# Patient Record
Sex: MALE | Race: WHITE | NOT HISPANIC OR LATINO | Employment: OTHER | URBAN - NONMETROPOLITAN AREA
[De-identification: names, ages, dates, MRNs, and addresses within clinical notes are randomized per-mention and may not be internally consistent; named-entity substitution may affect disease eponyms.]

---

## 2017-01-17 ENCOUNTER — AMBULATORY - GICH (OUTPATIENT)
Dept: SCHEDULING | Facility: OTHER | Age: 72
End: 2017-01-17

## 2017-01-26 ENCOUNTER — AMBULATORY - GICH (OUTPATIENT)
Dept: LAB | Facility: OTHER | Age: 72
End: 2017-01-26

## 2017-01-26 ENCOUNTER — OFFICE VISIT - GICH (OUTPATIENT)
Dept: UROLOGY | Facility: OTHER | Age: 72
End: 2017-01-26

## 2017-01-26 ENCOUNTER — HISTORY (OUTPATIENT)
Dept: UROLOGY | Facility: OTHER | Age: 72
End: 2017-01-26

## 2017-01-26 DIAGNOSIS — C61 MALIGNANT NEOPLASM OF PROSTATE (H): ICD-10-CM

## 2017-01-26 DIAGNOSIS — N52.9 MALE ERECTILE DYSFUNCTION: ICD-10-CM

## 2017-01-26 DIAGNOSIS — N40.1 ENLARGED PROSTATE WITH LOWER URINARY TRACT SYMPTOMS (LUTS): ICD-10-CM

## 2017-01-26 LAB — PSA TOTAL (DIAGNOSTIC) - HISTORICAL: 3.35 NG/ML

## 2017-02-16 ENCOUNTER — HISTORIC RESULTS (OUTPATIENT)
Dept: ADMINISTRATIVE | Age: 72
End: 2017-02-16

## 2017-04-03 ENCOUNTER — COMMUNICATION - GICH (OUTPATIENT)
Dept: FAMILY MEDICINE | Facility: OTHER | Age: 72
End: 2017-04-03

## 2017-04-03 DIAGNOSIS — I25.83 CORONARY ATHEROSCLEROSIS DUE TO LIPID RICH PLAQUE (CODE): ICD-10-CM

## 2017-04-03 DIAGNOSIS — I25.10 ATHEROSCLEROTIC HEART DISEASE OF NATIVE CORONARY ARTERY WITHOUT ANGINA PECTORIS: ICD-10-CM

## 2017-04-03 DIAGNOSIS — Z86.79 PERSONAL HISTORY OF OTHER DISEASES OF THE CIRCULATORY SYSTEM (CODE): ICD-10-CM

## 2017-05-01 ENCOUNTER — AMBULATORY - GICH (OUTPATIENT)
Dept: SCHEDULING | Facility: OTHER | Age: 72
End: 2017-05-01

## 2017-05-09 ENCOUNTER — HISTORY (OUTPATIENT)
Dept: FAMILY MEDICINE | Facility: OTHER | Age: 72
End: 2017-05-09

## 2017-05-09 ENCOUNTER — OFFICE VISIT - GICH (OUTPATIENT)
Dept: FAMILY MEDICINE | Facility: OTHER | Age: 72
End: 2017-05-09

## 2017-05-09 DIAGNOSIS — I25.10 ATHEROSCLEROTIC HEART DISEASE OF NATIVE CORONARY ARTERY WITHOUT ANGINA PECTORIS: ICD-10-CM

## 2017-05-09 DIAGNOSIS — I10 ESSENTIAL (PRIMARY) HYPERTENSION: ICD-10-CM

## 2017-05-09 DIAGNOSIS — Z12.5 ENCOUNTER FOR SCREENING FOR MALIGNANT NEOPLASM OF PROSTATE: ICD-10-CM

## 2017-05-09 DIAGNOSIS — Z12.11 ENCOUNTER FOR SCREENING FOR MALIGNANT NEOPLASM OF COLON: ICD-10-CM

## 2017-05-09 DIAGNOSIS — E78.5 HYPERLIPIDEMIA: ICD-10-CM

## 2017-06-14 ENCOUNTER — COMMUNICATION - GICH (OUTPATIENT)
Dept: UROLOGY | Facility: OTHER | Age: 72
End: 2017-06-14

## 2017-06-14 DIAGNOSIS — C61 MALIGNANT NEOPLASM OF PROSTATE (H): ICD-10-CM

## 2017-07-20 ENCOUNTER — HISTORY (OUTPATIENT)
Dept: UROLOGY | Facility: OTHER | Age: 72
End: 2017-07-20

## 2017-07-20 ENCOUNTER — OFFICE VISIT - GICH (OUTPATIENT)
Dept: UROLOGY | Facility: OTHER | Age: 72
End: 2017-07-20

## 2017-07-20 ENCOUNTER — AMBULATORY - GICH (OUTPATIENT)
Dept: LAB | Facility: OTHER | Age: 72
End: 2017-07-20

## 2017-07-20 DIAGNOSIS — C61 MALIGNANT NEOPLASM OF PROSTATE (H): ICD-10-CM

## 2017-07-20 LAB — PSA TOTAL (DIAGNOSTIC) - HISTORICAL: 3.23 NG/ML

## 2017-07-23 ENCOUNTER — AMBULATORY - GICH (OUTPATIENT)
Dept: SCHEDULING | Facility: OTHER | Age: 72
End: 2017-07-23

## 2017-08-05 ENCOUNTER — COMMUNICATION - GICH (OUTPATIENT)
Dept: FAMILY MEDICINE | Facility: OTHER | Age: 72
End: 2017-08-05

## 2017-08-05 DIAGNOSIS — I10 ESSENTIAL (PRIMARY) HYPERTENSION: ICD-10-CM

## 2017-09-29 ENCOUNTER — AMBULATORY - GICH (OUTPATIENT)
Dept: FAMILY MEDICINE | Facility: OTHER | Age: 72
End: 2017-09-29

## 2017-10-13 ENCOUNTER — AMBULATORY - GICH (OUTPATIENT)
Dept: FAMILY MEDICINE | Facility: OTHER | Age: 72
End: 2017-10-13

## 2017-10-13 DIAGNOSIS — Z23 ENCOUNTER FOR IMMUNIZATION: ICD-10-CM

## 2017-12-18 ENCOUNTER — AMBULATORY - GICH (OUTPATIENT)
Dept: UROLOGY | Facility: OTHER | Age: 72
End: 2017-12-18

## 2017-12-18 DIAGNOSIS — C61 MALIGNANT NEOPLASM OF PROSTATE (H): ICD-10-CM

## 2017-12-28 NOTE — TELEPHONE ENCOUNTER
Patient Information     Patient Name MRN Praveen Tuttle [id number] Male 1945      Telephone Encounter by Tomasa Avalos at 2017  7:59 AM     Author:  Tomasa Avalos Service:  (none) Author Type:  (none)     Filed:  2017  8:18 AM Encounter Date:  2017 Status:  Signed     :  Tomasa Avalos            Needs orders, Pended below.  Tomasa Avalos LPN........................2017  7:59 AM

## 2017-12-28 NOTE — TELEPHONE ENCOUNTER
Patient Information     Patient Name MRN Sex Praveen Painter [id number] Male 1945      Telephone Encounter by Macey Engle RN at 2017  3:26 PM     Author:  Macey Engle RN Service:  (none) Author Type:  NURS- Registered Nurse     Filed:  2017  3:27 PM Encounter Date:  2017 Status:  Signed     :  Macey Engle RN (NURS- Registered Nurse)            Angiotensin Receptor Blockers (ARB)    Office visit in the past 12 months or per provider note.    Last visit with TUCKER BATES was on: 2017 in [facility]  Next visit with TUCKER BATES is on: No future appointment listed with this provider  Next visit with Family Practice is on: No future appointment listed in this department    Lab test requirements:  Creatinine and Potassium annually, if ordering lab, order BMP.  CREATININE (mg/dL)    Date Value   2016 1.04     POTASSIUM (mmol/L)    Date Value   2016 4.2       Max refill for 12 months from last office visit or per provider note    Prescription refilled per RN Medication Refill Policy.................... Macey Engle RN ....................  2017   3:26 PM

## 2017-12-28 NOTE — PROGRESS NOTES
"Patient Information     Patient Name MRN Sex Praveen Painter [id number] Male 1945      Progress Notes by Romel Ray MD at 2017 10:45 AM     Author:  Romel Ray MD Service:  (none) Author Type:  Physician     Filed:  2017 11:22 AM Encounter Date:  2017 Status:  Signed     :  Romel Ray MD (Physician)            Type of Visit  EST    Chief Complaint  Prostate cancer  BPH    HPI  Mr. Lovell is a 72 y.o. male with clinical low risk prostate cancer (since 10/2013) on active surveillance.   He reports no changes in the last 6 months.  He denies new leg swelling, unintentional weight loss or new acute urinary symptoms.  His previous BPH symptoms have mostly resolved.  His ED is not something he treats any longer.  His genetic studies suggest more of an intermediate risk cancer.    History of prostate cancer-associated HPI  New leg swelling?  No  Shortness of breath?  No  Bone, rib or back pain? No      Review of Systems  I reviewed the ROS the patient today.    Nursing Notes:   Ashley Hansen RN  2017 10:43 AM  Signed  Review of Systems:    Weight loss:    No     Recent fever/chills:  No   Night sweats:   No  Current skin rash:  No   Recent hair loss:  No  Heat intolerance:  No   Cold intolerance:  No  Chest pain:   No   Palpitations:   No  Shortness of breath:  No   Wheezing:   No  Constipation:    No   Diarrhea:   No   Nausea:   No   Vomiting:   No   Kidney/side pain:  No   Back pain:   No  Frequent headaches:  No   Dizziness:     No  Leg swelling:   No   Calf pain:    No      Physical Exam  /76  Pulse 72  Resp 16  Ht 1.765 m (5' 9.5\")  Wt 99 kg (218 lb 3.2 oz)  BMI 31.76 kg/[id number]: NAD, WDWN.  Cardiovascular: Regular rate.  Pulmonary/Chest: Respirations are even and non-labored bilaterally.  Abdominal: Soft. No distension, tenderness, masses or guarding. No CVA tenderness.  Extremities: GLORIA x 4, Warm. No clubbing.  No cyanosis.    Skin: Pink, " warm and dry.  No rashes noted.  Genitourinary:  Nonpalpable bladder  Prostate:  Normal rectal tone today, 40 grams.  Symmetric, non-tender, and no induration.      Labs  Results for LIBORIO LOVELL (MRN [id number]) as of 7/20/2017 10:49   9/30/2015 10:51 12/16/2015 10:00 7/22/2016 09:10 1/26/2017 08:02 7/20/2017 08:51   PSA TOTAL (DIAGNOSTIC) 3.389 (H) 3.053 2.958 3.345 (H) 3.225 (H)       Pathology  10/9/2013  Grade 3+3=6 prostate cancer in 2/12 cores (15-20%)    12/16/2015  Grade 3+3=6 prostate cancer in 2/12 cores (80%)    Oncotype Dx    (from the 10/2013 specimen)  GPS:     24  Likelihood of favorable pathology: 61%    PSA:       3.389  Calculated prostate volume based on TRUS: 56 grams  PSA Density:      0.06 ng/mL/g  T stage:      cT2a    Assessment  Mr. Lovell is a 72 y.o. male with initial low risk prostate cancer (cT2a) on active surveillance and erectile dysfunction.  PSA stable and prostate exam today is stable.    Plan  Continue active surveillance for prostate cancer with PSA q6 months

## 2017-12-30 NOTE — NURSING NOTE
Patient Information     Patient Name MRN Sex Praveen Painter [id number] Male 1945      Nursing Note by Ashley Hansen RN at 2017 10:45 AM     Author:  Ashley Hansen RN Service:  (none) Author Type:  NURS- Registered Nurse     Filed:  2017 10:43 AM Encounter Date:  2017 Status:  Signed     :  Ashley Hansen RN (NURS- Registered Nurse)            Review of Systems:    Weight loss:    No     Recent fever/chills:  No   Night sweats:   No  Current skin rash:  No   Recent hair loss:  No  Heat intolerance:  No   Cold intolerance:  No  Chest pain:   No   Palpitations:   No  Shortness of breath:  No   Wheezing:   No  Constipation:    No   Diarrhea:   No   Nausea:   No   Vomiting:   No   Kidney/side pain:  No   Back pain:   No  Frequent headaches:  No   Dizziness:     No  Leg swelling:   No   Calf pain:    No

## 2018-01-03 NOTE — NURSING NOTE
Patient Information     Patient Name MRN Praveen Tuttle [id number] Male 1945      Nursing Note by Christina Meehan at 2017 11:00 AM     Author:  Christina Meehan Service:  (none) Author Type:  (none)     Filed:  2017 11:09 AM Encounter Date:  2017 Status:  Signed     :  Christina Meehan            Here for 6 month follow up on PSA.  Review of Systems:    Weight loss:    No     Recent fever/chills:  No   Night sweats:   No  Current skin rash:  No   Recent hair loss:  No  Heat intolerance:  No   Cold intolerance:  No  Chest pain:   No   Palpitations:   No  Shortness of breath:  No   Wheezing:   No  Constipation:    No   Diarrhea:   No   Nausea:   No   Vomiting:   No   Kidney/side pain:  No   Back pain:   No  Frequent headaches:  No   Dizziness:     No  Leg swelling:   No   Calf pain:    No    Christina Meehan LPN  2017  11:01 AM

## 2018-01-03 NOTE — PROGRESS NOTES
Patient Information     Patient Name MRN Sex Praveen Painter [id number] Male 1945      Progress Notes by Romel Ray MD at 2017 11:00 AM     Author:  Romel Ray MD Service:  (none) Author Type:  Physician     Filed:  2017  1:09 PM Encounter Date:  2017 Status:  Signed     :  Romel Ray MD (Physician)            Type of Visit  EST    Chief Complaint  Prostate cancer  BPH  ED    HPI  Mr. Lovell is a 71 y.o. male with clinical low risk prostate cancer (since 10/2013) on active surveillance.   He also has ED and BPH.  He reports no changes.  He denies new leg swelling, unintentional weight loss or new acute urinary symptoms.  He states he actually feels better today than he has a very long time.  His genetic studies suggest more of an intermediate risk cancer.    History of prostate cancer-associated HPI  New leg swelling?  No  Shortness of breath?  No  Bone, rib or back pain? No    BPH Symptoms  He also has BPH and was taking Flomax.  Flomax initially improved symptoms but then became unsure of its benefit.  He has since discontinued the medication and is satisfied with his current urinary symptoms.    ED Symptoms  He tried Cialis previously with some benefit.  The medication is becoming too expensive for him to continue.  He is not interested in a shorter acting medication that would be more affordable.      Review of Systems  I reviewed the ROS the patient today.    Nursing Notes:   Christina Meehan  2017 11:02 AM  Unsigned  Here for 6 month follow up on PSA.  Review of Systems:    Weight loss:    No     Recent fever/chills:  No   Night sweats:   No  Current skin rash:  No   Recent hair loss:  No  Heat intolerance:  No   Cold intolerance:  No  Chest pain:   No   Palpitations:   No  Shortness of breath:  No   Wheezing:   No  Constipation:    No   Diarrhea:   No   Nausea:   No   Vomiting:   No   Kidney/side pain:  No   Back pain:   No  Frequent  headaches:  No   Dizziness:     No  Leg swelling:   No   Calf pain:    No    Christina Shefali LPN  1/26/2017  11:01 AM              Physical Exam  Visit Vitals       /60     Resp 12     Wt 98.4 kg (217 lb)     BMI 31.14 kg/m2   Constitutional: NAD, WDWN.  Cardiovascular: Regular rate.  Pulmonary/Chest: Respirations are even and non-labored bilaterally.  Abdominal: Soft. No distension, tenderness, masses or guarding. No CVA tenderness.  Extremities: GLORIA x 4, Warm. No clubbing.  No cyanosis.    Skin: Pink, warm and dry.  No rashes noted.  Genitourinary:  Nonpalpable bladder  Prostate:  Normal rectal tone, 40 grams.  Symmetric, non-tender, and no induration.    There is a very small nodule in the right lobe just off midline.    Labs  Results for LIBORIO LOVELL (MRN [id number]) as of 1/26/2017 11:07   3/25/2015 11:00 9/30/2015 10:51 12/16/2015 10:00 7/22/2016 09:10 1/26/2017 08:02   PSA TOTAL (DIAGNOSTIC) 2.680 3.389 (H) 3.053 2.958 3.345 (H)       Pathology  10/9/2013  Grade 3+3=6 prostate cancer in 2/12 cores (15-20%)    12/16/2015  Grade 3+3=6 prostate cancer in 2/12 cores (80%)    Oncotype Dx    (from the 10/2013 specimen)  GPS:     24  Likelihood of favorable pathology: 61%    PSA:       3.389  Calculated prostate volume based on TRUS: 56 grams  PSA Density:      0.06 ng/mL/g  T stage:      cT2a    Assessment  Mr. Lovell is a 71 y.o. male with initial low risk prostate cancer (cT2a) on active surveillance and erectile dysfunction.  Repeat biopsy revealed stable pathology.  PSA stable    Plan  Discontinue Cialis due to cost  Continue active surveillance for prostate cancer   - PSA q6 months   - ADRIANNA every summer

## 2018-01-04 NOTE — PATIENT INSTRUCTIONS
Patient Information     Patient Name MRN Praveen Tuttle [id number] Male 1945      Patient Instructions by Magdaleno Palmer MD at 2017  9:30 AM     Author:  Magdaleno Palmer MD  Service:  (none) Author Type:  Physician     Filed:  2017 10:30 AM  Encounter Date:  2017 Status:  Addendum     :  Magdaleno Palmer MD (Physician)        Related Notes: Original Note by Magdaleno Palmer MD (Physician) filed at 2017 10:29 AM            Go ahead and cut the crestor to 10mg for a month or two and have Susy check it with a community screening.  Drop off the results for me and I can check.  We can print the cologuard stuff out for you and you can make sure it isnt going to cost you an arm or a leg.  If not, lets do it.  Tea tree oil can help the nails.

## 2018-01-04 NOTE — TELEPHONE ENCOUNTER
Patient Information     Patient Name MRN Praveen Tuttle [id number] Male 1945      Telephone Encounter by Donna Martin RN at 2017 10:26 AM     Author:  Donna Martin RN Service:  (none) Author Type:  (none)     Filed:  2017 10:27 AM Encounter Date:  4/3/2017 Status:  Signed     :  Donna Martin RN (NURS- Registered Nurse)            Nitrates/Vasodilators    Office visit in the past 12 months.    Last visit with TUCKER BATES was on: 2016 in [facility]  Next visit with TUCKER BATES is on: No future appointment listed with this provider  Next visit with Family Practice is on: No future appointment listed in this department    Max refills 12 months from last office visit.  If any concerns call patient and notify provider.    Refill refused - patient was given #25 4/3/2017 and must be seen for further refills.    Unable to complete prescription refill per RN Medication Refill Policy.................... Donna Martin ....................  2017   10:27 AM

## 2018-01-04 NOTE — TELEPHONE ENCOUNTER
Patient Information     Patient Name MRN Sex Praveen Painter [id number] Male 1945      Telephone Encounter by Salty Christy RN at 4/3/2017  3:14 PM     Author:  Salty Christy RN Service:  (none) Author Type:  NURS- Registered Nurse     Filed:  4/3/2017  3:32 PM Encounter Date:  4/3/2017 Status:  Signed     :  Salty Christy RN (NURS- Registered Nurse)            Nitrates/Vasodilators    Office visit in the past 12 months.    Last visit with TUCKER BATES was on: 2016 in [facility]  Next visit with TUCKER BATES is on: No future appointment listed with this provider  Next visit with Family Practice is on: No future appointment listed in this department    Max refills 12 months from last office visit.  If any concerns call patient and notify provider.    Chart review shows that last office visit with PCP to address use of nitro as well as diagnosis of history of ASCVD on 10/5/2015. Patient is due for annual physical/medication management appointment with PCP. Will refill rx for a limited supply and send patient a reminder letter.    Prescription refilled per RN Medication Refill Policy.................... Salty Christy RN ....................  4/3/2017   3:29 PM

## 2018-01-05 NOTE — PROGRESS NOTES
Patient Information     Patient Name MRN Sex Praveen Painter [id number] Male 1945      Progress Notes by Magdaleno Palmer MD at 2017  9:30 AM     Author:  Magdaleno Palmer MD Service:  (none) Author Type:  Physician     Filed:  5/15/2017  7:19 AM Encounter Date:  2017 Status:  Signed     :  Magdaleno Palmer MD (Physician)            SUBJECTIVE:    Praveen Lovell is a 71 y.o. male who presents for follow-up of his hypertension, allergies, hyperlipidemia and to review cancer screening and cardiac risk.    HPI: Patient reports that he feels quite well. Recently had follow-up with cardiologist. They strongly suggested he continue on statin. He tells me that he does not like them and thinks they are causing him significant side effects. Was on Lipitor. Reports feeling muscle aches but also feeling nauseous. Quit the medication. Was recently switched over to Crestor. Reports similar symptoms. He would like to have his cholesterol rechecked. He did have stents placed 3 years ago and I explained to him the cardiologist rationale and wanting to have him stay on statin therapy.    Blood pressure is been well controlled. He reports no side effects from the losartan or Norvasc.  Continues on aspirin 81 mg. Has not needed to use Nitrostat.    He reports no problems with bladder habits. No issues with stooling. He does not want to undergo colonoscopy. He understands risk of colon cancer and despite his overall health declines area and he would like to pursue cologuard. He understands limitations of this.    PROBLEM LIST:  Patient Active Problem List     Diagnosis  Code     HTN (hypertension) I10     ED (erectile dysfunction) N52.9     Prostate cancer 10/2013 on active surveillance with Dr Ray C61     CAD (coronary artery disease) I25.10     Dyslipidemia E78.5     Tick bite W57.XXXA     Benign nodular prostatic hyperplasia with lower urinary tract symptoms N40.1     PVC's (premature  ventricular contractions) I49.3     PAST MEDICAL HISTORY:  Past Medical History:     Diagnosis  Date     CAD (coronary artery disease) 12/2/2014    - 12/2/14 Cor angio: 90% prox LAD; s/p ZAYNAB x 1 to prox LAD with PTCA of diagonal 1 that was jailed due to plaque shifting, EF 65%        Erythema migrans (Lyme disease) 7/26/2015     HTN (hypertension)      Inguinal hernia 9/12/2012     Knee pain      Prostate cancer ([facility])      SURGICAL HISTORY:  Past Surgical History:      Procedure  Laterality Date     CVL CORONARY ANGIOGRAM POSS PCI  12/2/14    Cor angio: 90% prox LAD; s/p ZAYNAB x 1 to prox LAD with PTCA of diagonal 1 that was jailed due to plaque shifting, EF 65%        HERNIA REPAIR         SOCIAL HISTORY:  Social History     Social History        Marital status:       Spouse name: N/A     Number of children:  N/A     Years of education:  N/A     Occupational History      Not on file.     Social History Main Topics          Smoking status:   Former Smoker      Packs/day:  1.00      Years:  30.00      Types:  Cigarettes      Quit date:  1/1/1990      Smokeless tobacco:   Never Used       Comment: quit 7 years ago       Alcohol use   1.8 oz/week     3 Standard drinks or equivalent per week        Comment: weekly       Drug use:   No      Sexual activity:   Not on file      Other Topics   Concern      Service  Yes     Blood Transfusions  No     Caffeine Concern  Yes     4 cups of coffee daily      Occupational Exposure  No     Hobby Hazards  No     Sleep Concern  No     gets up to use bathroom      Stress Concern  No     Weight Concern  No     Special Diet  No     Exercise  Yes     daily - walks alot      Seat Belt  Yes     Social History Narrative     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    Quit smoking around 1995.      Drinks once or twice a week per year.    .  Three daughters.  6 grandkids.    One daughter in [place] -  to Dr. Rodriguez.    Two daughters and families in [place].                      FAMILY HISTORY:  Family History      Problem  Relation Age of Onset     Arthritis Mother      Stroke Mother 96     Cancer-colon Paternal Aunt      Cancer-prostate Paternal Uncle 70     Heart Disease Sister       CURRENT MEDICATIONS:   Current Outpatient Prescriptions       Medication  Sig Dispense Refill     amLODIPine (NORVASC) 2.5 mg tablet TAKE 1 TABLET BY MOUTH DAILY 90 tablet 3     aspirin enteric coated 81 mg tablet Take 1 tablet by mouth once daily with a meal.  0     fexofenadine (ALLEGRA) 180 mg tablet Take 1 tablet by mouth once daily if needed for Allergy Symptoms.       losartan (COZAAR) 100 mg tablet TAKE 1 TABLET BY MOUTH ONCE DAILY. 90 tablet 3     naproxen (ALEVE) 220 mg tablet Take 1 tablet by mouth 2 times daily with meals.  0     nitroglycerin (NITROSTAT) 0.4 mg sublingual tablet Place 1 tab under the tongue every 5 mins if needed for Chest Pain (up to 3 doses). DON'T USE VIAGRA/CIALIS/LEVITRA WITHIN 48 HRS OF NITRO 25 tablet 1     rosuvastatin (CRESTOR) 20 mg tablet Take 1 tablet by mouth at bedtime. 90 tablet 3     No current facility-administered medications for this visit.      Medications have been reviewed by me and are current to the best of my knowledge and ability.    ALLERGIES:  Ivp dye [diatrizoate meglumine (iv contrast dye)] and Plavix [clopidogrel bisulfate]    REVIEW OF SYSTEMS:  General: denies any general problems.  Eyes: denies problems  Ears/Nose/Throat: denies problems  Cardiovascular: denies problems  Respiratory: denies problems  Gastrointestinal: denies problems  Genitourinary: denies problems  Musculoskeletal: denies problems  Skin: denies problems  Neurologic: denies problems  Psychiatric: denies problems  Endocrine: denies problems  Heme/Lymphatic: denies problems  Allergic/Immunologic: denies problems  PHQ Depression Screening 5/9/2017   Date of PHQ exam (doc flow) 5/9/2017   1. Lack of interest/pleasure 0 - Not at all   2. Feeling down/depressed 0 - Not at all  "  PHQ-2 TOTAL SCORE 0      OBJECTIVE:  /80  Pulse 68  Ht 1.765 m (5' 9.5\")  Wt 98.4 kg (217 lb)  BMI 31.59 kg/m2  EXAM:   General Appearance: Pleasant, alert, appropriate appearance for age. No acute distress  Head Exam: Normal. Normocephalic, atraumatic.  Eye Exam:  Normal external eye, conjunctiva, lids, cornea. LINDA.  Fundoscopic Exam: Normal red reflex and fundoscopic exam.  Ear Exam: Normal TM's bilaterally. Normal auditory canals and external ears. Non-tender.  Nose Exam: Normal external nose, mucus membranes, and septum.  OroPharynx Exam:  Dental hygiene adequate. Normal buccal mucosa. Normal pharynx.  Neck Exam:  Supple, no masses or nodes.  Thyroid Exam: No nodules or enlargement.  Chest/Respiratory Exam: Normal chest wall and respirations. Clear to auscultation.  Breast Exam: Patient declined  Cardiovascular Exam: Regular rate and rhythm. S1, S2, no murmur, click, gallop, or rubs.  Gastrointestinal Exam: Soft, non-tender, no masses or organomegaly.  Rectal Exam: Patient declined  Genitourinary Exam Male: Patient declined  Lymphatic Exam: Non-palpable nodes in neck, clavicular, axillary, or inguinal regions.  Musculoskeletal Exam: Back is straight and non-tender, full ROM of upper and lower extremities.  Foot Exam: Left and right foot: good pedal pulses, no lesions, nail hygiene good.  Skin: no rash or abnormalities  Neurologic Exam: Nonfocal, symmetric DTRs, normal gross motor, tone coordination and no tremor.  Psychiatric Exam: Alert and oriented - appropriate affect.    ASSESSMENT/PLAN:    ICD-10-CM    1. Hyperlipidemia, unspecified hyperlipidemia type E78.5  I discussed with patient options. At this time he is amendable to cutting back on his Crestor and rechecking his lipids. I do think it would be important to try to have him remain on at least some statin but certainly understand his concerns.    2. ASCVD (arteriosclerotic cardiovascular disease) I25.10  patient is on aspirin. Blood " pressure reasonably well controlled. Continue to try to control risk factors.    3. Hypertension I10  borderline elevation. Patient wishes to try to better control blood pressure with improved exercise, reduction in salt and weight loss    4. Colon cancer screening Z12.11  forms for cologuard printed out    5. Prostate cancer screening Z12.5  patient follows with Dr. Ray      Mr. Lovell's Body mass index is 31.59 kg/(m^2). This is out of the normal range for a 71 y.o. Normal range for ages 18+ is between 18.5 and 24.9. To lose weight we reviewed risks and benefits of appropriate options such as diet, exercise, and medications. Patient's strategy will be  self-directed nutrition plan and self-directed exercise program  BP Readings from Last 1 Encounters:05/09/17 : 138/80  Mr. Lovell's blood pressure is out of the normal range for adults. Per JNC-8 guidelines normal adult blood pressure is < 120/80, pre-hypertensive is between 120/80 and 139/89, and hypertension is 140/90 or greater. Risks of hypertension were discussed. Patient's strategy will be weight loss and reduced salt intake    Magdaleno Palmer MD

## 2018-01-12 ENCOUNTER — AMBULATORY - GICH (OUTPATIENT)
Dept: LAB | Facility: OTHER | Age: 73
End: 2018-01-12

## 2018-01-12 DIAGNOSIS — C61 MALIGNANT NEOPLASM OF PROSTATE (H): ICD-10-CM

## 2018-01-12 LAB — PSA TOTAL (DIAGNOSTIC) - HISTORICAL: 3.58 NG/ML

## 2018-01-15 ENCOUNTER — HISTORY (OUTPATIENT)
Dept: UROLOGY | Facility: OTHER | Age: 73
End: 2018-01-15

## 2018-01-15 ENCOUNTER — OFFICE VISIT - GICH (OUTPATIENT)
Dept: UROLOGY | Facility: OTHER | Age: 73
End: 2018-01-15

## 2018-01-15 DIAGNOSIS — C61 MALIGNANT NEOPLASM OF PROSTATE (H): ICD-10-CM

## 2018-01-16 PROBLEM — I49.3 PVC'S (PREMATURE VENTRICULAR CONTRACTIONS): Status: ACTIVE | Noted: 2017-02-15

## 2018-01-16 RX ORDER — CODEINE PHOSPHATE/GUAIFENESIN 10-100MG/5
5-10 LIQUID (ML) ORAL
COMMUNITY
Start: 2017-11-01 | End: 2018-07-25

## 2018-01-16 RX ORDER — NITROGLYCERIN 0.4 MG/1
TABLET SUBLINGUAL
COMMUNITY
Start: 2017-04-05 | End: 2018-07-25

## 2018-01-16 RX ORDER — ASPIRIN 81 MG/1
81 TABLET ORAL
COMMUNITY
Start: 2011-06-23 | End: 2020-06-04

## 2018-01-16 RX ORDER — NAPROXEN SODIUM 220 MG
220 TABLET ORAL 2 TIMES DAILY WITH MEALS
COMMUNITY
Start: 2016-12-28 | End: 2018-07-25

## 2018-01-16 RX ORDER — LOSARTAN POTASSIUM 100 MG/1
100 TABLET ORAL
COMMUNITY
Start: 2017-08-07 | End: 2018-07-25

## 2018-01-16 RX ORDER — ROSUVASTATIN CALCIUM 20 MG/1
20 TABLET, COATED ORAL AT BEDTIME
COMMUNITY
Start: 2017-02-15 | End: 2018-03-07

## 2018-01-16 RX ORDER — FEXOFENADINE HCL 180 MG/1
180 TABLET ORAL
COMMUNITY
Start: 2015-09-30 | End: 2018-07-25

## 2018-01-16 RX ORDER — AMLODIPINE BESYLATE 2.5 MG/1
2.5 TABLET ORAL
COMMUNITY
Start: 2017-05-08 | End: 2018-06-20

## 2018-01-25 VITALS
SYSTOLIC BLOOD PRESSURE: 138 MMHG | BODY MASS INDEX: 31.07 KG/M2 | HEART RATE: 68 BPM | WEIGHT: 217 LBS | DIASTOLIC BLOOD PRESSURE: 80 MMHG | HEIGHT: 70 IN

## 2018-01-25 VITALS
BODY MASS INDEX: 30.65 KG/M2 | SYSTOLIC BLOOD PRESSURE: 110 MMHG | RESPIRATION RATE: 12 BRPM | DIASTOLIC BLOOD PRESSURE: 60 MMHG | WEIGHT: 217 LBS

## 2018-01-25 VITALS
HEART RATE: 72 BPM | HEIGHT: 70 IN | WEIGHT: 218.2 LBS | SYSTOLIC BLOOD PRESSURE: 122 MMHG | RESPIRATION RATE: 16 BRPM | BODY MASS INDEX: 31.24 KG/M2 | DIASTOLIC BLOOD PRESSURE: 76 MMHG

## 2018-02-01 ENCOUNTER — HISTORY (OUTPATIENT)
Dept: CARDIOLOGY | Facility: OTHER | Age: 73
End: 2018-02-01

## 2018-02-01 ENCOUNTER — OFFICE VISIT - GICH (OUTPATIENT)
Dept: CARDIOLOGY | Facility: OTHER | Age: 73
End: 2018-02-01

## 2018-02-01 ENCOUNTER — MEDICAL CORRESPONDENCE (OUTPATIENT)
Dept: CARDIOLOGY | Facility: CLINIC | Age: 73
End: 2018-02-01
Payer: COMMERCIAL

## 2018-02-01 DIAGNOSIS — Z95.5 PRESENCE OF CORONARY ANGIOPLASTY IMPLANT AND GRAFT: ICD-10-CM

## 2018-02-01 DIAGNOSIS — I25.10 ATHEROSCLEROTIC HEART DISEASE OF NATIVE CORONARY ARTERY WITHOUT ANGINA PECTORIS: ICD-10-CM

## 2018-02-01 DIAGNOSIS — E78.00 PURE HYPERCHOLESTEROLEMIA: ICD-10-CM

## 2018-02-01 DIAGNOSIS — Q23.1 CONGENITAL INSUFFICIENCY OF AORTIC VALVE: ICD-10-CM

## 2018-02-01 DIAGNOSIS — I10 ESSENTIAL (PRIMARY) HYPERTENSION: ICD-10-CM

## 2018-02-01 DIAGNOSIS — I35.0 NONRHEUMATIC AORTIC VALVE STENOSIS: ICD-10-CM

## 2018-02-01 DIAGNOSIS — E66.9 OBESITY: ICD-10-CM

## 2018-02-01 DIAGNOSIS — I25.83 CORONARY ATHEROSCLEROSIS DUE TO LIPID RICH PLAQUE (CODE): ICD-10-CM

## 2018-02-01 DIAGNOSIS — I71.20 THORACIC AORTIC ANEURYSM WITHOUT RUPTURE (H): ICD-10-CM

## 2018-02-01 PROCEDURE — 99204 OFFICE O/P NEW MOD 45 MIN: CPT | Mod: ZP | Performed by: INTERNAL MEDICINE

## 2018-02-09 VITALS
WEIGHT: 215 LBS | DIASTOLIC BLOOD PRESSURE: 82 MMHG | HEART RATE: 72 BPM | SYSTOLIC BLOOD PRESSURE: 120 MMHG | BODY MASS INDEX: 31.29 KG/M2

## 2018-02-09 VITALS
RESPIRATION RATE: 16 BRPM | BODY MASS INDEX: 31.44 KG/M2 | WEIGHT: 216 LBS | SYSTOLIC BLOOD PRESSURE: 130 MMHG | DIASTOLIC BLOOD PRESSURE: 76 MMHG

## 2018-02-13 ENCOUNTER — TELEPHONE (OUTPATIENT)
Dept: LAB | Facility: OTHER | Age: 73
End: 2018-02-13

## 2018-02-13 DIAGNOSIS — I25.83 CORONARY ARTERY DISEASE DUE TO LIPID RICH PLAQUE: Primary | ICD-10-CM

## 2018-02-13 DIAGNOSIS — E78.00 PURE HYPERCHOLESTEROLEMIA: ICD-10-CM

## 2018-02-13 DIAGNOSIS — I25.10 CORONARY ARTERY DISEASE DUE TO LIPID RICH PLAQUE: Primary | ICD-10-CM

## 2018-02-13 NOTE — NURSING NOTE
Patient Information     Patient Name MRN Praveen Tuttle [id number] Male 1945      Nursing Note by Jennifer Austin at 2018  9:45 AM     Author:  Jennifer Austin Service:  (none) Author Type:  (none)     Filed:  2018  9:58 AM Encounter Date:  2018 Status:  Signed     :  Jennifer Austin            Patient comes in for consult to establish care.  Jennifer Austin LPN ....................  2018   9:54 AM

## 2018-02-13 NOTE — PATIENT INSTRUCTIONS
Patient Information     Patient Name MRN Sex Praveen Painter [id number] Male 1945      Patient Instructions by Macey Engle RN at 2018  9:45 AM     Author:  Macey Engle RN Service:  (none) Author Type:  NURS- Registered Nurse     Filed:  2018 11:09 AM Encounter Date:  2018 Status:  Signed     :  Macey Engle RN (NURS- Registered Nurse)            Fasting labs at your convenience    Refill nitro     Echo - depending results we may need stop Norvasc and start metoprolol    Please follow-up with cardiology in 1 month

## 2018-02-13 NOTE — PROGRESS NOTES
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 HEART CARE   CARDIOLOGY CONSULT    Praveen Lovell    Magdaleno Palmer MD    Chief Complaint     Patient presents with       Consult      establish care         HPI:  Mr. Lovell is a 72-year-old gentleman who is being seen in consultation for a one-year follow-up as he had previously been a patient of Dr. Antoine's. He was followed secondary to coronary artery disease with stent placement to his LAD 14. In addition, he has a history of obesity, hypertension, mild aortic stenosis, bicuspid aortic valve, and concerns for an ascending aortic aneurysm.    He had last seen Dr. Antoine on 2/15/17. At that time, it was recommended he have a yearly follow-up secondary to coronary artery disease and stent placement. He had a stress test on 14 that showed concerns for wall motion abnormalities. He went on to have an angiogram which showed a 90% stenosis to his LAD. He is status post resolute drug-eluting stent 3.5 mm x 15 mm to his LAD. Following the procedure a he developed a rash. It was uncertain if his rash was secondary to contrast or secondary to Plavix. He was switched to prasugrel as a result. With these changes, he did not have a recurrence of his rash. He reports cross-country skiing on a regular basis up to 45 minutes and at 3 miles on a regular basis. He does not have symptoms with this. Also, he does walk on a treadmill and he is asymptomatic.    Since being last seen, he denies chest pain, chest tightness, or chest discomfort. He has not been dyspneic. He has not had shoulder, neck, jaw, or intrascapular pain.  He has denied any swelling to his lower extremities. He is asymptomatic from a cardiac standpoint.    Previously, he had a stress echo on 11/25/14. He was noted have wall motion abnormalities. In addition, he was noted to have mild mitral and tricuspid regurgitation. He had concerns for bicuspid aortic valve, mild aortic stenosis, and concerns for an ascending aortic aneurysm. He has not had repeat imaging of his heart since. As result, it is suggested he have an echo and he was agreeable. He has denied dizziness, lightheadedness, near syncope, syncope, swelling to his lower extremities, chest pain, chest tightness, or chest discomfort. He has minimal to no murmur on exam today. He was suggested that if he is found of a bicuspid aortic valve that he have his 3 daughters checked for this and his brothers and sisters. He is to have his first-degree relatives evaluated. He is aware.    He is also being treated for hypertension. His blood pressure today is relatively controlled at 120/82. He is currently on Norvasc 2.5 mg daily and losartan 100 mg daily. It was suggested in the future if he is found to have an ascending aortic aneurysm, that he consider switching from Norvasc to metoprolol.    Also, he has a history of hyperlipidemia. He is currently on Crestor 20 mg daily. His last cholesterol was on 3/31/17 which showed total cholesterol of 102, LDL of 33, triglycerides of 84, and a HDL of 49.    IMAGING RESULTS:  Stress echo completed on 11-25/14.  Final Impression:  1. Abnormal echocardiographic portion of this stress echocardiogram with a new wall motion abnormality involving the apical segments of the ventricular septum inferior and inferolateral walls.  Overall the ejection fraction does appear to increase from 60% pre-exercise to approximately 65% immediately post exercise.  2. Screening color Doppler examination reveals mild mitral regurgitation and mild tricuspid regurgitation.   3. Possible bicuspid aortic  valve with mild aortic stenosis.  The peak velocity across the aortic valve is 2.3 m/s and the maximum gradient is 20 mm Hg.  The mean gradient is 11 mm Hg.  4. Mild aortic root enlargement.  5. For details of the stress electrocardiogram portion of the examination, please see the monitoring physician's report.            US VENOUS LOWER EXTREMITY LEFT  2/5/2016 3:45 PM     History:Male, age 70 years  Calf swelling             Comparison: None.     Technique: Grayscale and color Doppler ultrasound of the left lower extremity deep venous structures.     Findings:   The deep venous structures are patent and fully compressible. There is normal augmentation of flow.         Impression:  1.  Normal examination. No evidence of DVT.         Electronically Signed By: Mendel Randle M.D. on 2/5/2016 3:59 PM      PAST MEDICAL HISTORY:  Past Medical History:     Diagnosis  Date     CAD (coronary artery disease) 12/2/2014    - 12/2/14 Cor angio: 90% prox LAD; s/p ZAYNAB x 1 to prox LAD with PTCA of diagonal 1 that was jailed due to plaque shifting, EF 65%        Erythema migrans (Lyme disease) 7/26/2015     HTN (hypertension)      Inguinal hernia 9/12/2012     Knee pain      Prostate cancer (HC)        FAMILY HISTORY:  Family History      Problem  Relation Age of Onset     Arthritis Mother      Stroke Mother 96     Cancer-colon Paternal Aunt      Cancer-prostate Paternal Uncle 70     Heart Disease Sister        PAST SURGICAL HISTORY:  Past Surgical History:      Procedure  Laterality Date     CVL CORONARY ANGIOGRAM POSS PCI  12/2/14    Cor angio: 90% prox LAD; s/p ZAYNAB x 1 to prox LAD with PTCA of diagonal 1 that was jailed due to plaque shifting, EF 65%        HERNIA REPAIR         SOCIAL HISTORY:  Social History     Social History        Marital status:       Spouse name: N/A     Number of children:  N/A     Years of education:  N/A     Social History Main Topics         Smoking status:   Former Smoker     Packs/day:   1.00     Years:  30.00     Types:  Cigarettes     Quit date:  1/1/1990     Smokeless tobacco:   Never Used      Comment: quit 7 years ago      Alcohol use   None      Comment: weekly      Drug use:   None     Sexual activity:   Not Asked     Other Topics   Concern      Service  Yes     Blood Transfusions  No     Caffeine Concern  Yes     4 cups of coffee daily      Occupational Exposure  No     Hobby Hazards  No     Sleep Concern  No     gets up to use bathroom      Stress Concern  No     Weight Concern  No     Special Diet  No     Exercise  Yes     daily - walks alot      Seat Belt  Yes     Social History Narrative     [employer].  [employer].    Quit smoking around 1995.      Drinks once or twice a week per year.    .  Three daughters.  6 grandkids.    One daughter in [place] -  to Dr. Rodirguez.    Two daughters and families in [place].                       CURRENT MEDICATIONS:  Current Outpatient Prescriptions on File Prior to Visit       Medication  Sig Dispense Refill     amLODIPine (NORVASC) 2.5 mg tablet TAKE 1 TABLET BY MOUTH DAILY 90 tablet 3     aspirin enteric coated 81 mg tablet Take 1 tablet by mouth once daily with a meal.  0     codeine-guaiFENesin (ROBITUSSIN AC)  mg/5 mL liquid Take 5-10 mL by mouth every 6 hours if needed for Cough. Max dose 60 mL per 24 hrs. 100 mL 0     fexofenadine (ALLEGRA) 180 mg tablet Take 1 tablet by mouth once daily if needed for Allergy Symptoms.       naproxen (ALEVE) 220 mg tablet Take 1 tablet by mouth 2 times daily with meals.  0     rosuvastatin (CRESTOR) 20 mg tablet Take 1 tablet by mouth at bedtime. 90 tablet 3     No current facility-administered medications on file prior to visit.        ALLERGIES:  Allergies     Allergen  Reactions     Ivp Dye [Diatrizoate Meglumine (Iv Contrast Dye)] Rash     Plavix [Clopidogrel Bisulfate] Rash         ROS:  CONSTITUTIONAL:  No weight loss, fever, chills, weakness or fatigue.  HEENT:  Eyes:   No visual changes. Ears, Nose, Throat:  No hearing loss, congestion or difficulty swallowing.   CARDIOVASCULAR:  No chest pain, chest pressure or chest discomfort. No palpitations or lower extremity edema.  RESPIRATORY:  No shortness of breath, dyspnea upon exertion, cough or sputum production.  GASTROINTESTINAL: No abdominal pain. No anorexia, nausea, vomiting or diarrhea.   NEUROLOGICAL:  No headache, lightheadedness, dizziness, syncope, ataxia or weakness.  HEMATOLOGIC:  No anemia, bleeding or bruising.  PSYCHIATRIC:  No history of depression or anxiety.  ENDOCRINOLOGIC:  No reports of sweating, cold or heat intolerance. No polyuria or polydipsia.  SKIN:  No abnormal rashes or itching.      PHYSICAL EXAM:  /82 (Cuff Site: Right Arm, Position: Sitting, Cuff Size: Adult Large)  Pulse 72  Wt 97.5 kg (215 lb)  BMI 29.99 kg/m2  GENERAL: The patient is a well-developed, well-nourished, in no apparent distress. Alert and oriented x3.  HEENT: Head is normocephalic and atraumatic. Eyes are symmetrical with normal visual tracking.   HEART: Regular rate and rhythm, S1S2 present with a slight murmur, but no rub or gallop.  LUNGS: Respirations regular and unlabored. Clear to auscultation.  GI: Abdomen is soft and nondistended.    EXTREMITIES: No peripheral edema present.   MUSCULOSKELETAL: No joint swelling.  NEUROLOGIC: Alert and oriented X3.SKIN: No jaundice. No rashes or visible skin lesions present.        LAB RESULTS:  Orders Only on 01/12/2018        Component  Date Value Ref Range Status     PSA TOTAL (DIAGNOSTIC) 01/12/2018 3.585* <=3.100 ng/mL Final         ASSESSMENT:  Mr. Lovell is a 72-year-old gentleman who is being seen in consultation for a one-year follow-up as he had previously been a patient of Dr. Garcia. He was followed secondary to coronary artery disease with stent placement to his LAD 12/2/14. In addition, he has a history of obesity, hypertension, mild aortic stenosis, bicuspid aortic  valve, and concerns for an ascending aortic aneurysm.      PLAN:  1. Stented coronary artery to LAD on 12/2/2014.  He is asymptomatic in regards to symptoms presently. He will repeat cholesterol. He'll be given a prescription for sublingual nitroglycerin as needed for chest pain. He'll continue aspirin 81 mg daily, losartan 100 mg daily, and Crestor 20 mg daily. The future, he should be considered for beta blocker and potentially transfuse her off the Norvasc. He'll be seen in approximately one month follow-up.      - nitroglycerin (NITROSTAT) 0.4 mg sublingual tablet; Place 1 tab under the tongue every 5 mins if needed for Chest Pain (up to 3 doses). DON'T USE VIAGRA/CIALIS/LEVITRA WITHIN 48 HRS OF NITRO  Dispense: 25 tablet; Refill: 2    2. Coronary artery disease due to lipid rich plaque.  As noted, he had a stent placed to his LAD on 12/2/14. His LAD was 90% stenosed. He had a 3.5 x 15 mm resolute drug-eluting stent placed.      - nitroglycerin (NITROSTAT) 0.4 mg sublingual tablet; Place 1 tab under the tongue every 5 mins if needed for Chest Pain (up to 3 doses). DON'T USE VIAGRA/CIALIS/LEVITRA WITHIN 48 HRS OF NITRO  Dispense: 25 tablet; Refill: 2  - LIPID PANEL; Future    3. HTN (hypertension).  Stable as his blood pressure today is 120/82 with a heart rate of 72.     tablet; Patient says he takes 1/2 tablet daily  Dispense: 90 tablet; Refill: 2    4. Aortic stenosis, mild.   This was described as being mild on a stress echo from 11/20/14. He will have a repeat echo to assess this finding.    5. Bicuspid aortic valve.  He will have a repeat echo to assess this finding.    6. Ascending aortic aneurysm.  He will have a repeat echo to assess this finding.    7. Class 1 obesity without serious comorbidity with body mass index (BMI) of 30.0 to 30.9 in adult, unspecified obesity type.  As noted, this is mild on his most recent BMI of 29.99.    8.  Hypercholesterolemia.  He will have a repeat cholesterol panel and  continue Crestor 20 mg daily.    - LIPID PANEL; Future          Thank you for allowing me to participate in the care of your patient. Please do not hesitate to contact me if you have any questions.     David Fernandes, DO

## 2018-02-21 ENCOUNTER — DOCUMENTATION ONLY (OUTPATIENT)
Dept: FAMILY MEDICINE | Facility: OTHER | Age: 73
End: 2018-02-21

## 2018-02-21 NOTE — TELEPHONE ENCOUNTER
In [facility], there is an order for Lipids with expected date of 2/1/18 placed by Dr Fernandes.  Would you like any other lab?  Kathleen Blue CMA([facility])..................2/21/2018   1:12 PM

## 2018-02-23 ENCOUNTER — HOSPITAL ENCOUNTER (OUTPATIENT)
Dept: CARDIOLOGY | Facility: OTHER | Age: 73
Discharge: HOME | End: 2018-02-23
Attending: INTERNAL MEDICINE | Admitting: INTERNAL MEDICINE
Payer: MEDICARE

## 2018-02-23 ENCOUNTER — TELEPHONE (OUTPATIENT)
Dept: CARDIOLOGY | Facility: OTHER | Age: 73
End: 2018-02-23

## 2018-02-23 DIAGNOSIS — I25.10 CORONARY ARTERY DISEASE DUE TO LIPID RICH PLAQUE: ICD-10-CM

## 2018-02-23 DIAGNOSIS — Q23.81 BICUSPID AORTIC VALVE: ICD-10-CM

## 2018-02-23 DIAGNOSIS — I35.0 AORTIC STENOSIS: ICD-10-CM

## 2018-02-23 DIAGNOSIS — E78.00 PURE HYPERCHOLESTEROLEMIA: ICD-10-CM

## 2018-02-23 DIAGNOSIS — I25.83 CORONARY ARTERY DISEASE DUE TO LIPID RICH PLAQUE: ICD-10-CM

## 2018-02-23 DIAGNOSIS — I71.21 ASCENDING AORTIC ANEURYSM (H): ICD-10-CM

## 2018-02-23 LAB
ANION GAP SERPL CALCULATED.3IONS-SCNC: 7 MMOL/L (ref 3–14)
BUN SERPL-MCNC: 26 MG/DL (ref 7–25)
CALCIUM SERPL-MCNC: 9 MG/DL (ref 8.6–10.3)
CHLORIDE SERPL-SCNC: 106 MMOL/L (ref 98–107)
CHOLEST SERPL-MCNC: 151 MG/DL
CO2 SERPL-SCNC: 27 MMOL/L (ref 21–31)
CREAT SERPL-MCNC: 1.09 MG/DL (ref 0.7–1.3)
GFR SERPL CREATININE-BSD FRML MDRD: 66 ML/MIN/1.7M2
GLUCOSE SERPL-MCNC: 100 MG/DL (ref 70–105)
HDLC SERPL-MCNC: 46 MG/DL (ref 23–92)
LDLC SERPL CALC-MCNC: 88 MG/DL
NONHDLC SERPL-MCNC: 105 MG/DL
POTASSIUM SERPL-SCNC: 4.2 MMOL/L (ref 3.5–5.1)
SODIUM SERPL-SCNC: 140 MMOL/L (ref 134–144)
TRIGL SERPL-MCNC: 85 MG/DL

## 2018-02-23 PROCEDURE — 36415 COLL VENOUS BLD VENIPUNCTURE: CPT | Performed by: INTERNAL MEDICINE

## 2018-02-23 PROCEDURE — 80048 BASIC METABOLIC PNL TOTAL CA: CPT | Performed by: INTERNAL MEDICINE

## 2018-02-23 PROCEDURE — 93306 TTE W/DOPPLER COMPLETE: CPT | Mod: 26 | Performed by: INTERNAL MEDICINE

## 2018-02-23 PROCEDURE — 93306 TTE W/DOPPLER COMPLETE: CPT

## 2018-02-23 PROCEDURE — 80061 LIPID PANEL: CPT | Performed by: INTERNAL MEDICINE

## 2018-02-23 NOTE — TELEPHONE ENCOUNTER
Pt given lab results.  See lipid panel result note    Macey Engle RN............................ 2/23/2018 3:52 PM

## 2018-03-07 DIAGNOSIS — E78.2 MIXED HYPERLIPIDEMIA: ICD-10-CM

## 2018-03-07 DIAGNOSIS — I25.83 CORONARY ARTERY DISEASE DUE TO LIPID RICH PLAQUE: Primary | ICD-10-CM

## 2018-03-07 DIAGNOSIS — I25.10 CORONARY ARTERY DISEASE DUE TO LIPID RICH PLAQUE: Primary | ICD-10-CM

## 2018-03-07 RX ORDER — ROSUVASTATIN CALCIUM 20 MG/1
20 TABLET, COATED ORAL AT BEDTIME
Qty: 90 TABLET | Refills: 3 | Status: SHIPPED | OUTPATIENT
Start: 2018-03-07 | End: 2020-02-24

## 2018-03-07 NOTE — TELEPHONE ENCOUNTER
Refill request for crestor.  Dr Fernandes OV note from 2/1/18 states to continue 20 mg daily.  #90 with 3 refills sent    Macey Engle RN............................ 3/7/2018 1:32 PM

## 2018-06-20 DIAGNOSIS — I10 HYPERTENSION, UNSPECIFIED TYPE: ICD-10-CM

## 2018-06-20 DIAGNOSIS — I10 BENIGN ESSENTIAL HYPERTENSION: Primary | ICD-10-CM

## 2018-06-21 PROBLEM — E66.9 OBESITY: Status: ACTIVE | Noted: 2018-02-01

## 2018-06-21 PROBLEM — I35.0 AORTIC STENOSIS, MILD: Status: ACTIVE | Noted: 2018-02-01

## 2018-06-21 PROBLEM — I71.21 ASCENDING AORTIC ANEURYSM (H): Status: ACTIVE | Noted: 2018-02-01

## 2018-06-21 PROBLEM — Q23.81 BICUSPID AORTIC VALVE: Status: ACTIVE | Noted: 2018-02-01

## 2018-06-21 PROBLEM — I10 BENIGN ESSENTIAL HYPERTENSION: Status: ACTIVE | Noted: 2018-06-21

## 2018-06-21 PROBLEM — Z95.5 STENTED CORONARY ARTERY: Status: ACTIVE | Noted: 2018-02-01

## 2018-06-21 PROBLEM — E78.00 PURE HYPERCHOLESTEROLEMIA: Status: ACTIVE | Noted: 2018-02-01

## 2018-06-21 RX ORDER — NITROGLYCERIN 0.4 MG/1
TABLET SUBLINGUAL
COMMUNITY
Start: 2018-02-01 | End: 2019-12-05

## 2018-06-21 RX ORDER — LOSARTAN POTASSIUM 100 MG/1
TABLET ORAL
COMMUNITY
Start: 2018-02-01 | End: 2018-07-25

## 2018-06-21 RX ORDER — CODEINE PHOSPHATE AND GUAIFENESIN 10; 100 MG/5ML; MG/5ML
5-10 SOLUTION ORAL
COMMUNITY
Start: 2017-11-01 | End: 2018-07-25

## 2018-06-25 DIAGNOSIS — C61 PROSTATE CANCER (H): Primary | ICD-10-CM

## 2018-06-25 RX ORDER — AMLODIPINE BESYLATE 2.5 MG/1
2.5 TABLET ORAL DAILY
Qty: 30 TABLET | Refills: 0 | Status: SHIPPED | OUTPATIENT
Start: 2018-06-25 | End: 2018-07-25

## 2018-07-20 RX ORDER — RIVAROXABAN 20 MG/1
1 TABLET, FILM COATED ORAL
Refills: 11 | COMMUNITY
Start: 2017-12-29 | End: 2018-07-25

## 2018-07-23 NOTE — PROGRESS NOTES
Patient Information     Patient Name  Praveen Lovell MRN  [id number] Sex  Male   1945      Letter by Magdaleno Palmer MD at      Author:  Magdaleno Palmer MD Service:  (none) Author Type:  (none)    Filed:   Encounter Date:  4/3/2017 Status:  (Other)           Praveen Lovell  [address] [facility] [place] [facility] [address]          April 3, 2017    Dear Mr. Lovell:    This is to remind you that you are due for your 1 year physical appointment with Magdaleno Palmer MD in relation to continued use of nitro.  Your last visit was on 10/5/2015. Additional refills of your medication require you to complete this visit.    Please call [phone] to schedule your appointment.    Thank you for choosing [facility] for your health care needs.    Sincerely,      Refill RN  [facility]

## 2018-07-25 ENCOUNTER — OFFICE VISIT (OUTPATIENT)
Dept: FAMILY MEDICINE | Facility: OTHER | Age: 73
End: 2018-07-25
Attending: FAMILY MEDICINE
Payer: MEDICARE

## 2018-07-25 VITALS
SYSTOLIC BLOOD PRESSURE: 132 MMHG | BODY MASS INDEX: 30.81 KG/M2 | HEIGHT: 69 IN | WEIGHT: 208 LBS | DIASTOLIC BLOOD PRESSURE: 84 MMHG | HEART RATE: 68 BPM

## 2018-07-25 DIAGNOSIS — C61 PROSTATE CANCER (H): ICD-10-CM

## 2018-07-25 DIAGNOSIS — Z71.89 ADVANCED DIRECTIVES, COUNSELING/DISCUSSION: ICD-10-CM

## 2018-07-25 DIAGNOSIS — Z11.59 ENCOUNTER FOR HEPATITIS C SCREENING TEST FOR LOW RISK PATIENT: Primary | ICD-10-CM

## 2018-07-25 DIAGNOSIS — I25.10 CORONARY ARTERY DISEASE INVOLVING NATIVE CORONARY ARTERY OF NATIVE HEART WITHOUT ANGINA PECTORIS: ICD-10-CM

## 2018-07-25 DIAGNOSIS — I10 BENIGN ESSENTIAL HYPERTENSION: ICD-10-CM

## 2018-07-25 LAB — PSA SERPL-MCNC: 4.91 NG/ML

## 2018-07-25 PROCEDURE — 36415 COLL VENOUS BLD VENIPUNCTURE: CPT | Performed by: UROLOGY

## 2018-07-25 PROCEDURE — 84153 ASSAY OF PSA TOTAL: CPT | Performed by: UROLOGY

## 2018-07-25 PROCEDURE — 99214 OFFICE O/P EST MOD 30 MIN: CPT | Performed by: FAMILY MEDICINE

## 2018-07-25 PROCEDURE — 86803 HEPATITIS C AB TEST: CPT | Performed by: FAMILY MEDICINE

## 2018-07-25 PROCEDURE — G0463 HOSPITAL OUTPT CLINIC VISIT: HCPCS

## 2018-07-25 RX ORDER — METOPROLOL SUCCINATE 50 MG/1
50 TABLET, EXTENDED RELEASE ORAL DAILY
Qty: 90 TABLET | Refills: 3 | Status: SHIPPED | OUTPATIENT
Start: 2018-07-25 | End: 2019-08-09

## 2018-07-25 RX ORDER — LOSARTAN POTASSIUM 100 MG/1
100 TABLET ORAL DAILY
Qty: 90 TABLET | Refills: 3 | Status: SHIPPED | OUTPATIENT
Start: 2018-07-25 | End: 2019-08-09

## 2018-07-25 RX ORDER — AMLODIPINE BESYLATE 2.5 MG/1
2.5 TABLET ORAL DAILY
Qty: 90 TABLET | Refills: 3 | Status: CANCELLED | OUTPATIENT
Start: 2018-07-25

## 2018-07-25 NOTE — PATIENT INSTRUCTIONS
Stay on the crestor, xarelto and losartan.  Stop the norvasc and instead start the metoprolol 50mg XL.

## 2018-07-25 NOTE — MR AVS SNAPSHOT
After Visit Summary   7/25/2018    Praveen Lovell    MRN: [id number]           Patient Information     Date Of Birth          1945        Visit Information        Provider Department      7/25/2018 10:30 AM Magdaleno Palmer MD [facility]        Today's Diagnoses     Benign essential hypertension        Prostate cancer (H)          Care Instructions    Stay on the crestor, xarelto and losartan.  Stop the norvasc and instead start the metoprolol 50mg XL.          Follow-ups after your visit        Your next 10 appointments already scheduled     Aug 24, 2018  9:40 AM CDT   [facility] with [facility]    [address] [facility] [address]   [phone]           Please do not eat 10-12 hours before your appointment if you are coming in fasting for labs on lipids, cholesterol, or glucose (sugar). This does not apply to pregnant women. Water, hot tea and black coffee (with nothing added) are okay. Do not drink other fluids, diet soda or chew gum.            Aug 24, 2018 11:00 AM CDT   [facility] with [facility]el Ray [facility]   [facility]    [address]  [place] [id number] [phone]              Who to contact     If you have questions or need follow up information about today's clinic visit or your schedule please contact [facility] directly at [phone].  Normal or non-critical lab and imaging results will be communicated to you by MyChart, letter or phone within 4 business days after the clinic has received the results. If you do not hear from us within 7 days, please contact the clinic through [employer] or phone. If you have a critical or abnormal lab result, we will notify you by phone as soon as possible.  Submit refill requests through [employer] or call your pharmacy and they will forward the refill request to us. Please  "allow 3 business days for your refill to be completed.          Additional Information About Your Visit        [employer] Information     [employer] gives you secure access to your electronic health record. If you see a primary care provider, you can also send messages to your care team and make appointments. If you have questions, please call your primary care clinic.  If you do not have a primary care provider, please call [phone] and they will assist you.        Care EveryWhere ID     This is your Care EveryWhere ID. This could be used by other organizations to access your [place] medical records  [id number]        Your Vitals Were     Pulse Height BMI (Body Mass Index)             68 5' 9.25\" (1.759 m) 30.49 kg/m2          Blood Pressure from Last 3 Encounters:   07/25/18 132/84   02/01/18 120/82   01/15/18 130/76    Weight from Last 3 Encounters:   07/25/18 208 lb (94.3 kg)   02/01/18 215 lb (97.5 kg)   01/15/18 216 lb (98 kg)              We Performed the Following     Prostate Specific Antigen GH          Today's Medication Changes          These changes are accurate as of 7/25/18 11:17 AM.  If you have any questions, ask your nurse or doctor.               Start taking these medicines.        Dose/Directions    metoprolol succinate 50 MG 24 hr tablet   Commonly known as:  TOPROL-XL   Used for:  Benign essential hypertension   Started by:  Magdaleno Palmer MD        Dose:  50 mg   Take 1 tablet (50 mg) by mouth daily   Quantity:  90 tablet   Refills:  3         These medicines have changed or have updated prescriptions.        Dose/Directions    losartan 100 MG tablet   Commonly known as:  COZAAR   This may have changed:    - when to take this  - Another medication with the same name was removed. Continue taking this medication, and follow the directions you see here.   Used for:  Benign essential hypertension   Changed by:  Magdaleno Palmer MD        Dose:  100 mg   Take 1 tablet (100 mg) by mouth " daily   Quantity:  90 tablet   Refills:  3       nitroGLYcerin 0.4 MG sublingual tablet   Commonly known as:  NITROSTAT   This may have changed:  Another medication with the same name was removed. Continue taking this medication, and follow the directions you see here.   Changed by:  Magdaleno Palmer MD        Place 1 tab under the tongue every 5 mins if needed for Chest Pain (up to 3 doses). DON'T USE VIAGRA/CIALIS/LEVITRA WITHIN 48 HRS OF NITRO   Refills:  0         Stop taking these medicines if you haven't already. Please contact your care team if you have questions.     amLODIPine 2.5 MG tablet   Commonly known as:  NORVASC   Stopped by:  Magdaleno Palmer MD           fexofenadine 180 MG tablet   Commonly known as:  ALLEGRA   Stopped by:  Magdaleno Palmer MD           guaiFENesin-codeine 100-10 MG/5ML Soln solution   Commonly known as:  ROBITUSSIN AC   Stopped by:  Magdaleno Palmer MD           guaiFENesin-codeine 100-10 MG/5ML Syrp syrup   Commonly known as:  guaiFENesin AC   Stopped by:  Magdaleno Palmer MD           naproxen sodium 220 MG tablet   Commonly known as:  ANAPROX   Stopped by:  Magdaleno Palmer MD           XARELTO 20 MG Tabs tablet   Generic drug:  rivaroxaban ANTICOAGULANT   Stopped by:  Magdaleno Palmer MD                Where to get your medicines      These medications were sent to [facility], - [place] - [address]  [address], [place] [address]     Phone:  [phone]     losartan 100 MG tablet    metoprolol succinate 50 MG 24 hr tablet                Primary Care Provider Office Phone # Fax #    Magdaleno Palmer [phone]       [address]  [place] [address]        Equal Access to Services     [facility]: Hadii salinas austino [place], waaxda luqadaha, qaybta kaalmada adeegyada, waxay anuradha storm. So [facility] [phone].    ATENCIÓN: Si ana maría espnaveed, nicki billingsley mejia  disposición servicios gratuitos de asistencia lingüística. Manas garcía [phone].    We comply with applicable federal civil rights laws and [place] laws. We do not discriminate on the basis of race, color, national origin, age, disability, sex, sexual orientation, or gender identity.            Thank you!     Thank you for choosing [facility]  for your care. Our goal is always to provide you with excellent care. Hearing back from our patients is one way we can continue to improve our services. Please take a few minutes to complete the written survey that you may receive in the mail after your visit with us. Thank you!             Your Updated Medication List - Protect others around you: Learn how to safely use, store and throw away your medicines at www.disposemymeds.org.          This list is accurate as of 7/25/18 11:17 AM.  Always use your most recent med list.                   Brand Name Dispense Instructions for use Diagnosis    aspirin 81 MG EC tablet      Take 81 mg by mouth        losartan 100 MG tablet    COZAAR    90 tablet    Take 1 tablet (100 mg) by mouth daily    Benign essential hypertension       metoprolol succinate 50 MG 24 hr tablet    TOPROL-XL    90 tablet    Take 1 tablet (50 mg) by mouth daily    Benign essential hypertension       nitroGLYcerin 0.4 MG sublingual tablet    NITROSTAT     Place 1 tab under the tongue every 5 mins if needed for Chest Pain (up to 3 doses). DON'T USE VIAGRA/CIALIS/LEVITRA WITHIN 48 HRS OF NITRO        rosuvastatin 20 MG tablet    CRESTOR    90 tablet    Take 1 tablet (20 mg) by mouth At Bedtime    Coronary artery disease due to lipid rich plaque, Mixed hyperlipidemia

## 2018-07-26 ASSESSMENT — PATIENT HEALTH QUESTIONNAIRE - PHQ9: SUM OF ALL RESPONSES TO PHQ QUESTIONS 1-9: 0

## 2018-07-27 DIAGNOSIS — N40.1 BENIGN NODULAR PROSTATIC HYPERPLASIA WITH LOWER URINARY TRACT SYMPTOMS: Primary | ICD-10-CM

## 2018-07-27 LAB — HCV AB SERPL QL IA: NONREACTIVE

## 2018-07-31 ENCOUNTER — DOCUMENTATION ONLY (OUTPATIENT)
Dept: OTHER | Facility: CLINIC | Age: 73
End: 2018-07-31

## 2018-08-08 PROBLEM — I49.3 PVC'S (PREMATURE VENTRICULAR CONTRACTIONS): Status: RESOLVED | Noted: 2017-02-15 | Resolved: 2018-08-08

## 2018-08-08 PROBLEM — Z95.5 STENTED CORONARY ARTERY: Status: RESOLVED | Noted: 2018-02-01 | Resolved: 2018-08-08

## 2018-08-08 PROBLEM — E78.00 PURE HYPERCHOLESTEROLEMIA: Status: RESOLVED | Noted: 2018-02-01 | Resolved: 2018-08-08

## 2018-08-08 NOTE — PROGRESS NOTES
Nursing Notes:   Betty Spann LPN  7/25/2018 11:00 AM  Signed  Patient comes in to the clinic today for an annual physical.      SUBJECTIVE:  Praveen Lovell is a 73 year old male who comes in today for annual checkup on ASCVD.  Patient had drug-eluting stent placed in 2014.  He had allergy/intolerance to Plavix and has since been on Xarelto.  He reports no side effects.  He has not had any black or bloody stool.  No blood in his urine.  Feels well.  Energy level reasonable for age.  He is also on aspirin 81 mg EC.    Patient reports good exercise tolerance.  He has not had any chest pain, shortness of breath or other anginal equivalents with activity.  He has not had to utilize his nitroglycerin.  He will occasionally get very mild lower extremity edema but has not had issues with that either.  He has a history of aortic stenosis and also has a history of ascending aortic aneurysm.  He recently established care with Dr. Fernandes.  The consultation is reviewed.    For hypertension he continues on Norvasc and losartan.  No side effects.  No orthostasis.  No presyncope.    He also has a history of prostate cancer.  Follows with Dr. Ray in that regard.  He is due for repeat PSA.      PHQ-9  PHQ-9 SCORE 7/25/2018   Total Score 0         Current Outpatient Prescriptions   Medication Sig Dispense Refill     aspirin EC 81 MG EC tablet Take 81 mg by mouth       losartan (COZAAR) 100 MG tablet Take 1 tablet (100 mg) by mouth daily 90 tablet 3     metoprolol succinate (TOPROL-XL) 50 MG 24 hr tablet Take 1 tablet (50 mg) by mouth daily 90 tablet 3     nitroGLYcerin (NITROSTAT) 0.4 MG sublingual tablet Place 1 tab under the tongue every 5 mins if needed for Chest Pain (up to 3 doses). DON'T USE VIAGRA/CIALIS/LEVITRA WITHIN 48 HRS OF NITRO       rosuvastatin (CRESTOR) 20 MG tablet Take 1 tablet (20 mg) by mouth At Bedtime 90 tablet 3       Past Surgical History:   Procedure Laterality Date     OTHER SURGICAL HISTORY    "   ,HERNIA REPAIR     OTHER SURGICAL HISTORY      12/2/14,594502,CVL CORONARY ANGIOGRAM POSS PCI,Cor angio: 90% prox LAD; s/p ZAYNAB x 1 to prox LAD with PTCA of diagonal 1 that was jailed due to plaque shifting, EF 65%       Social History     Social History     Marital status:      Spouse name: N/A     Number of children: N/A     Years of education: N/A     Occupational History     Not on file.     Social History Main Topics     Smoking status: Former Smoker     Packs/day: 1.00     Years: 30.00     Types: Cigarettes     Quit date: 1/1/1990     Smokeless tobacco: Never Used      Comment: Quit smoking: quit 7 years ago     Alcohol use Not on file      Comment: Alcoholic Drinks/day: weekly     Drug use: Not on file      Comment: Drug use: Not Asked     Sexual activity: Not on file     Other Topics Concern     Not on file     Social History Narrative    [employer]ail [employer] business.  [employer].  Quit smoking around 1995.    Drinks once or twice a week per year.  .  Three daughters.  6 grandkids.  One daughter in [place] -  to Dr. Rodriguez.  Two daughters and families in [place].       I have personally reviewed and updated above noted social, family and/or past medical history.    ROS  CONSTITUTIONAL: NEGATIVE for fever, chills, change in weight  ENT/MOUTH: NEGATIVE for ear, mouth and throat problems  RESP: NEGATIVE for significant cough or SOB  CV: NEGATIVE for chest pain, palpitations or peripheral edema  GI: NEGATIVE for nausea, abdominal pain, heartburn, or change in bowel habits  : negative for dysuria, hematuria, decreased urinary stream, erectile dysfunction  MUSCULOSKELETAL: NEGATIVE for significant arthralgias or myalgia  NEURO: NEGATIVE for weakness, dizziness or paresthesias  PSYCHIATRIC: NEGATIVE for changes in mood or affect      /84  Pulse 68  Ht 5' 9.25\" (1.759 m)  Wt 208 lb (94.3 kg)  BMI 30.49 kg/m2    Exam:  GENERAL: healthy, alert and no distress  EYES: Eyes grossly normal to " inspection, PERRL and conjunctivae and sclerae normal  HENT: ear canals and TM's normal, nose and mouth without ulcers or lesions  NECK: no adenopathy, no asymmetry, masses, or scars and thyroid normal to palpation  RESP: lungs clear to auscultation - no rales, rhonchi or wheezes  CV: regular rate and rhythm, normal S1 S2, no S3 or S4, no murmur, click or rub, no peripheral edema and peripheral pulses strong  ABDOMEN: soft, nontender, no hepatosplenomegaly, no masses and bowel sounds normal  RECTAL (male): patient declined.  MS: no gross musculoskeletal defects noted, no edema  SKIN: no suspicious lesions or rashes  BACK: no CVA tenderness, no paralumbar tenderness  PSYCH: mentation appears normal, affect normal/bright  LYMPH: no cervical, supraclavicular, axillary, or inguinal adenopathy      ASSESSMENT/Plan :    I personally reviewed the patients' labs     Results for orders placed or performed in visit on 07/25/18   Prostate Specific Antigen GH   Result Value Ref Range    Prostate Specific Antigen 4.908 (H) <3.100 ng/mL   Hepatitis C Antibody   Result Value Ref Range    Hepatitis C Antibody Nonreactive NR^Nonreactive       No images are attached to the encounter or orders placed in the encounter.      1. Benign essential hypertension  I discussed with patient Dr. Fernandes's recommendation of transitioning from Norvasc to metoprolol.  I think this is very reasonable.  He will stop the Norvasc and start metoprolol succinate 50 mg daily.  Goal blood pressure less than 130/80.  He will follow-up for nurse blood pressure check next week.  BMP was stable last week.  - metoprolol succinate (TOPROL-XL) 50 MG 24 hr tablet; Take 1 tablet (50 mg) by mouth daily  Dispense: 90 tablet; Refill: 3  - losartan (COZAAR) 100 MG tablet; Take 1 tablet (100 mg) by mouth daily  Dispense: 90 tablet; Refill: 3    2. Prostate cancer (H)  PSA continues to slowly rise.  Patient will continue to follow with Dr. Ray.  Currently no history or  exam findings concerning for progressive cancer.  - Prostate Specific Antigen GH    3. Encounter for hepatitis C screening test for low risk patient  Patient has never been tested for hepatitis C.  He is at low risk but given his age recommendation would be to check.  That returned normal.  - Hepatitis C Antibody; Future  - Hepatitis C Antibody    4. Advanced directives, counseling/discussion  Discussed CODE STATUS with patient.  He continues to have high quality life and is very active.  He would like to remain full code.  Also gave patient healthcare directive to be filled out and returned patient appears to be doing    5. Coronary artery disease involving native coronary artery of native heart without angina pectoris  Quite well.  He will continue on Xarelto, Crestor, aspirin 81 mg EC.  He will now be on metoprolol in addition to his losartan.    6.  Aortic stenosis  Moderate by last echo.  Continue with care plan outlined by Dr. Fernandes        Patient Instructions   Stay on the crestor, xarelto and losartan.  Stop the norvasc and instead start the metoprolol 50mg XL.      Magdaleno Palmer    This document was created using computer generated templates and voiceactivated software.

## 2018-08-09 ENCOUNTER — TELEPHONE (OUTPATIENT)
Dept: FAMILY MEDICINE | Facility: OTHER | Age: 73
End: 2018-08-09

## 2018-08-09 NOTE — TELEPHONE ENCOUNTER
Patient's wife calling wanting an appointment. Sent her to scheduling.  Jesika Sandy LPN on 8/9/2018 at 9:19 AM

## 2018-08-13 ENCOUNTER — APPOINTMENT (OUTPATIENT)
Dept: GENERAL RADIOLOGY | Facility: OTHER | Age: 73
End: 2018-08-13
Attending: FAMILY MEDICINE
Payer: MEDICARE

## 2018-08-13 ENCOUNTER — HOSPITAL ENCOUNTER (EMERGENCY)
Facility: OTHER | Age: 73
Discharge: HOME | End: 2018-08-13
Attending: FAMILY MEDICINE | Admitting: FAMILY MEDICINE
Payer: MEDICARE

## 2018-08-13 VITALS
RESPIRATION RATE: 16 BRPM | DIASTOLIC BLOOD PRESSURE: 98 MMHG | SYSTOLIC BLOOD PRESSURE: 160 MMHG | TEMPERATURE: 97.6 F | HEIGHT: 70 IN | WEIGHT: 206 LBS | BODY MASS INDEX: 29.49 KG/M2 | OXYGEN SATURATION: 94 %

## 2018-08-13 DIAGNOSIS — R10.11 ABDOMINAL PAIN, RIGHT UPPER QUADRANT: ICD-10-CM

## 2018-08-13 LAB
ALBUMIN SERPL-MCNC: 4.3 G/DL (ref 3.5–5.7)
ALBUMIN UR-MCNC: 30 MG/DL
ALP SERPL-CCNC: 100 U/L (ref 34–104)
ALT SERPL W P-5'-P-CCNC: 20 U/L (ref 7–52)
ANION GAP SERPL CALCULATED.3IONS-SCNC: 7 MMOL/L (ref 3–14)
APPEARANCE UR: CLEAR
AST SERPL W P-5'-P-CCNC: 17 U/L (ref 13–39)
BASOPHILS # BLD AUTO: 0.1 10E9/L (ref 0–0.2)
BASOPHILS NFR BLD AUTO: 0.6 %
BILIRUB SERPL-MCNC: 0.6 MG/DL (ref 0.3–1)
BILIRUB UR QL STRIP: NEGATIVE
BUN SERPL-MCNC: 22 MG/DL (ref 7–25)
CALCIUM SERPL-MCNC: 9.4 MG/DL (ref 8.6–10.3)
CHLORIDE SERPL-SCNC: 105 MMOL/L (ref 98–107)
CO2 SERPL-SCNC: 27 MMOL/L (ref 21–31)
COLOR UR AUTO: YELLOW
CREAT SERPL-MCNC: 1.07 MG/DL (ref 0.7–1.3)
DIFFERENTIAL METHOD BLD: NORMAL
EOSINOPHIL # BLD AUTO: 0.2 10E9/L (ref 0–0.7)
EOSINOPHIL NFR BLD AUTO: 2.7 %
ERYTHROCYTE [DISTWIDTH] IN BLOOD BY AUTOMATED COUNT: 12.6 % (ref 10–15)
GFR SERPL CREATININE-BSD FRML MDRD: 67 ML/MIN/1.7M2
GLUCOSE SERPL-MCNC: 120 MG/DL (ref 70–105)
GLUCOSE UR STRIP-MCNC: NEGATIVE MG/DL
HCT VFR BLD AUTO: 41.2 % (ref 40–53)
HGB BLD-MCNC: 14.3 G/DL (ref 13.3–17.7)
HGB UR QL STRIP: NEGATIVE
IMM GRANULOCYTES # BLD: 0 10E9/L (ref 0–0.4)
IMM GRANULOCYTES NFR BLD: 0.1 %
KETONES UR STRIP-MCNC: NEGATIVE MG/DL
LEUKOCYTE ESTERASE UR QL STRIP: NEGATIVE
LYMPHOCYTES # BLD AUTO: 1 10E9/L (ref 0.8–5.3)
LYMPHOCYTES NFR BLD AUTO: 12 %
MCH RBC QN AUTO: 32.1 PG (ref 26.5–33)
MCHC RBC AUTO-ENTMCNC: 34.7 G/DL (ref 31.5–36.5)
MCV RBC AUTO: 93 FL (ref 78–100)
MONOCYTES # BLD AUTO: 0.6 10E9/L (ref 0–1.3)
MONOCYTES NFR BLD AUTO: 6.8 %
NEUTROPHILS # BLD AUTO: 6.5 10E9/L (ref 1.6–8.3)
NEUTROPHILS NFR BLD AUTO: 77.8 %
NITRATE UR QL: NEGATIVE
PH UR STRIP: 6 PH (ref 5–9)
PLATELET # BLD AUTO: 184 10E9/L (ref 150–450)
POTASSIUM SERPL-SCNC: 4.2 MMOL/L (ref 3.5–5.1)
PROT SERPL-MCNC: 7.1 G/DL (ref 6.4–8.9)
RBC # BLD AUTO: 4.45 10E12/L (ref 4.4–5.9)
RBC #/AREA URNS AUTO: NORMAL /HPF
SODIUM SERPL-SCNC: 139 MMOL/L (ref 134–144)
SOURCE: ABNORMAL
SP GR UR STRIP: >1.03 (ref 1–1.03)
UROBILINOGEN UR STRIP-ACNC: 0.2 EU/DL (ref 0.2–1)
WBC # BLD AUTO: 8.4 10E9/L (ref 4–11)
WBC #/AREA URNS AUTO: NORMAL /HPF

## 2018-08-13 PROCEDURE — 81001 URINALYSIS AUTO W/SCOPE: CPT | Performed by: FAMILY MEDICINE

## 2018-08-13 PROCEDURE — 85025 COMPLETE CBC W/AUTO DIFF WBC: CPT | Performed by: FAMILY MEDICINE

## 2018-08-13 PROCEDURE — 74019 RADEX ABDOMEN 2 VIEWS: CPT

## 2018-08-13 PROCEDURE — 80053 COMPREHEN METABOLIC PANEL: CPT | Performed by: FAMILY MEDICINE

## 2018-08-13 PROCEDURE — 36415 COLL VENOUS BLD VENIPUNCTURE: CPT | Performed by: FAMILY MEDICINE

## 2018-08-13 PROCEDURE — 99283 EMERGENCY DEPT VISIT LOW MDM: CPT | Mod: Z6 | Performed by: FAMILY MEDICINE

## 2018-08-13 PROCEDURE — 99284 EMERGENCY DEPT VISIT MOD MDM: CPT | Mod: 25 | Performed by: FAMILY MEDICINE

## 2018-08-13 NOTE — ED AVS SNAPSHOT
[facility] [place] [facility]    [address]    [place] [address]    Phone:  [phone]    Fax:  [phone]                                       Praveen Lovell   MRN: [id number]    Department:  [facility] [place] [facility]   Date of Visit:  8/13/2018           After Visit Summary Signature Page     I have received my discharge instructions, and my questions have been answered. I have discussed any challenges I see with this plan with the nurse or doctor.    ..........................................................................................................................................  Patient/Patient Representative Signature      ..........................................................................................................................................  Patient Representative Print Name and Relationship to Patient    ..................................................               ................................................  Date                                            Time    ..........................................................................................................................................  Reviewed by Signature/Title    ...................................................              ..............................................  Date                                                            Time

## 2018-08-13 NOTE — ED AVS SNAPSHOT
[facility]    [address]    [place] [address]    Phone:  [phone]    Fax:  [phone]                                       Praveen Lovell   MRN: [id number]    Department:  [facility]   Date of Visit:  8/13/2018           Patient Information     Date Of Birth          1945        Your diagnoses for this visit were:     Abdominal pain, right upper quadrant        You were seen by Napoleon Ram MD.      Follow-up Information     Follow up with [facility].    Specialty:  EMERGENCY MEDICINE    Why:  As needed    Contact information:    [address]  [place] [address] [phone]        Discharge Instructions         Abdominal Pain    Abdominal pain is pain in the stomach or belly area. Everyone has this pain from time to time. In many cases it goes away on its own. But abdominal pain can sometimes be due to a serious problem, such as appendicitis. So it s important to know when to seek help.  Causes of abdominal pain  There are many possible causes of abdominal pain. Common causes in adults include:    Constipation, diarrhea, or gas    Stomach acid flowing back up into the esophagus (acid reflux or heartburn)    Severe acid reflux, called GERD (gastroesophageal reflux disease)    A sore in the lining of the stomach or small intestine (peptic ulcer)    Inflammation of the gallbladder, liver, or pancreas    Gallstones or kidney stones    Appendicitis     Intestinal blockage     An internal organ pushing through a muscle or other tissue (hernia)    Urinary tract infections    In women, menstrual cramps, fibroids, or endometriosis    Inflammation or infection of the intestines  Diagnosing the cause of abdominal pain  Your healthcare provider will do a physical exam help find the cause of your pain. If needed, tests will be ordered. Belly pain has many possible causes. So it can be hard to  find the reason for your pain. Giving details about your pain can help. Tell your provider where and when you feel the pain, and what makes it better or worse. Also let your provider know if you have other symptoms such as:    Fever    Tiredness    Upset stomach (nausea)    Vomiting    Changes in bathroom habits  Treating abdominal pain  Some causes of pain need emergency medical treatment right away. These include appendicitis or a bowel blockage. Other problems can be treated with rest, fluids, or medicines. Your healthcare provider can give you specific instructions for treatment or self-care based on what is causing your pain.  If you have vomiting or diarrhea, sip water or other clear fluids. When you are ready to eat solid foods again, start with small amounts of easy-to-digest, low-fat foods. These include apple sauce, toast, or crackers.   When to seek medical care  Call 911 or go to the hospital right away if you:    Can t pass stool and are vomiting    Are vomiting blood or have bloody diarrhea or black, tarry diarrhea    Have chest, neck, or shoulder pain    Feel like you might pass out    Have pain in your shoulder blades with nausea    Have sudden, severe belly pain    Have new, severe pain unlike any you have felt before    Have a belly that is rigid, hard, and tender to touch  Call your healthcare provider if you have:    Pain for more than 5 days    Bloating for more than 2 days    Diarrhea for more than 5 days    A fever of 100.4 F (38 C) or higher, or as directed by your healthcare provider    Pain that gets worse    Weight loss for no reason    Continued lack of appetite    Blood in your stool  How to prevent abdominal pain  Here are some tips to help prevent abdominal pain:    Eat smaller amounts of food at one time.    Avoid greasy, fried, or other high-fat foods.    Avoid foods that give you gas.    Exercise regularly.    Drink plenty of fluids.  To help prevent GERD symptoms:    Quit  smoking.    Reduce alcohol and certain foods that increase stomach acid.    Avoid aspirin and over-the-counter pain and fever medicines (NSAIDS or nonsteroidal anti-inflammatory drugs), if possible    Lose extra weight.    Finish eating at least 2 hours before you go to bed or lie down.    Raise the head of your bed.  Date Last Reviewed: 7/1/2016 2000-2017 [employer]. [address] [place] [address], [place] [address]. All rights reserved. This information is not intended as a substitute for professional medical care. Always follow your healthcare professional's instructions.          Your next 10 appointments already scheduled     Aug 24, 2018  9:40 AM CDT   LAB with [facility] LAB   [facility]    [facility] [address] [facility] [id number] [phone]           Please do not eat 10-12 hours before your appointment if you are coming in fasting for labs on lipids, cholesterol, or glucose (sugar). This does not apply to pregnant women. Water, hot tea and black coffee (with nothing added) are okay. Do not drink other fluids, diet soda or chew gum.            Aug 24, 2018 11:00 AM CDT   Return Visit with Romel Ray MD   [facility] ([facility])    [address] [facility] [address] [facility] [id number] [phone]            Sep 05, 2018  9:30 AM CDT   Office Visit with Magdaleno Palmer MD   [facility]    [facility] [id number] [phone]           Bring a current list of meds and any records pertaining to this visit. For Physicals, please bring immunization records and any forms needing to be filled out. Please arrive 10 minutes early to complete paperwork.              24 Hour Appointment Hotline     To schedule an appointment at [facility], please call [phone]. If you don't have a family doctor or clinic, we will help  you find one. [facility] [place] [facility] are conveniently located to serve the needs of you and your family.           Review of your medicines      Our records show that you are taking the medicines listed below. If these are incorrect, please call your family doctor or clinic.        Dose / Directions Last dose taken    aspirin 81 MG EC tablet   Dose:  81 mg        Take 81 mg by mouth   Refills:  0        losartan 100 MG tablet   Commonly known as:  COZAAR   Dose:  100 mg   Quantity:  90 tablet        Take 1 tablet (100 mg) by mouth daily   Refills:  3        metoprolol succinate 50 MG 24 hr tablet   Commonly known as:  TOPROL-XL   Dose:  50 mg   Quantity:  90 tablet        Take 1 tablet (50 mg) by mouth daily   Refills:  3        nitroGLYcerin 0.4 MG sublingual tablet   Commonly known as:  NITROSTAT        Place 1 tab under the tongue every 5 mins if needed for Chest Pain (up to 3 doses). DON'T USE VIAGRA/CIALIS/LEVITRA WITHIN 48 HRS OF NITRO   Refills:  0        rosuvastatin 20 MG tablet   Commonly known as:  CRESTOR   Dose:  20 mg   Quantity:  90 tablet        Take 1 tablet (20 mg) by mouth At Bedtime   Refills:  3                Procedures and tests performed during your visit     *UA reflex to Microscopic    CBC with platelets differential    Comprehensive metabolic panel    Urine Microscopic    XR Abdomen 2 Views      Orders Needing Specimen Collection     None      Pending Results     Date and Time Order Name Status Description    8/13/2018 1208 XR Abdomen 2 Views In process             Pending Culture Results     No orders found from 8/11/2018 to 8/14/2018.            Pending Results Instructions     If you had any lab results that were not finalized at the time of your Discharge, you can call the ED Lab Result RN at [phone]. You will be contacted by this team for any positive Lab results or changes in treatment. The nurses are available 7 days a week from 10A to 6:30P.  You can leave a message 24 hours per day  and they will return your call.        Thank you for choosing [facility]       Thank you for choosing [facility] for your care. Our goal is always to provide you with excellent care. Hearing back from our patients is one way we can continue to improve our services. Please take a few minutes to complete the written survey that you may receive in the mail after you visit with us. Thank you!        [facility] Information     [facility] gives you secure access to your electronic health record. If you see a primary care provider, you can also send messages to your care team and make appointments. If you have questions, please call your primary care clinic.  If you do not have a primary care provider, please call [phone] and they will assist you.        Care EveryWhere ID     This is your Care EveryWhere ID. This could be used by other organizations to access your [facility] medical records  [id number]        Equal Access to Services     DAISY INGRAM : Yossi Light, jones king, georges hebert. [facility] [phone].    ATENCIÓN: Si habla español, tiene a mejia disposición servicios gratuitos de asistencia lingüística. Llame al [phone].    We comply with applicable federal civil rights laws and [place] laws. We do not discriminate on the basis of race, color, national origin, age, disability, sex, sexual orientation, or gender identity.            After Visit Summary       This is your record. Keep this with you and show to your community pharmacist(s) and doctor(s) at your next visit.

## 2018-08-13 NOTE — ED TRIAGE NOTES
Patient presents from home with c/o right sided abdominal/flank pain that started a couple days ago. Does not recall any injury. Denies any urinary symptoms. Denies fever.

## 2018-08-13 NOTE — ED NOTES
COLUMBIA-SUICIDE SEVERITY RATING SCALE   Screen with Triage Points for Emergency Department      Ask questions that are bolded and underlined.   Past  month   Ask Questions 1 and 2 YES NO   1)  Have you wished you were dead or wished you could go to sleep and not wake up?   x   2)  Haxve you actually had any thoughts of killing yourself?   x   If YES to 2, ask questions 3, 4, 5, and 6.  If NO to 2, go directly to question 6.   3)  Have you been thinking about how you might do this?   E.g.  I thought about taking an overdose but I never made a specific plan as to when where or how I would actually do it .and I would never go through with it.       4)  Have you had these thoughts and had some intention of acting on them?   As opposed to  I have the thoughts but I definitely will not do anything about them.       5)  Have you started to work out or worked out the details of how to kill yourself? Do you intend to carry out this plan?      6)  Have you ever done anything, started to do anything, or prepared to do anything to end your life?  Examples: Collected pills, obtained a gun, gave away valuables, wrote a will or suicide note, took out pills but didn t swallow any, held a gun but changed your mind or it was grabbed from your hand, went to the roof but didn t jump; or actually took pills, tried to shoot yourself, cut yourself, tried to hang yourself, etc.    If YES, ask: Was this within the past three months?  Lifetime     x    Past 3 Months        Item 1:  Behavioral Health Referral at Discharge  Item 2:  Behavioral Health Referral at Discharge   Item 3:  Behavioral Health Consult (Psychiatric Nurse/) and consider Patient Safety Precautions  Item 4:  Immediate Notification of Physician and/or Behavioral Health and Patient Safety Precautions   Item 5:  Immediate Notification of Physician and/or Behavioral Health and Patient Safety Precautions  Item 6:  Over 3 months ago: Behavioral Health Consult  (Psychiatric Nurse/) and consider Patient Safety Precautions  OR  Item 6:  3 months ago or less: Immediate Notification of Physician and/or Behavioral Health and Patient Safety Precautions

## 2018-08-13 NOTE — ED PROVIDER NOTES
History     Chief Complaint   Patient presents with     Abdominal Pain     HPI  Praveen Lovell is a 73 year old male who right lower quadrant abdominal pain and into the lower back for 2 days.  It comes and goes, never truly goes away.  He has had no fevers.  No problems eating.  It is currently barely bothering him.  No hx of ureteral stones.  No urinary symptoms.  No N/V/D.    Cannot press on it and make it hurt.  No rash or shingles.    Has had an inguinal hernia repaired in his past.  No current problems in this area or in the scrotum.    Problem List:    Patient Active Problem List    Diagnosis Date Noted     Benign essential hypertension 06/21/2018     Priority: Medium     Aortic stenosis, mild 02/01/2018     Priority: Medium     Ascending aortic aneurysm (H) 02/01/2018     Priority: Medium     Bicuspid aortic valve 02/01/2018     Priority: Medium     Obesity 02/01/2018     Priority: Medium     Benign nodular prostatic hyperplasia with lower urinary tract symptoms 07/22/2016     Priority: Medium     Dyslipidemia 01/02/2015     Priority: Medium     CAD (coronary artery disease) 12/02/2014     Priority: Medium     Overview:   - 12/2/14 Cor angio: 90% prox LAD; s/p ZAYNAB x 1 to prox LAD with PTCA of diagonal 1 that was jailed due to plaque shifting, EF 65%        Prostate cancer (H) 10/16/2013     Priority: Medium     ED (erectile dysfunction) 09/25/2013     Priority: Medium        Past Medical History:    Past Medical History:   Diagnosis Date     Atherosclerotic heart disease of native coronary artery without angina pectoris      Essential (primary) hypertension      Lyme disease      Malignant neoplasm of prostate (H)      Pain in knee      PVC's (premature ventricular contractions) 2/15/2017     Stented coronary artery 2/1/2018     Unilateral inguinal hernia without obstruction or gangrene        Past Surgical History:    Past Surgical History:   Procedure Laterality Date     OTHER SURGICAL HISTORY       ",HERNIA REPAIR     OTHER SURGICAL HISTORY      12/2/14,985378,CVL CORONARY ANGIOGRAM POSS PCI,Cor angio: 90% prox LAD; s/p ZAYNAB x 1 to prox LAD with PTCA of diagonal 1 that was jailed due to plaque shifting, EF 65%       Family History:    Family History   Problem Relation Age of Onset     Arthritis Mother      Arthritis     Other - See Comments Mother 96     Stroke     Colon Cancer Paternal Aunt      Cancer-colon     Prostate Cancer Paternal Uncle 70     Cancer-prostate     HEART DISEASE Sister      Heart Disease       Social History:  Marital Status:   [2]  Social History   Substance Use Topics     Smoking status: Former Smoker     Packs/day: 1.00     Years: 30.00     Types: Cigarettes     Quit date: 1/1/1990     Smokeless tobacco: Never Used      Comment: Quit smoking: quit 7 years ago     Alcohol use 1.8 oz/week      Comment: Alcoholic Drinks/day: weekly        Medications:      aspirin EC 81 MG EC tablet   losartan (COZAAR) 100 MG tablet   metoprolol succinate (TOPROL-XL) 50 MG 24 hr tablet   rosuvastatin (CRESTOR) 20 MG tablet   nitroGLYcerin (NITROSTAT) 0.4 MG sublingual tablet         Review of Systems  No fevers, chills, rigors, CP, SOB, flank pain  Physical Exam   BP: (!) 194/101  Heart Rate: 66  Temp: 97.6  F (36.4  C)  Resp: 16  Height: 177.8 cm (5' 10\")  Weight: 93.4 kg (206 lb)  SpO2: 94 %      Physical Exam  Well man.  BP descends over time here, recorded by nursing.  Heart reg.  Lungs clear.  abd soft, NT, ND, NABS, negative Alejandre's and McBurney's.  No hernias noted.  No masses.  No hernia at inguinal ring.  Scrotum with no masses or tenderness.  No CVAT.  I cannot induce any tenderness in the area where he says hurts.  ED Course     ED Course     Procedures               Critical Care time:  none               Results for orders placed or performed during the hospital encounter of 08/13/18 (from the past 24 hour(s))   *UA reflex to Microscopic   Result Value Ref Range    Color Urine " Yellow     Appearance Urine Clear     Glucose Urine Negative NEG^Negative mg/dL    Bilirubin Urine Negative NEG^Negative    Ketones Urine Negative NEG^Negative mg/dL    Specific Gravity Urine >1.030 (H) 1.000 - 1.030    Blood Urine Negative NEG^Negative    pH Urine 6.0 5.0 - 9.0 pH    Protein Albumin Urine 30 (A) NEG^Negative mg/dL    Urobilinogen Urine 0.2 0.2 - 1.0 EU/dL    Nitrite Urine Negative NEG^Negative    Leukocyte Esterase Urine Negative NEG^Negative    Source Unspecified Urine    Urine Microscopic   Result Value Ref Range    WBC Urine 0 - 5 OTO5^0 - 5 /HPF    RBC Urine O - 2 OTO2^O - 2 /HPF   CBC with platelets differential   Result Value Ref Range    WBC 8.4 4.0 - 11.0 10e9/L    RBC Count 4.45 4.4 - 5.9 10e12/L    Hemoglobin 14.3 13.3 - 17.7 g/dL    Hematocrit 41.2 40.0 - 53.0 %    MCV 93 78 - 100 fl    MCH 32.1 26.5 - 33.0 pg    MCHC 34.7 31.5 - 36.5 g/dL    RDW 12.6 10.0 - 15.0 %    Platelet Count 184 150 - 450 10e9/L    Diff Method Automated Method     % Neutrophils 77.8 %    % Lymphocytes 12.0 %    % Monocytes 6.8 %    % Eosinophils 2.7 %    % Basophils 0.6 %    % Immature Granulocytes 0.1 %    Absolute Neutrophil 6.5 1.6 - 8.3 10e9/L    Absolute Lymphocytes 1.0 0.8 - 5.3 10e9/L    Absolute Monocytes 0.6 0.0 - 1.3 10e9/L    Absolute Eosinophils 0.2 0.0 - 0.7 10e9/L    Absolute Basophils 0.1 0.0 - 0.2 10e9/L    Abs Immature Granulocytes 0.0 0 - 0.4 10e9/L   Comprehensive metabolic panel   Result Value Ref Range    Sodium 139 134 - 144 mmol/L    Potassium 4.2 3.5 - 5.1 mmol/L    Chloride 105 98 - 107 mmol/L    Carbon Dioxide 27 21 - 31 mmol/L    Anion Gap 7 3 - 14 mmol/L    Glucose 120 (H) 70 - 105 mg/dL    Urea Nitrogen 22 7 - 25 mg/dL    Creatinine 1.07 0.70 - 1.30 mg/dL    GFR Estimate 67 >60 mL/min/1.7m2    GFR Estimate If Black 82 >60 mL/min/1.7m2    Calcium 9.4 8.6 - 10.3 mg/dL    Bilirubin Total 0.6 0.3 - 1.0 mg/dL    Albumin 4.3 3.5 - 5.7 g/dL    Protein Total 7.1 6.4 - 8.9 g/dL    Alkaline  Phosphatase 100 34 - 104 U/L    ALT 20 7 - 52 U/L    AST 17 13 - 39 U/L       Medications - No data to display    Assessments & Plan (with Medical Decision Making)     I have reviewed the nursing notes.    I have reviewed the findings, diagnosis, plan and need for follow up with the patient.   xray negative.  He does have some gas and stool there.  Diagnostics otherwise very normal.  Doubt acute, emergent surgical or infectious abdominal issue at this time.     Would recommend one-half bottle mag Citrate, and follow up with primary care if not resolving.    New Prescriptions    No medications on file       Final diagnoses:   Abdominal pain, right upper quadrant       8/13/2018   [facility] AND [facility]     RamNapoleon MD  08/13/18 [id number]

## 2018-08-13 NOTE — DISCHARGE INSTRUCTIONS

## 2018-08-16 ENCOUNTER — MYC MEDICAL ADVICE (OUTPATIENT)
Dept: FAMILY MEDICINE | Facility: OTHER | Age: 73
End: 2018-08-16

## 2018-08-24 ENCOUNTER — OFFICE VISIT (OUTPATIENT)
Dept: UROLOGY | Facility: OTHER | Age: 73
End: 2018-08-24
Attending: UROLOGY
Payer: COMMERCIAL

## 2018-08-24 VITALS
SYSTOLIC BLOOD PRESSURE: 130 MMHG | WEIGHT: 207.4 LBS | BODY MASS INDEX: 29.76 KG/M2 | RESPIRATION RATE: 14 BRPM | DIASTOLIC BLOOD PRESSURE: 78 MMHG | HEART RATE: 66 BPM

## 2018-08-24 DIAGNOSIS — R39.15 URINARY URGENCY: ICD-10-CM

## 2018-08-24 DIAGNOSIS — C61 PROSTATE CANCER (H): Primary | ICD-10-CM

## 2018-08-24 PROCEDURE — G0463 HOSPITAL OUTPT CLINIC VISIT: HCPCS

## 2018-08-24 PROCEDURE — 99213 OFFICE O/P EST LOW 20 MIN: CPT | Performed by: UROLOGY

## 2018-08-24 RX ORDER — TOLTERODINE TARTRATE 2 MG/1
2 TABLET, EXTENDED RELEASE ORAL 2 TIMES DAILY
Qty: 180 TABLET | Refills: 3 | Status: SHIPPED | OUTPATIENT
Start: 2018-08-24 | End: 2019-09-26

## 2018-08-24 ASSESSMENT — PAIN SCALES - GENERAL: PAINLEVEL: MILD PAIN (3)

## 2018-08-24 NOTE — PROGRESS NOTES
Type of Visit  EST    Chief Complaint  Prostate cancer  Urinary urgency    HPI  Mr. Lovell is a 73 y.o. male with clinical low risk prostate cancer (since 10/2013) on active surveillance.   He continues to report no changes in the last 6 months.  He has been on active surveillance for almost 5 years.  He denies unintentional weight loss, bone pain or pelvic pain.    He also mentions urinary urgency.  He does drink coffee throughout the day.  He has taken Detrol in the past with success and is asking for refill.  He denies dysuria or hematuria      Review of Systems  I reviewed the ROS the patient today.    Nursing Notes:   Teresa Douglas LPN  8/24/2018 11:25 AM  Signed  Pt present to clinic for a six month follow up on  BPH    Review of Systems:    Weight loss:    No     Recent fever/chills:  No   Night sweats:   No  Current skin rash:  No   Recent hair loss:  No  Heat intolerance:  No   Cold intolerance:  No  Chest pain:   No   Palpitations:   No  Shortness of breath:  No   Wheezing:   No  Constipation:    Yes   Diarrhea:   Yes   Nausea:   No   Vomiting:   No   Kidney/side pain:  Yes   Back pain:   No  Frequent headaches:  No   Dizziness:     No  Leg swelling:   No   Calf pain:    No        Physical Exam  /78 (BP Location: Left arm, Patient Position: Sitting, Cuff Size: Adult Regular)  Pulse 66  Resp 14  Wt 94.1 kg (207 lb 6.4 oz)  BMI 29.76 kg/m2  Constitutional: NAD, WDWN.  Cardiovascular: Regular rate.  Pulmonary/Chest: Respirations are even and non-labored bilaterally.  Abdominal: Soft. No distension, tenderness, masses or guarding. No CVA tenderness.  Extremities: GLORIA x 4, Warm. No clubbing.  No cyanosis.    Skin: Pink, warm and dry.  No rashes noted.  Genitourinary:  Nonpalpable bladder  Prostate:  40 grams.  Symmetric, non-tender, and no induration.      Labs  Results for LIBORIO LOVELL (MRN [id number]) as of 8/24/2018 11:26   7/25/2018 11:17   Prostate Specific Antigen 4.908 (H)      Results for LIBORIO LOVELL (MRN [id number]) as of 1/15/2018 10:05   9/24/2014 11:10 3/25/2015 11:00 9/30/2015 10:51 12/16/2015 10:00 7/22/2016 09:10 1/26/2017 08:02 7/20/2017 08:51 1/12/2018 11:00   PSA TOTAL (DIAGNOSTIC) 2.450 2.680 3.389 (H) 3.053 2.958 3.345 (H) 3.225 (H) 3.585 (H)       Pathology  10/9/2013  Grade 3+3=6 prostate cancer in 2/12 cores (15-20%)    12/16/2015  Grade 3+3=6 prostate cancer in 2/12 cores (80%)    Oncotype Dx    (from the 10/2013 specimen)  GPS:     24  Likelihood of favorable pathology: 61%    PSA:       3.389  Calculated prostate volume based on TRUS: 56 grams  PSA Density:      0.06 ng/mL/g  T stage:      cT2a    Assessment  Mr. Lovell is a 73 y.o. male with initial low risk prostate cancer (cT2a) on active surveillance and erectile dysfunction.  PSA stable  Also discussed urinary urgency issues.    Plan  Continue active surveillance for prostate cancer with PSA q6 months  Detrol 2mg BID as needed for urinary urgency

## 2018-08-24 NOTE — MR AVS SNAPSHOT
After Visit Summary   8/24/2018    Praveen Lovell    MRN: [id number]           Patient Information     Date Of Birth          1945        Visit Information        Provider Department      8/24/2018 11:00 AM Romel Ray MD [facility]        Today's Diagnoses     Prostate cancer (H)    -  1    Urinary urgency           Follow-ups after your visit        Your next 10 appointments already scheduled     Sep 05, 2018  9:30 AM CDT   Office Visit with Magdaleno Palmer MD   [facility] ([facility])    [address]  [place] [address] [phone]           Bring a current list of meds and any records pertaining to this visit. For Physicals, please bring immunization records and any forms needing to be filled out. Please arrive 10 minutes early to complete paperwork.            Feb 18, 2019  9:10 AM CST   LAB with  LAB   [facility] ([facility])    [address] [facility] [address] [phone]           Please do not eat 10-12 hours before your appointment if you are coming in fasting for labs on lipids, cholesterol, or glucose (sugar). This does not apply to pregnant women. Water, hot tea and black coffee (with nothing added) are okay. Do not drink other fluids, diet soda or chew gum.            Feb 25, 2019  9:30 AM CST   Return Visit with Romel Ray MD   [facility] ([facility])    [address]  [place] [address] [phone]              Who to contact     If you have questions or need follow up information about today's clinic visit or your schedule please contact [facility] directly at [phone].  Normal or non-critical lab and imaging results will be communicated to you by MyChart, letter or phone within 4 business days after the clinic has received the results.  If you do not hear from us within 7 days, please contact the clinic through [employer] or phone. If you have a critical or abnormal lab result, we will notify you by phone as soon as possible.  Submit refill requests through [employer] or call your pharmacy and they will forward the refill request to us. Please allow 3 business days for your refill to be completed.          Additional Information About Your Visit        [employer] Information     [employer] gives you secure access to your electronic health record. If you see a primary care provider, you can also send messages to your care team and make appointments. If you have questions, please call your primary care clinic.  If you do not have a primary care provider, please call [phone] and they will assist you.        Care EveryWhere ID     This is your Care EveryWhere ID. This could be used by other organizations to access your [facility] medical records  [id number]        Your Vitals Were     Pulse Respirations BMI (Body Mass Index)             66 14 29.76 kg/m2          Blood Pressure from Last 3 Encounters:   08/24/18 130/78   08/13/18 (!) 160/98   07/25/18 132/84    Weight from Last 3 Encounters:   08/24/18 94.1 kg (207 lb 6.4 oz)   08/13/18 93.4 kg (206 lb)   07/25/18 94.3 kg (208 lb)              Today, you had the following     No orders found for display         Today's Medication Changes          These changes are accurate as of 8/24/18 12:43 PM.  If you have any questions, ask your nurse or doctor.               Start taking these medicines.        Dose/Directions    tolterodine 2 MG tablet   Commonly known as:  DETROL   Used for:  Urinary urgency   Started by:  Romel Ray MD        Dose:  2 mg   Take 1 tablet (2 mg) by mouth 2 times daily   Quantity:  180 tablet   Refills:  3            Where to get your medicines      These medications were sent to [facility], - [place] - [address]  [address], [place] [address]     Phone:  [phone]     tolterodine 2 MG tablet                Primary Care Provider Office Phone # Fax #    Magdaleno Palmer [phone]       [address]  [place] [address]        Equal Access to Services     [facility] Hospital[facility]ATTIE : Hadii aad ku hadasho Soomaali, waaxda luqadaha, qaybta kaalmada adeegyada, waxay anuradha hayrajeshn terry gonzalezmontanachelsey storm. So [facility] [phone].    ATENCIÓN: Si habla español, tiene a mejia disposición servicios gratuitos de asistencia lingüística. Llame al [phone].    We comply with applicable federal civil rights laws and [place] laws. We do not discriminate on the basis of race, color, national origin, age, disability, sex, sexual orientation, or gender identity.            Thank you!     Thank you for choosing [facility] AND [facility]  for your care. Our goal is always to provide you with excellent care. Hearing back from our patients is one way we can continue to improve our services. Please take a few minutes to complete the written survey that you may receive in the mail after your visit with us. Thank you!             Your Updated Medication List - Protect others around you: Learn how to safely use, store and throw away your medicines at www.disposemymeds.org.          This list is accurate as of 8/24/18 12:43 PM.  Always use your most recent med list.                   Brand Name Dispense Instructions for use Diagnosis    aspirin 81 MG EC tablet      Take 81 mg by mouth        losartan 100 MG tablet    COZAAR    90 tablet    Take 1 tablet (100 mg) by mouth daily    Benign essential hypertension       metoprolol succinate 50 MG 24 hr tablet    TOPROL-XL    90 tablet    Take 1 tablet (50 mg) by mouth daily    Benign essential hypertension       nitroGLYcerin 0.4 MG sublingual tablet    NITROSTAT     Place 1 tab under the tongue every 5 mins if needed for Chest Pain (up to 3 doses). DON'T USE VIAGRA/CIALIS/LEVITRA WITHIN 48 HRS  OF NITRO        rosuvastatin 20 MG tablet    CRESTOR    90 tablet    Take 1 tablet (20 mg) by mouth At Bedtime    Coronary artery disease due to lipid rich plaque, Mixed hyperlipidemia       tolterodine 2 MG tablet    DETROL    180 tablet    Take 1 tablet (2 mg) by mouth 2 times daily    Urinary urgency

## 2018-08-24 NOTE — NURSING NOTE
Pt present to clinic for a six month follow up on  BPH    Review of Systems:    Weight loss:    No     Recent fever/chills:  No   Night sweats:   No  Current skin rash:  No   Recent hair loss:  No  Heat intolerance:  No   Cold intolerance:  No  Chest pain:   No   Palpitations:   No  Shortness of breath:  No   Wheezing:   No  Constipation:    Yes   Diarrhea:   Yes   Nausea:   No   Vomiting:   No   Kidney/side pain:  Yes   Back pain:   No  Frequent headaches:  No   Dizziness:     No  Leg swelling:   No   Calf pain:    No

## 2018-09-05 ENCOUNTER — OFFICE VISIT (OUTPATIENT)
Dept: FAMILY MEDICINE | Facility: OTHER | Age: 73
End: 2018-09-05
Attending: FAMILY MEDICINE
Payer: COMMERCIAL

## 2018-09-05 DIAGNOSIS — I10 BENIGN ESSENTIAL HYPERTENSION: ICD-10-CM

## 2018-09-05 DIAGNOSIS — I25.10 CORONARY ARTERY DISEASE INVOLVING NATIVE CORONARY ARTERY OF NATIVE HEART WITHOUT ANGINA PECTORIS: ICD-10-CM

## 2018-09-05 DIAGNOSIS — C61 PROSTATE CANCER (H): ICD-10-CM

## 2018-09-05 DIAGNOSIS — I35.0 NONRHEUMATIC AORTIC VALVE STENOSIS: ICD-10-CM

## 2018-09-05 DIAGNOSIS — N40.1 BENIGN NODULAR PROSTATIC HYPERPLASIA WITH LOWER URINARY TRACT SYMPTOMS: ICD-10-CM

## 2018-09-05 DIAGNOSIS — Z12.11 SPECIAL SCREENING FOR MALIGNANT NEOPLASMS, COLON: Primary | ICD-10-CM

## 2018-09-05 PROCEDURE — G0463 HOSPITAL OUTPT CLINIC VISIT: HCPCS

## 2018-09-05 PROCEDURE — 99214 OFFICE O/P EST MOD 30 MIN: CPT | Performed by: FAMILY MEDICINE

## 2018-09-05 PROCEDURE — 93010 ELECTROCARDIOGRAM REPORT: CPT | Performed by: INTERNAL MEDICINE

## 2018-09-05 ASSESSMENT — PAIN SCALES - GENERAL: PAINLEVEL: MILD PAIN (3)

## 2018-09-05 NOTE — MR AVS SNAPSHOT
After Visit Summary   9/5/2018    Praveen Lovell    MRN: [id number]           Patient Information     Date Of Birth          1945        Visit Information        Provider Department      9/5/2018 9:30 AM Magdaleno Palmer MD [facility]        Today's Diagnoses     Special screening for malignant neoplasms, colon    -  1    Benign essential hypertension        Coronary artery disease involving native coronary artery of native heart without angina pectoris        Benign nodular prostatic hyperplasia with lower urinary tract symptoms        Nonrheumatic aortic valve stenosis        Prostate cancer, low risk, on active surveillance           Follow-ups after your visit        Additional Services     CARDIOLOGY EVAL ADULT REFERRAL       Preferred location:  FOllow up with Dr. Fernandes - nicki for repeat echo    Please be aware that coverage of these services is subject to the terms and limitations of your health insurance plan.  Call member services at your health plan with any benefit or coverage questions.      Please bring the following to your appointment:  Any x-rays, CTs or MRIs which have been performed. Contact the facility where they were done to arrange for  prior to your scheduled appointment.    List of current medications  This referral request   Any documents/labs given to you for this referral            GASTROENTEROLOGY ADULT REF PROCEDURE ONLY       Last Lab Result: Creatinine (mg/dL)       Date                     Value                 08/13/2018               1.07             ----------  Body mass index is 30.19 kg/(m^2).     Needed:  No  Language:  English    Patient will be contacted to schedule procedure.     Please be aware that coverage of these services is subject to the terms and limitations of your health insurance plan.  Call member services at your health plan with any benefit or coverage questions.  Any procedures must be performed  at a [facility] OR coordinated by your clinic's referral office.    Please bring the following with you to your appointment:    (1) Any X-Rays, CTs or MRIs which have been performed.  Contact the facility where they were done to arrange for  prior to your scheduled appointment.    (2) List of current medications   (3) This referral request   (4) Any documents/labs given to you for this referral                  Your next 10 appointments already scheduled     Feb 18, 2019  9:10 AM CST   LAB with GH LAB   [facility]    [address] [facility] [address]   [phone]           Please do not eat 10-12 hours before your appointment if you are coming in fasting for labs on lipids, cholesterol, or glucose (sugar). This does not apply to pregnant women. Water, hot tea and black coffee (with nothing added) are okay. Do not drink other fluids, diet soda or chew gum.            Feb 25, 2019  9:30 AM CST   Return Visit with Romel Ray MD   [facility]    [address] [facility] [address]   [phone]              Who to contact     If you have questions or need follow up information about today's clinic visit or your schedule please contact [facility] directly at [phone].  Normal or non-critical lab and imaging results will be communicated to you by MyChart, letter or phone within 4 business days after the clinic has received the results. If you do not hear from us within 7 days, please contact the clinic through MyChart or phone. If you have a critical or abnormal lab result, we will notify you by phone as soon as possible.  Submit refill requests through [employer] or call your pharmacy and they will forward the refill request to us. Please allow 3 business days for your refill to be completed.          Additional Information About Your Visit         [employer] Information     [employer] gives you secure access to your electronic health record. If you see a primary care provider, you can also send messages to your care team and make appointments. If you have questions, please call your primary care clinic.  If you do not have a primary care provider, please call [phone] and they will assist you.        Care EveryWhere ID     This is your Care EveryWhere ID. This could be used by other organizations to access your [facility] medical records  [id number]        Your Vitals Were     Pulse Temperature Respirations BMI (Body Mass Index)          68 97.6  F (36.4  C) (Tympanic) 18 30.19 kg/m2         Blood Pressure from Last 3 Encounters:   09/05/18 132/74   08/24/18 130/78   08/13/18 (!) 160/98    Weight from Last 3 Encounters:   09/05/18 210 lb 6.4 oz (95.4 kg)   08/24/18 207 lb 6.4 oz (94.1 kg)   08/13/18 206 lb (93.4 kg)              We Performed the Following     CARDIOLOGY EVAL ADULT REFERRAL     EKG 12-LEAD CLINIC READ     GASTROENTEROLOGY ADULT REF PROCEDURE ONLY        Primary Care Provider Office Phone # Fax #    Magdaleno Palmer [phone] [address] [facility] [address]        Equal Access to Services     DAISY INGRAM AH: Hadii aad ku hadasho Soomaali, waaxda luqadaha, qaybta kaalmada adeegyada, waxay idiin hayrajeshn terry blanco . So [facility] [phone].    ATENCIÓN: Si habla español, tiene a mejia disposición servicios gratTrac Emc & Safetyos de asistencia lingüística. Llame al [phone].    We comply with applicable federal civil rights laws and [place] laws. We do not discriminate on the basis of race, color, national origin, age, disability, sex, sexual orientation, or gender identity.            Thank you!     Thank you for choosing [facility] AND [facility]  for your care. Our goal is always to provide you with excellent care. Hearing back from our patients is one way we can continue to improve our services.  Please take a few minutes to complete the written survey that you may receive in the mail after your visit with us. Thank you!             Your Updated Medication List - Protect others around you: Learn how to safely use, store and throw away your medicines at www.disposemymeds.org.          This list is accurate as of 9/5/18 11:59 PM.  Always use your most recent med list.                   Brand Name Dispense Instructions for use Diagnosis    aspirin 81 MG EC tablet      Take 81 mg by mouth        losartan 100 MG tablet    COZAAR    90 tablet    Take 1 tablet (100 mg) by mouth daily    Benign essential hypertension       metoprolol succinate 50 MG 24 hr tablet    TOPROL-XL    90 tablet    Take 1 tablet (50 mg) by mouth daily    Benign essential hypertension       nitroGLYcerin 0.4 MG sublingual tablet    NITROSTAT     Place 1 tab under the tongue every 5 mins if needed for Chest Pain (up to 3 doses). DON'T USE VIAGRA/CIALIS/LEVITRA WITHIN 48 HRS OF NITRO        rosuvastatin 20 MG tablet    CRESTOR    90 tablet    Take 1 tablet (20 mg) by mouth At Bedtime    Coronary artery disease due to lipid rich plaque, Mixed hyperlipidemia       tolterodine 2 MG tablet    DETROL    180 tablet    Take 1 tablet (2 mg) by mouth 2 times daily    Urinary urgency

## 2018-09-05 NOTE — Clinical Note
Praveen is doing well, I noticed he is due for a repeat echo and follow up with you.  I want him to get a colonoscopy which it looks like is scheduled for 9-21 but can be pushed back if it is difficult to get him in to see you before then.  I did an EKG that looks like it wasn't sent to HIM properly by nursing which I will rectify tomorrow.  Call with any questions, thanks.

## 2018-09-05 NOTE — NURSING NOTE
"Patient presents in the clinic for an annual check up, and for medication management.  Verona Christensen LPN 9/5/2018 9:48 AM  Chief Complaint   Patient presents with     Medication Therapy Management       Initial /78 (BP Location: Right arm, Patient Position: Sitting, Cuff Size: Adult Large)  Pulse 68  Temp 97.6  F (36.4  C) (Tympanic)  Resp 18  Wt 210 lb 6.4 oz (95.4 kg)  BMI 30.19 kg/m2 Estimated body mass index is 30.19 kg/(m^2) as calculated from the following:    Height as of 8/13/18: 5' 10\" (1.778 m).    Weight as of this encounter: 210 lb 6.4 oz (95.4 kg).  Medication Reconciliation: complete    Bethany Christensen LPN    "

## 2018-09-06 DIAGNOSIS — Z12.11 ENCOUNTER FOR SCREENING COLONOSCOPY: Primary | ICD-10-CM

## 2018-09-06 RX ORDER — BISACODYL 5 MG/1
TABLET, DELAYED RELEASE ORAL
Qty: 2 TABLET | Refills: 0 | Status: ON HOLD | OUTPATIENT
Start: 2018-09-06 | End: 2018-09-21

## 2018-09-06 RX ORDER — POLYETHYLENE GLYCOL 3350, SODIUM CHLORIDE, SODIUM BICARBONATE, POTASSIUM CHLORIDE 420; 11.2; 5.72; 1.48 G/4L; G/4L; G/4L; G/4L
4000 POWDER, FOR SOLUTION ORAL ONCE
Qty: 4000 ML | Refills: 0 | Status: SHIPPED | OUTPATIENT
Start: 2018-09-06 | End: 2018-09-06

## 2018-09-06 ASSESSMENT — PATIENT HEALTH QUESTIONNAIRE - PHQ9: SUM OF ALL RESPONSES TO PHQ QUESTIONS 1-9: 0

## 2018-09-06 NOTE — TELEPHONE ENCOUNTER
Screening Questions for the Scheduling of Screening Colonoscopies   (If Colonoscopy is diagnostic, Provider should review the chart before scheduling.)  Are you younger than 50 or older than 80?  NO    Do you take aspirin or fish oil?    ASPIRIN     (if yes, tell patient to stop 1 week prior to Colonoscopy)  Do you take warfarin (Coumadin), clopidogrel (Plavix), apixaban (Eliquis), dabigatram (Pradaxa), rivaroxaban (Xarelto) or any blood thinner?   NO   Do you use oxygen at home?    NO   Do you have kidney disease?   NO   Are you on dialysis? NO  Have you had a stroke or heart attack in the last year?   NO   Have you had a stent in your heart or any blood vessel in the last year?  NO  Have you had a transplant of any organ?   NO   Have you had a colonoscopy or upper endoscopy (EGD) before?   YES           When?    OVER 10 YRS ?    [facility]     Date of scheduled Colonoscopy.   09/21/2018  Provider   LISA   Pharmacy   [facility]

## 2018-09-13 VITALS
HEART RATE: 68 BPM | BODY MASS INDEX: 30.19 KG/M2 | WEIGHT: 210.4 LBS | SYSTOLIC BLOOD PRESSURE: 132 MMHG | TEMPERATURE: 97.6 F | DIASTOLIC BLOOD PRESSURE: 74 MMHG | RESPIRATION RATE: 18 BRPM

## 2018-09-14 DIAGNOSIS — I35.0 AORTIC STENOSIS, MILD: ICD-10-CM

## 2018-09-14 DIAGNOSIS — I25.10 CORONARY ARTERY DISEASE INVOLVING NATIVE CORONARY ARTERY OF NATIVE HEART WITHOUT ANGINA PECTORIS: Primary | ICD-10-CM

## 2018-09-14 DIAGNOSIS — Q23.81 BICUSPID AORTIC VALVE: ICD-10-CM

## 2018-09-14 DIAGNOSIS — I71.21 ASCENDING AORTIC ANEURYSM (H): ICD-10-CM

## 2018-09-14 NOTE — PROGRESS NOTES
"Nursing Notes:   Bethany Christensen LPN  9/5/2018 10:08 AM  Signed  Patient presents in the clinic for an annual check up, and for medication management.  Verona Christensen LPN 9/5/2018 9:48 AM  Chief Complaint   Patient presents with     Medication Therapy Management       Initial /78 (BP Location: Right arm, Patient Position: Sitting, Cuff Size: Adult Large)  Pulse 68  Temp 97.6  F (36.4  C) (Tympanic)  Resp 18  Wt 210 lb 6.4 oz (95.4 kg)  BMI 30.19 kg/m2 Estimated body mass index is 30.19 kg/(m^2) as calculated from the following:    Height as of 8/13/18: 5' 10\" (1.778 m).    Weight as of this encounter: 210 lb 6.4 oz (95.4 kg).  Medication Reconciliation: complete    Bethany Christensen LPN        SUBJECTIVE:  Praveen Lovell is a 73 year old male who comes in today to follow up on HTN, ASCVD, and BPH.  He feels well and has no concerns.  He is due for a refill of medications.  He is taking the losartan and metoprolol for HTN with no side effects.  He has not had any episodes of chest pain, shortness of breath or other anginal equivalents.  He has occasional acid reflux but this is rare, occurs 3-4 times per month and resolves with OTC zanatac.  No exertional component.  Had some abdominal pain a month ago, but none since.  Reports it was in LRQ and felt like aching colicky pain.  Has not recurred.  No black or bloody stools, no other changes in bowel habits.  He has BPH and does take detrol for urinary urgency, which seems to be working. He has not had any other urinary concerns.      He is also on ASA 81mg daily and crestor without side effect.      He did fall 2d ago and scraped his forehead.  Reports it was mechanical in nature and he did not have any LOC nor has he had any subsequent symptoms concerning for concussion or bleed.    He thinks he had a colonoscopy about 10y ago but we do not have record of that, he has been receiving care locally for 5y and has not had colon cancer screening in " that time.      PHQ-9  PHQ-9 SCORE 7/25/2018 9/5/2018   Total Score 0 0         Current Outpatient Prescriptions   Medication Sig Dispense Refill     aspirin EC 81 MG EC tablet Take 81 mg by mouth       losartan (COZAAR) 100 MG tablet Take 1 tablet (100 mg) by mouth daily 90 tablet 3     metoprolol succinate (TOPROL-XL) 50 MG 24 hr tablet Take 1 tablet (50 mg) by mouth daily 90 tablet 3     nitroGLYcerin (NITROSTAT) 0.4 MG sublingual tablet Place 1 tab under the tongue every 5 mins if needed for Chest Pain (up to 3 doses). DON'T USE VIAGRA/CIALIS/LEVITRA WITHIN 48 HRS OF NITRO       rosuvastatin (CRESTOR) 20 MG tablet Take 1 tablet (20 mg) by mouth At Bedtime 90 tablet 3     tolterodine (DETROL) 2 MG tablet Take 1 tablet (2 mg) by mouth 2 times daily 180 tablet 3     bisacodyl (DULCOLAX) 5 MG EC tablet Take as directed by colonoscopy prep instructions 2 tablet 0     Past Surgical History:   Procedure Laterality Date     OTHER SURGICAL HISTORY      ,HERNIA REPAIR     OTHER SURGICAL HISTORY      12/2/14,205446,CVL CORONARY ANGIOGRAM POSS PCI,Cor angio: 90% prox LAD; s/p ZAYNAB x 1 to prox LAD with PTCA of diagonal 1 that was jailed due to plaque shifting, EF 65%         Social History     Social History     Marital status:      Spouse name: N/A     Number of children: N/A     Years of education: N/A     Occupational History     Not on file.     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  Quit smoking around 1995.    Drinks once or twice a week per year.  .  Three  daughters.  6 grandkids.  One daughter in [place] -  to Dr. Rodriguez.  Two daughters and families in [place].       I have personally reviewed and updated above noted social, family and/or past medical history.    ROS  CONSTITUTIONAL: NEGATIVE for fever, chills, change in weight  ENT/MOUTH: NEGATIVE for ear, mouth and throat problems  RESP: NEGATIVE for significant cough or SOB  CV: NEGATIVE for chest pain, palpitations or peripheral edema  GI: NEGATIVE for nausea, abdominal pain, or change in bowel habits.  See HPI regarding acid reflux  : See HPI  MUSCULOSKELETAL: NEGATIVE for significant arthralgias or myalgia  NEURO: NEGATIVE for weakness, dizziness or paresthesias  PSYCHIATRIC: NEGATIVE for changes in mood or affect      /74  Pulse 68  Temp 97.6  F (36.4  C) (Tympanic)  Resp 18  Wt 210 lb 6.4 oz (95.4 kg)  BMI 30.19 kg/m2    Exam:  GENERAL: healthy, alert and no distress  EYES: Eyes grossly normal to inspection, PERRL and conjunctivae and sclerae normal  HENT: ear canals and TM's normal, nose and mouth without ulcers or lesions  NECK: no adenopathy, no asymmetry, masses, or scars and thyroid normal to palpation  RESP: lungs clear to auscultation - no rales, rhonchi or wheezes  CV: regular rate and rhythm, 1/6 SIN @ LUSB, no S3/S4, no click or rub, no peripheral edema and peripheral pulses strong  ABDOMEN: soft, nontender, no hepatosplenomegaly, no masses and bowel sounds normal  MS: no gross musculoskeletal defects noted, no edema  NEURO: Normal strength and tone, mentation intact and speech normal  SKIN:  Small abrasion on forehead.  NO concerning lesions.  PSYCH: mentation appears normal, affect normal/bright  LYMPH: no cervical, supraclavicular, axillary, or inguinal adenopathy      ASSESSMENT/Plan :    I personally reviewed the patients' labs and imaging.    Results for orders placed or performed during the hospital encounter of 08/13/18   XR Abdomen 2 Views    Narrative    PROCEDURE:  XR  ABDOMEN 2 VW    HISTORY:  pain; .     TECHNIQUE:  Upright and supine views of the abdomen were obtained.    COMPARISON:  None.    FINDINGS:     There is a nonobstructive bowel gas pattern. No free air is seen. No  abnormal calcifications are evident.      Impression    IMPRESSION:    No obstruction or free air.    ALTAF ELDRIDGE MD   *UA reflex to Microscopic   Result Value Ref Range    Color Urine Yellow     Appearance Urine Clear     Glucose Urine Negative NEG^Negative mg/dL    Bilirubin Urine Negative NEG^Negative    Ketones Urine Negative NEG^Negative mg/dL    Specific Gravity Urine >1.030 (H) 1.000 - 1.030    Blood Urine Negative NEG^Negative    pH Urine 6.0 5.0 - 9.0 pH    Protein Albumin Urine 30 (A) NEG^Negative mg/dL    Urobilinogen Urine 0.2 0.2 - 1.0 EU/dL    Nitrite Urine Negative NEG^Negative    Leukocyte Esterase Urine Negative NEG^Negative    Source Unspecified Urine    CBC with platelets differential   Result Value Ref Range    WBC 8.4 4.0 - 11.0 10e9/L    RBC Count 4.45 4.4 - 5.9 10e12/L    Hemoglobin 14.3 13.3 - 17.7 g/dL    Hematocrit 41.2 40.0 - 53.0 %    MCV 93 78 - 100 fl    MCH 32.1 26.5 - 33.0 pg    MCHC 34.7 31.5 - 36.5 g/dL    RDW 12.6 10.0 - 15.0 %    Platelet Count 184 150 - 450 10e9/L    Diff Method Automated Method     % Neutrophils 77.8 %    % Lymphocytes 12.0 %    % Monocytes 6.8 %    % Eosinophils 2.7 %    % Basophils 0.6 %    % Immature Granulocytes 0.1 %    Absolute Neutrophil 6.5 1.6 - 8.3 10e9/L    Absolute Lymphocytes 1.0 0.8 - 5.3 10e9/L    Absolute Monocytes 0.6 0.0 - 1.3 10e9/L    Absolute Eosinophils 0.2 0.0 - 0.7 10e9/L    Absolute Basophils 0.1 0.0 - 0.2 10e9/L    Abs Immature Granulocytes 0.0 0 - 0.4 10e9/L   Comprehensive metabolic panel   Result Value Ref Range    Sodium 139 134 - 144 mmol/L    Potassium 4.2 3.5 - 5.1 mmol/L    Chloride 105 98 - 107 mmol/L    Carbon Dioxide 27 21 - 31 mmol/L    Anion Gap 7 3 - 14 mmol/L    Glucose 120 (H) 70 - 105 mg/dL    Urea Nitrogen  22 7 - 25 mg/dL    Creatinine 1.07 0.70 - 1.30 mg/dL    GFR Estimate 67 >60 mL/min/1.7m2    GFR Estimate If Black 82 >60 mL/min/1.7m2    Calcium 9.4 8.6 - 10.3 mg/dL    Bilirubin Total 0.6 0.3 - 1.0 mg/dL    Albumin 4.3 3.5 - 5.7 g/dL    Protein Total 7.1 6.4 - 8.9 g/dL    Alkaline Phosphatase 100 34 - 104 U/L    ALT 20 7 - 52 U/L    AST 17 13 - 39 U/L   Urine Microscopic   Result Value Ref Range    WBC Urine 0 - 5 OTO5^0 - 5 /HPF    RBC Urine O - 2 OTO2^O - 2 /HPF       No images are attached to the encounter or orders placed in the encounter.      1. Special screening for malignant neoplasms, colon  I suspect his abdominal pain was gas pain, with history of appendectomy could have intermittent bowel obstruction but this was not seen at the time of evaluation.  I discussed with him in detail common symptoms of this.  Hgb was normal with no microcytosis.  He may be overdue for colonoscopy and would suggest this be repeated.  - GASTROENTEROLOGY ADULT REF PROCEDURE ONLY    2. Benign essential hypertension  Recent labs were normal.  BP is near goal with recheck BP of 132/74.  Nonfasting glucose elevated.  Had normal fasting blood glucose on 2-23-18    3. Coronary artery disease involving native coronary artery of native heart without angina pectoris  Patient reports he can hike 2 miles without any cardiopulmonary symptoms.  He also does TRX exercises here at [facility] without issues.  I do not note significant change in EKG from previous, due for follow up echo with cardiology for moderate aortic stenosis.  Suggest visit prior to colonoscopy.  - EKG 12-LEAD CLINIC READ    4. Benign nodular prostatic hyperplasia with lower urinary tract symptoms   Symptoms stable, see below.    5.  Low grade prostate cancer, utilizing active surveillance  Recently evaluated by Dr. Ray.  Continue with surveillance.            There are no Patient Instructions on file for this visit.    Magdaleno Palmer    This document was created using  computer generated templates and voiceactivated software.

## 2018-09-17 ENCOUNTER — TELEPHONE (OUTPATIENT)
Dept: INTERNAL MEDICINE | Facility: OTHER | Age: 73
End: 2018-09-17

## 2018-09-17 NOTE — TELEPHONE ENCOUNTER
Pt med profile has xaralto but Praveen does not recognize this name and is not taking it Please look at and see if it can come off the list. Wife jyoti also indicated he is not taking this. I was calling him to prep him for his colonoscopy

## 2018-09-18 ENCOUNTER — HOSPITAL ENCOUNTER (OUTPATIENT)
Dept: CARDIOLOGY | Facility: OTHER | Age: 73
Discharge: HOME | End: 2018-09-18
Attending: INTERNAL MEDICINE | Admitting: INTERNAL MEDICINE
Payer: MEDICARE

## 2018-09-18 DIAGNOSIS — I71.21 ASCENDING AORTIC ANEURYSM (H): ICD-10-CM

## 2018-09-18 DIAGNOSIS — I10 BENIGN ESSENTIAL HYPERTENSION: ICD-10-CM

## 2018-09-18 DIAGNOSIS — Q23.81 BICUSPID AORTIC VALVE: ICD-10-CM

## 2018-09-18 DIAGNOSIS — I35.0 AORTIC STENOSIS, MILD: ICD-10-CM

## 2018-09-18 DIAGNOSIS — I25.10 CORONARY ARTERY DISEASE INVOLVING NATIVE CORONARY ARTERY OF NATIVE HEART WITHOUT ANGINA PECTORIS: ICD-10-CM

## 2018-09-18 DIAGNOSIS — I42.2 HYPERTROPHIC CARDIOMYOPATHY (H): Primary | ICD-10-CM

## 2018-09-18 PROCEDURE — 93306 TTE W/DOPPLER COMPLETE: CPT

## 2018-09-18 PROCEDURE — 93306 TTE W/DOPPLER COMPLETE: CPT | Mod: 26 | Performed by: INTERNAL MEDICINE

## 2018-09-20 ENCOUNTER — OFFICE VISIT (OUTPATIENT)
Dept: CARDIOLOGY | Facility: OTHER | Age: 73
End: 2018-09-20
Attending: INTERNAL MEDICINE
Payer: COMMERCIAL

## 2018-09-20 VITALS
SYSTOLIC BLOOD PRESSURE: 138 MMHG | DIASTOLIC BLOOD PRESSURE: 80 MMHG | HEART RATE: 56 BPM | BODY MASS INDEX: 30.71 KG/M2 | WEIGHT: 214 LBS

## 2018-09-20 DIAGNOSIS — I42.2 HYPERTROPHIC CARDIOMYOPATHY (H): ICD-10-CM

## 2018-09-20 DIAGNOSIS — I71.21 ASCENDING AORTIC ANEURYSM (H): ICD-10-CM

## 2018-09-20 DIAGNOSIS — I10 BENIGN ESSENTIAL HYPERTENSION: ICD-10-CM

## 2018-09-20 DIAGNOSIS — Z98.890 HISTORY OF CARDIAC CATHETERIZATION: ICD-10-CM

## 2018-09-20 DIAGNOSIS — Q23.81 BICUSPID AORTIC VALVE: ICD-10-CM

## 2018-09-20 DIAGNOSIS — I51.7 ASYMMETRIC SEPTAL HYPERTROPHY: ICD-10-CM

## 2018-09-20 DIAGNOSIS — E78.5 DYSLIPIDEMIA: ICD-10-CM

## 2018-09-20 DIAGNOSIS — Z95.5 H/O HEART ARTERY STENT: ICD-10-CM

## 2018-09-20 DIAGNOSIS — I35.0 MODERATE AORTIC STENOSIS: Primary | ICD-10-CM

## 2018-09-20 DIAGNOSIS — E66.9 OBESITY, UNSPECIFIED CLASSIFICATION, UNSPECIFIED OBESITY TYPE, UNSPECIFIED WHETHER SERIOUS COMORBIDITY PRESENT: ICD-10-CM

## 2018-09-20 PROCEDURE — G0463 HOSPITAL OUTPT CLINIC VISIT: HCPCS

## 2018-09-20 PROCEDURE — 99215 OFFICE O/P EST HI 40 MIN: CPT | Performed by: INTERNAL MEDICINE

## 2018-09-20 ASSESSMENT — PAIN SCALES - GENERAL: PAINLEVEL: NO PAIN (0)

## 2018-09-20 NOTE — PROGRESS NOTES
Cardiology Progress Note     Assessment & Plan   Praveen Lovell is a 73 year old male who is being seen in follow-up to visit from 2/1/18.  He intends to have a colonoscopy tomorrow, 9/21/18.  He was seen for surgical risk stratification by his primary on 9/5/18 and had his scheduled 6 month follow-up of his echocardiogram with findings of worsening of his aortic valve stenosis. Also, he was noted to have a mild ascending aortic aneurysm at 4.1 cm.  His aortic valve was moderately stenosed.  He was also noted to have some thickening of the anterior basal septum.  He was referred back prior to having his colonoscopy.  He remains asymptomatic and has no specific complaints today.    Impression:  1.  Moderate aortic stenosis secondary to bicuspid aortic valve as noted on his echocardiogram from 9/18/18.  2.  Bicuspid aortic valve.  3.  Ascending aortic aneurysm at 4.1 cm on 9/18/18.  4.  Asymmetric hypertrophy of the septum.  5.  History of cardiac catheterization on 12/22/14 with stenting to his LAD.  6.  Hyperlipidemia.  7.  Obesity.  8.  Hypertension.    Plan:  1.  We discussed his echo findings. His echo findings do not preclude him from having the colonoscopy. He should proceed with a colonoscopy.  2.  We discussed his echo from 8/18/18 which shows a stable ejection fraction of 55-60%, thickening of the anterior basal septum, bicuspid aortic valve, moderate aortic stenosis, and an ascending aortic aneurysm 4.1 cm.  3.  In light of his echo findings, it was suggested he have a repeat echo in approximately 1 year.  4.  We discussed various options for aortic valve replacement. These are narrowed down to TAVR versus cardiothoracic surgery with a mechanical/bioprosthetic valve replacement.  For now, we will continue to follow this valve but he is aware that in the future, he may need to have this replaced.  5.  He was told that first-degree relatives which includes brothers and sisters as well as children  need to have an echo to assess for this genetic finding.  6.  His medications remain unchanged. Continue aspirin 81 mg daily, losartan 100 mg daily, Toprol 50 mg daily, sublingual nitro as needed chest pain, and Crestor 20 mg daily.  7.  He will be seen in approximately 1 year follow-up with an echo completed prior.        David Fernandes    Interval History   Praveen is being seen for an approximately 6 month follow-up.  He is planned for colonoscopy on 9/21/18. He had a repeat 6 month echocardiogram. This showed stability of his aortic stenosis, bicuspid aortic valve, and a mild ascending aortic aneurysm.  Previously, on his echo from 2/23/18, he was found to have moderate aortic stenosis.  On his more recent echo from 9/18/18, it also showed moderate aortic stenosis. He is aware that first-degree relatives need to have an echocardiogram as this is a genetic condition.  We discussed the correlation of an ascending aortic aneurysm in conjunction with bicuspid aortic valve.  He is aware that if there is progression in the dilation of his aorta, this may require surgery in the future.  Using Oxtox with images, we discussed cardiothoracic surgery with replacement of the valve versus TAVR.  We discussed symptoms which would include shortness of breath/heart failure, chest pain, and syncope.  He is describing some atypical chest pain with working out.  He has been aggressive with exercise all his life.  He describes more of a musculoskeletal discomfort, made worse with pain.    He does have history of coronary artery disease with stent placement to his LAD on 12/20/14.  He is not having concerns for angina.  He denies dizziness, lightheadedness, near syncope, or syncope.  He has not had any significant swelling to his lower extremities.  He has no additional concerns or complaints.    HPI:  He had last seen Dr. Antoien on 2/15/17. At that time, it was recommended he have a yearly follow-up secondary to coronary artery  disease and stent placement. He had a stress test on 11/25/14 that showed concerns for wall motion abnormalities. He went on to have an angiogram which showed a 90% stenosis to his LAD. He is status post resolute drug-eluting stent 3.5 mm x 15 mm to his LAD. Following the procedure a he developed a rash. It was uncertain if his rash was secondary to contrast or secondary to Plavix. He was switched to prasugrel as a result. With these changes, he did not have a recurrence of his rash. He reports cross-country skiing on a regular basis up to 45 minutes and at 3 miles on a regular basis. He does not have symptoms with this. Also, he does walk on a treadmill and he is asymptomatic.     Previously, he had a stress echo on 11/25/14. He was noted have wall motion abnormalities. In addition, he was noted to have mild mitral and tricuspid regurgitation.  There were concerns for a bicuspid aortic valve, mild aortic stenosis, and concerns for an ascending aortic aneurysm.  It was suggested that he should have his 3 daughters checked for this and his brothers and sisters. He is to have his first-degree relatives evaluated. He is aware.     He is also being treated for hypertension. He is currently on Norvasc 2.5 mg daily and losartan 100 mg daily.     Also, he has a history of hyperlipidemia. He is currently on Crestor 20 mg daily. His last cholesterol was on 3/31/17 which showed total cholesterol of 102, LDL of 33, triglycerides of 84, and a HDL of 49.    Physical Exam       BP: 138/80 Pulse: 56            Vitals:    09/20/18 1254   Weight: 97.1 kg (214 lb)     Vital Signs with Ranges  Pulse:  [56] 56  BP: (138)/(80) 138/80  ROS negative except that which is known the HPI.         Constitutional: awake, alert, cooperative, no apparent distress, and appears stated age  Eyes: Lids and lashes normal, pupils equal, sclera clear, conjunctiva normal  ENT: Normocephalic, without obvious abnormality, atraumatic.  Respiratory: No  increased work of breathing, good air exchange, clear to auscultation bilaterally, no crackles or wheezing  Cardiovascular: Normal apical impulse, regular rate and rhythm, normal S1 and S2, no S3 or S4, and no murmur noted  Musculoskeletal: no lower extremity pitting edema present  Neurologic: Awake, alert, oriented to name, place and time.    Neuropsychiatric: General: normal, calm and normal eye contact    Medications         Data   No results found for this or any previous visit (from the past 24 hour(s)).

## 2018-09-20 NOTE — NURSING NOTE
"Patient comes in for follow up on echo.  Jennifer Austin LPN ....................9/20/2018   1:02 PM  Chief Complaint   Patient presents with     Follow Up For     after echo       Initial /80 (BP Location: Right arm, Patient Position: Sitting, Cuff Size: Adult Large)  Pulse 56  Wt 97.1 kg (214 lb)  BMI 30.71 kg/m2 Estimated body mass index is 30.71 kg/(m^2) as calculated from the following:    Height as of 8/13/18: 1.778 m (5' 10\").    Weight as of this encounter: 97.1 kg (214 lb).  Meds Reconciled: complete  Pt is on Aspirin  Pt is on a Statin  PHQ and/or MIKEY reviewed. Pt referred to PCP/MH Provider as appropriate.    Jennifer Austin LPN      "

## 2018-09-20 NOTE — PATIENT INSTRUCTIONS
You were seen by  David Fernandes DO      1.  Cleared for colonoscopy     2. Echocardiogram in 1 year    You will follow up with  David Fernandes DO  in 1 year.       Please call the cardiology office with problems, questions, or concerns at [phone].    If you experience chest pain, chest pressure, chest tightness, shortness of breath, fainting, lightheadedness, nausea, vomiting, or other concerning symptoms, please report to the Emergency Department or call 911. These symptoms may be emergent, and best treated in the Emergency Department.     DAT LiN, RN  [facility]  [phone]

## 2018-09-20 NOTE — MR AVS SNAPSHOT
After Visit Summary   9/20/2018    Praveen Lovell    MRN: [id number]           Patient Information     Date Of Birth          1945        Visit Information        Provider Department      9/20/2018 12:45 PM David Fernandes DO [facility]        Today's Diagnoses     Coronary artery disease involving native coronary artery of native heart without angina pectoris        Nonrheumatic aortic valve stenosis          Care Instructions    You were seen by  David Fernandes DO      1.  Cleared for colonoscopy     2. Echocardiogram in 1 year    You will follow up with  David Fernandes DO  in 1 year.       Please call the cardiology office with problems, questions, or concerns at [phone].    If you experience chest pain, chest pressure, chest tightness, shortness of breath, fainting, lightheadedness, nausea, vomiting, or other concerning symptoms, please report to the Emergency Department or call 911. These symptoms may be emergent, and best treated in the Emergency Department.     DAT LiN, RN  [facility]  [phone]             Follow-ups after your visit        Your next 10 appointments already scheduled     Sep 21, 2018   [facility]    [address]  [place] [id number] [phone]            Feb 18, 2019  9:10 AM CST   [facility]   [facility]    [address] [facility] [id number] [phone]           Please do not eat 10-12 hours before your appointment if you are coming in fasting for labs on lipids, cholesterol, or glucose (sugar). This does not apply to pregnant women. Water, hot tea and black coffee (with nothing added) are okay. Do not drink other fluids, diet soda or chew gum.            Feb 25, 2019  9:30 AM CST   Return Visit with Romel Ray MD    [facility]    [address]  [place] [address] [phone]              Who to contact     If you have questions or need follow up information about today's clinic visit or your schedule please contact [facility] directly at [phone].  Normal or non-critical lab and imaging results will be communicated to you by MyChart, letter or phone within 4 business days after the clinic has received the results. If you do not hear from us within 7 days, please contact the clinic through [employer] or phone. If you have a critical or abnormal lab result, we will notify you by phone as soon as possible.  Submit refill requests through [employer] or call your pharmacy and they will forward the refill request to us. Please allow 3 business days for your refill to be completed.          Additional Information About Your Visit        [facility]anz[employer]REEC[facility] Information     [employer] gives you secure access to your electronic health record. If you see a primary care provider, you can also send messages to your care team and make appointments. If you have questions, please call your primary care clinic.  If you do not have a primary care provider, please call [phone] and they will assist you.        Care EveryWhere ID     This is your Care EveryWhere ID. This could be used by other organizations to access your [facility] medical records  [id number]        Your Vitals Were     Pulse BMI (Body Mass Index)                56 30.71 kg/m2           Blood Pressure from Last 3 Encounters:   09/20/18 138/80   09/05/18 132/74   08/24/18 130/78    Weight from Last 3 Encounters:   09/20/18 97.1 kg (214 lb)   09/05/18 95.4 kg (210 lb 6.4 oz)   08/24/18 94.1 kg (207 lb 6.4 oz)              Today, you had the following     No orders found for display       Primary Care Provider Office Phone # Fax #    Magdaleno Palmer [phone]       [address]   [facility] [address]        Equal Access to Services     DAISY INGRAM : Hadii aad ku hadindigomadina Light, wagiuliada fernando, qageorges tejeda. So [facility] [phone].    ATENCIÓN: Si habla español, tiene a mejia disposición servicios gratuitos de asistencia lingüística. Llame al [phone].    We comply with applicable federal civil rights laws and [place] laws. We do not discriminate on the basis of race, color, national origin, age, disability, sex, sexual orientation, or gender identity.            Thank you!     Thank you for choosing [facility] AND [facility]  for your care. Our goal is always to provide you with excellent care. Hearing back from our patients is one way we can continue to improve our services. Please take a few minutes to complete the written survey that you may receive in the mail after your visit with us. Thank you!             Your Updated Medication List - Protect others around you: Learn how to safely use, store and throw away your medicines at www.disposemymeds.org.          This list is accurate as of 9/20/18  1:33 PM.  Always use your most recent med list.                   Brand Name Dispense Instructions for use Diagnosis    aspirin 81 MG EC tablet      Take 81 mg by mouth        bisacodyl 5 MG EC tablet    DULCOLAX    2 tablet    Take as directed by colonoscopy prep instructions    Encounter for screening colonoscopy       losartan 100 MG tablet    COZAAR    90 tablet    Take 1 tablet (100 mg) by mouth daily    Benign essential hypertension       metoprolol succinate 50 MG 24 hr tablet    TOPROL-XL    90 tablet    Take 1 tablet (50 mg) by mouth daily    Benign essential hypertension       nitroGLYcerin 0.4 MG sublingual tablet    NITROSTAT     Place 1 tab under the tongue every 5 mins if needed for Chest Pain (up to 3 doses). DON'T USE VIAGRA/CIALIS/LEVITRA WITHIN 48 HRS OF NITRO        rosuvastatin 20 MG tablet     CRESTOR    90 tablet    Take 1 tablet (20 mg) by mouth At Bedtime    Coronary artery disease due to lipid rich plaque, Mixed hyperlipidemia       tolterodine 2 MG tablet    DETROL    180 tablet    Take 1 tablet (2 mg) by mouth 2 times daily    Urinary urgency

## 2018-09-21 ENCOUNTER — HOSPITAL ENCOUNTER (OUTPATIENT)
Facility: OTHER | Age: 73
Discharge: HOME | End: 2018-09-21
Attending: SURGERY | Admitting: SURGERY
Payer: MEDICARE

## 2018-09-21 ENCOUNTER — SURGERY (OUTPATIENT)
Age: 73
End: 2018-09-21

## 2018-09-21 ENCOUNTER — TELEPHONE (OUTPATIENT)
Dept: FAMILY MEDICINE | Facility: OTHER | Age: 73
End: 2018-09-21

## 2018-09-21 ENCOUNTER — ANESTHESIA (OUTPATIENT)
Dept: SURGERY | Facility: OTHER | Age: 73
End: 2018-09-21
Payer: MEDICARE

## 2018-09-21 ENCOUNTER — ANESTHESIA EVENT (OUTPATIENT)
Dept: SURGERY | Facility: OTHER | Age: 73
End: 2018-09-21
Payer: MEDICARE

## 2018-09-21 VITALS
BODY MASS INDEX: 30.27 KG/M2 | SYSTOLIC BLOOD PRESSURE: 126 MMHG | OXYGEN SATURATION: 96 % | TEMPERATURE: 98.4 F | DIASTOLIC BLOOD PRESSURE: 90 MMHG | WEIGHT: 204.4 LBS | RESPIRATION RATE: 14 BRPM | HEIGHT: 69 IN | HEART RATE: 54 BPM

## 2018-09-21 PROCEDURE — 25000125 ZZHC RX 250: Performed by: SURGERY

## 2018-09-21 PROCEDURE — 25000128 H RX IP 250 OP 636: Performed by: SURGERY

## 2018-09-21 PROCEDURE — 25000128 H RX IP 250 OP 636: Performed by: NURSE ANESTHETIST, CERTIFIED REGISTERED

## 2018-09-21 PROCEDURE — 25000125 ZZHC RX 250: Performed by: NURSE ANESTHETIST, CERTIFIED REGISTERED

## 2018-09-21 PROCEDURE — 88305 TISSUE EXAM BY PATHOLOGIST: CPT | Performed by: SURGERY

## 2018-09-21 PROCEDURE — 45385 COLONOSCOPY W/LESION REMOVAL: CPT | Mod: PT | Performed by: SURGERY

## 2018-09-21 PROCEDURE — 40000010 ZZH STATISTIC ANES STAT CODE-CRNA PER MINUTE: Performed by: SURGERY

## 2018-09-21 PROCEDURE — 45385 COLONOSCOPY W/LESION REMOVAL: CPT | Performed by: NURSE ANESTHETIST, CERTIFIED REGISTERED

## 2018-09-21 PROCEDURE — 99100 ANES PT EXTEME AGE<1 YR&>70: CPT | Performed by: NURSE ANESTHETIST, CERTIFIED REGISTERED

## 2018-09-21 PROCEDURE — 25000132 ZZH RX MED GY IP 250 OP 250 PS 637: Mod: GY | Performed by: SURGERY

## 2018-09-21 PROCEDURE — 45380 COLONOSCOPY AND BIOPSY: CPT | Performed by: SURGERY

## 2018-09-21 RX ORDER — SIMETHICONE
LIQUID (ML) MISCELLANEOUS PRN
Status: DISCONTINUED | OUTPATIENT
Start: 2018-09-21 | End: 2018-09-21 | Stop reason: HOSPADM

## 2018-09-21 RX ORDER — PROPOFOL 10 MG/ML
INJECTION, EMULSION INTRAVENOUS CONTINUOUS PRN
Status: DISCONTINUED | OUTPATIENT
Start: 2018-09-21 | End: 2018-09-21

## 2018-09-21 RX ORDER — ONDANSETRON 2 MG/ML
4 INJECTION INTRAMUSCULAR; INTRAVENOUS
Status: DISCONTINUED | OUTPATIENT
Start: 2018-09-21 | End: 2018-09-21 | Stop reason: HOSPADM

## 2018-09-21 RX ORDER — LIDOCAINE HYDROCHLORIDE 20 MG/ML
INJECTION, SOLUTION INFILTRATION; PERINEURAL PRN
Status: DISCONTINUED | OUTPATIENT
Start: 2018-09-21 | End: 2018-09-21

## 2018-09-21 RX ORDER — PROPOFOL 10 MG/ML
INJECTION, EMULSION INTRAVENOUS PRN
Status: DISCONTINUED | OUTPATIENT
Start: 2018-09-21 | End: 2018-09-21

## 2018-09-21 RX ORDER — SODIUM CHLORIDE, SODIUM LACTATE, POTASSIUM CHLORIDE, CALCIUM CHLORIDE 600; 310; 30; 20 MG/100ML; MG/100ML; MG/100ML; MG/100ML
INJECTION, SOLUTION INTRAVENOUS CONTINUOUS
Status: DISCONTINUED | OUTPATIENT
Start: 2018-09-21 | End: 2018-09-21 | Stop reason: HOSPADM

## 2018-09-21 RX ORDER — LIDOCAINE 40 MG/G
CREAM TOPICAL
Status: DISCONTINUED | OUTPATIENT
Start: 2018-09-21 | End: 2018-09-21 | Stop reason: HOSPADM

## 2018-09-21 RX ORDER — FLUMAZENIL 0.1 MG/ML
0.2 INJECTION, SOLUTION INTRAVENOUS
Status: DISCONTINUED | OUTPATIENT
Start: 2018-09-21 | End: 2018-09-21 | Stop reason: HOSPADM

## 2018-09-21 RX ORDER — NALOXONE HYDROCHLORIDE 0.4 MG/ML
.1-.4 INJECTION, SOLUTION INTRAMUSCULAR; INTRAVENOUS; SUBCUTANEOUS
Status: DISCONTINUED | OUTPATIENT
Start: 2018-09-21 | End: 2018-09-21 | Stop reason: HOSPADM

## 2018-09-21 RX ADMIN — WATER 200 ML: 1 IRRIGANT IRRIGATION at 12:20

## 2018-09-21 RX ADMIN — PROPOFOL 80 MG: 10 INJECTION, EMULSION INTRAVENOUS at 11:56

## 2018-09-21 RX ADMIN — LIDOCAINE HYDROCHLORIDE 60 MG: 20 INJECTION, SOLUTION INFILTRATION; PERINEURAL at 11:56

## 2018-09-21 RX ADMIN — Medication 1.5 ML: at 12:20

## 2018-09-21 RX ADMIN — SODIUM CHLORIDE, SODIUM LACTATE, POTASSIUM CHLORIDE, AND CALCIUM CHLORIDE: 600; 310; 30; 20 INJECTION, SOLUTION INTRAVENOUS at 09:30

## 2018-09-21 RX ADMIN — PROPOFOL 140 MCG/KG/MIN: 10 INJECTION, EMULSION INTRAVENOUS at 11:56

## 2018-09-21 ASSESSMENT — ENCOUNTER SYMPTOMS: DYSRHYTHMIAS: 1

## 2018-09-21 NOTE — OR NURSING
Patient and responsible adult given discharge instructions with no questions regarding instructions. Edd score 20/20. Pain level 0/10.  Discharged from unit via ambulatory. Patient discharged to home.

## 2018-09-21 NOTE — DISCHARGE INSTRUCTIONS
[facility]   Adult Discharge Orders & Instructions     For 12 hours after surgery    1. Get plenty of rest.  A responsible adult must stay with you for at least 12 hours after you leave the hospital.   2. Do not drive or use heavy equipment.  If you have weakness or tingling, don't drive or use heavy equipment until this feeling goes away.  3. Do not drink alcohol.  4. Avoid strenuous or risky activities.  Ask for help when climbing stairs.   5. You may feel lightheaded.  IF so, sit for a few minutes before standing.  Have someone help you get up.   6. If you have nausea (feel sick to your stomach): Drink only clear liquids such as apple juice, ginger ale, broth or 7-Up.  Rest may also help.  Be sure to drink enough fluids.  Move to a regular diet as you feel able.  7. You may have a slight fever. Call the doctor if your fever is over 101 F (38.3 C) (taken under the tongue) or lasts longer than 24 hours.  8. You may have a dry mouth, a sore throat, muscle aches or trouble sleeping.  These should go away after 24 hours.  9. Do not make important or legal decisions.   Call your doctor for any of the followin.  Signs of infection (fever, growing tenderness at the surgery site, a large amount of drainage or bleeding, severe pain, foul-smelling drainage, redness, swelling).    2. It has been over 8 to 10 hours since surgery and you are still not able to urinate (pass water).    3.  Headache for over 24 hours.    4.  Numbness, tingling or weakness the day after surgery (if you had spinal anesthesia).  To contact a doctor, call _________[phone]_______________________

## 2018-09-21 NOTE — OP NOTE
PROCEDURE NOTE    SURGEON:Todd Deng    PRE-OP DIAGNOSIS:  Screening Colonoscopy      POST-OP DIAGNOSIS: Ascending and descending colon polyps.     PROCEDURE:  Colonoscopy with cold snare polypectomy    SPECIMEN:  Ascending colon and descending colon polyps.     ANESTHESIA:  Monitor Anesthesia Care CRNA Independent: Oziel Klein APRN CRNA; Govind Winn APRN CRNA   Coverage requested due to age over 70    ESTIMATED BLOOD LOSS: none    COMPLICATIONS:  None    INDICATION FOR THE PROCEDURE: The patient is a 73 year old male. The patient presents with need for screening. I explained to the patient the risks, benefits and alternatives to screening colonoscopy for evaluating for cancer or polyps. We specifically discussed the risks of bleeding, infection, perforation, potential inability to reach the cecum and the risks of sedation. The patient's questions were answered and the patient wished to proceed. Informed consent paperwork was completed.    PROCEDURE: The patient was taken to the endoscopy suite. Appropriate monitors were attached. The patient was placed in the left lateral decubitus position.Timeout was performed confirming the patient's identity and procedure to be performed.  After appropriate sedation was confirmed, digital rectal exam was performed.  There was normal tone and no gross abnormality was noted.  The lubricated colonoscope was introduced into the anus the colon was insufflated with air. The prep quality was adequate. Under direct visualization the scope was advanced to the cecum. The mucosa of the colon was inspected while withdrawing the scope. A small ascending and descending colon polyp were removed with cold snare. The scope was retroflexed in the rectum and the anorectal junction was inspected. No abnormalities were noted. The scope was returned to aneutral position and the colon was decompressed. The scope was removed. The patient tolerated the procedure with no immediately  apparent complication. The patient was taken to recovery in stable condition.     FOLLOW UP: RECOMMEND high fiber diet, will call with pathology results.     Todd Deng

## 2018-09-21 NOTE — H&P (VIEW-ONLY)
"Nursing Notes:   Bethany Christensen LPN  9/5/2018 10:08 AM  Signed  Patient presents in the clinic for an annual check up, and for medication management.  Verona Christensen LPN 9/5/2018 9:48 AM  Chief Complaint   Patient presents with     Medication Therapy Management       Initial /78 (BP Location: Right arm, Patient Position: Sitting, Cuff Size: Adult Large)  Pulse 68  Temp 97.6  F (36.4  C) (Tympanic)  Resp 18  Wt 210 lb 6.4 oz (95.4 kg)  BMI 30.19 kg/m2 Estimated body mass index is 30.19 kg/(m^2) as calculated from the following:    Height as of 8/13/18: 5' 10\" (1.778 m).    Weight as of this encounter: 210 lb 6.4 oz (95.4 kg).  Medication Reconciliation: complete    Bethany Christensen LPN        SUBJECTIVE:  Praveen Lovell is a 73 year old male who comes in today to follow up on HTN, ASCVD, and BPH.  He feels well and has no concerns.  He is due for a refill of medications.  He is taking the losartan and metoprolol for HTN with no side effects.  He has not had any episodes of chest pain, shortness of breath or other anginal equivalents.  He has occasional acid reflux but this is rare, occurs 3-4 times per month and resolves with OTC zanatac.  No exertional component.  Had some abdominal pain a month ago, but none since.  Reports it was in LRQ and felt like aching colicky pain.  Has not recurred.  No black or bloody stools, no other changes in bowel habits.  He has BPH and does take detrol for urinary urgency, which seems to be working. He has not had any other urinary concerns.      He is also on ASA 81mg daily and crestor without side effect.      He did fall 2d ago and scraped his forehead.  Reports it was mechanical in nature and he did not have any LOC nor has he had any subsequent symptoms concerning for concussion or bleed.    He thinks he had a colonoscopy about 10y ago but we do not have record of that, he has been receiving care locally for 5y and has not had colon cancer screening in " that time.      PHQ-9  PHQ-9 SCORE 7/25/2018 9/5/2018   Total Score 0 0         Current Outpatient Prescriptions   Medication Sig Dispense Refill     aspirin EC 81 MG EC tablet Take 81 mg by mouth       losartan (COZAAR) 100 MG tablet Take 1 tablet (100 mg) by mouth daily 90 tablet 3     metoprolol succinate (TOPROL-XL) 50 MG 24 hr tablet Take 1 tablet (50 mg) by mouth daily 90 tablet 3     nitroGLYcerin (NITROSTAT) 0.4 MG sublingual tablet Place 1 tab under the tongue every 5 mins if needed for Chest Pain (up to 3 doses). DON'T USE VIAGRA/CIALIS/LEVITRA WITHIN 48 HRS OF NITRO       rosuvastatin (CRESTOR) 20 MG tablet Take 1 tablet (20 mg) by mouth At Bedtime 90 tablet 3     tolterodine (DETROL) 2 MG tablet Take 1 tablet (2 mg) by mouth 2 times daily 180 tablet 3     bisacodyl (DULCOLAX) 5 MG EC tablet Take as directed by colonoscopy prep instructions 2 tablet 0     Past Surgical History:   Procedure Laterality Date     OTHER SURGICAL HISTORY      ,HERNIA REPAIR     OTHER SURGICAL HISTORY      12/2/14,205446,CVL CORONARY ANGIOGRAM POSS PCI,Cor angio: 90% prox LAD; s/p ZAYNAB x 1 to prox LAD with PTCA of diagonal 1 that was jailed due to plaque shifting, EF 65%         Social History     Social History     Marital status:      Spouse name: N/A     Number of children: N/A     Years of education: N/A     Occupational History     Not on file.     Social History Main Topics     Smoking status: Former Smoker     Packs/day: 1.00     Years: 30.00     Types: Cigarettes     Quit date: 1/1/1990     Smokeless tobacco: Never Used      Comment: Quit smoking: quit 7 years ago     Alcohol use 1.8 oz/week      Comment: Alcoholic Drinks/day: weekly     Drug use: No      Comment: Drug use: Not Asked     Sexual activity: Not on file     Other Topics Concern     Not on file     Social History Narrative    [employer]ail lumber business.  [employer].  Quit smoking around 1995.    Drinks once or twice a week per year.  .  Three  daughters.  6 grandkids.  One daughter in [place] -  to Dr. Rodriguez.  Two daughters and families in [place].       I have personally reviewed and updated above noted social, family and/or past medical history.    ROS  CONSTITUTIONAL: NEGATIVE for fever, chills, change in weight  ENT/MOUTH: NEGATIVE for ear, mouth and throat problems  RESP: NEGATIVE for significant cough or SOB  CV: NEGATIVE for chest pain, palpitations or peripheral edema  GI: NEGATIVE for nausea, abdominal pain, or change in bowel habits.  See HPI regarding acid reflux  : See HPI  MUSCULOSKELETAL: NEGATIVE for significant arthralgias or myalgia  NEURO: NEGATIVE for weakness, dizziness or paresthesias  PSYCHIATRIC: NEGATIVE for changes in mood or affect      /74  Pulse 68  Temp 97.6  F (36.4  C) (Tympanic)  Resp 18  Wt 210 lb 6.4 oz (95.4 kg)  BMI 30.19 kg/m2    Exam:  GENERAL: healthy, alert and no distress  EYES: Eyes grossly normal to inspection, PERRL and conjunctivae and sclerae normal  HENT: ear canals and TM's normal, nose and mouth without ulcers or lesions  NECK: no adenopathy, no asymmetry, masses, or scars and thyroid normal to palpation  RESP: lungs clear to auscultation - no rales, rhonchi or wheezes  CV: regular rate and rhythm, 1/6 SIN @ LUSB, no S3/S4, no click or rub, no peripheral edema and peripheral pulses strong  ABDOMEN: soft, nontender, no hepatosplenomegaly, no masses and bowel sounds normal  MS: no gross musculoskeletal defects noted, no edema  NEURO: Normal strength and tone, mentation intact and speech normal  SKIN:  Small abrasion on forehead.  NO concerning lesions.  PSYCH: mentation appears normal, affect normal/bright  LYMPH: no cervical, supraclavicular, axillary, or inguinal adenopathy      ASSESSMENT/Plan :    I personally reviewed the patients' labs and imaging.    Results for orders placed or performed during the hospital encounter of 08/13/18   XR Abdomen 2 Views    Narrative    PROCEDURE:  XR  ABDOMEN 2 VW    HISTORY:  pain; .     TECHNIQUE:  Upright and supine views of the abdomen were obtained.    COMPARISON:  None.    FINDINGS:     There is a nonobstructive bowel gas pattern. No free air is seen. No  abnormal calcifications are evident.      Impression    IMPRESSION:    No obstruction or free air.    ALTAF ELDRIDGE MD   *UA reflex to Microscopic   Result Value Ref Range    Color Urine Yellow     Appearance Urine Clear     Glucose Urine Negative NEG^Negative mg/dL    Bilirubin Urine Negative NEG^Negative    Ketones Urine Negative NEG^Negative mg/dL    Specific Gravity Urine >1.030 (H) 1.000 - 1.030    Blood Urine Negative NEG^Negative    pH Urine 6.0 5.0 - 9.0 pH    Protein Albumin Urine 30 (A) NEG^Negative mg/dL    Urobilinogen Urine 0.2 0.2 - 1.0 EU/dL    Nitrite Urine Negative NEG^Negative    Leukocyte Esterase Urine Negative NEG^Negative    Source Unspecified Urine    CBC with platelets differential   Result Value Ref Range    WBC 8.4 4.0 - 11.0 10e9/L    RBC Count 4.45 4.4 - 5.9 10e12/L    Hemoglobin 14.3 13.3 - 17.7 g/dL    Hematocrit 41.2 40.0 - 53.0 %    MCV 93 78 - 100 fl    MCH 32.1 26.5 - 33.0 pg    MCHC 34.7 31.5 - 36.5 g/dL    RDW 12.6 10.0 - 15.0 %    Platelet Count 184 150 - 450 10e9/L    Diff Method Automated Method     % Neutrophils 77.8 %    % Lymphocytes 12.0 %    % Monocytes 6.8 %    % Eosinophils 2.7 %    % Basophils 0.6 %    % Immature Granulocytes 0.1 %    Absolute Neutrophil 6.5 1.6 - 8.3 10e9/L    Absolute Lymphocytes 1.0 0.8 - 5.3 10e9/L    Absolute Monocytes 0.6 0.0 - 1.3 10e9/L    Absolute Eosinophils 0.2 0.0 - 0.7 10e9/L    Absolute Basophils 0.1 0.0 - 0.2 10e9/L    Abs Immature Granulocytes 0.0 0 - 0.4 10e9/L   Comprehensive metabolic panel   Result Value Ref Range    Sodium 139 134 - 144 mmol/L    Potassium 4.2 3.5 - 5.1 mmol/L    Chloride 105 98 - 107 mmol/L    Carbon Dioxide 27 21 - 31 mmol/L    Anion Gap 7 3 - 14 mmol/L    Glucose 120 (H) 70 - 105 mg/dL    Urea Nitrogen  22 7 - 25 mg/dL    Creatinine 1.07 0.70 - 1.30 mg/dL    GFR Estimate 67 >60 mL/min/1.7m2    GFR Estimate If Black 82 >60 mL/min/1.7m2    Calcium 9.4 8.6 - 10.3 mg/dL    Bilirubin Total 0.6 0.3 - 1.0 mg/dL    Albumin 4.3 3.5 - 5.7 g/dL    Protein Total 7.1 6.4 - 8.9 g/dL    Alkaline Phosphatase 100 34 - 104 U/L    ALT 20 7 - 52 U/L    AST 17 13 - 39 U/L   Urine Microscopic   Result Value Ref Range    WBC Urine 0 - 5 OTO5^0 - 5 /HPF    RBC Urine O - 2 OTO2^O - 2 /HPF       No images are attached to the encounter or orders placed in the encounter.      1. Special screening for malignant neoplasms, colon  I suspect his abdominal pain was gas pain, with history of appendectomy could have intermittent bowel obstruction but this was not seen at the time of evaluation.  I discussed with him in detail common symptoms of this.  Hgb was normal with no microcytosis.  He may be overdue for colonoscopy and would suggest this be repeated.  - GASTROENTEROLOGY ADULT REF PROCEDURE ONLY    2. Benign essential hypertension  Recent labs were normal.  BP is near goal with recheck BP of 132/74.  Nonfasting glucose elevated.  Had normal fasting blood glucose on 2-23-18    3. Coronary artery disease involving native coronary artery of native heart without angina pectoris  Patient reports he can hike 2 miles without any cardiopulmonary symptoms.  He also does TRX exercises here at [facility] without issues.  I do not note significant change in EKG from previous, due for follow up echo with cardiology for moderate aortic stenosis.  Suggest visit prior to colonoscopy.  - EKG 12-LEAD CLINIC READ    4. Benign nodular prostatic hyperplasia with lower urinary tract symptoms   Symptoms stable, see below.    5.  Low grade prostate cancer, utilizing active surveillance  Recently evaluated by Dr. Ray.  Continue with surveillance.            There are no Patient Instructions on file for this visit.    Magdaleno Palmer    This document was created using  computer generated templates and voiceactivated software.

## 2018-09-21 NOTE — TELEPHONE ENCOUNTER
Patient left hip is bothering him. He states about three weeks now there has been a pain there and he's not sure what he did. He would like to have an MRI done.

## 2018-09-21 NOTE — INTERVAL H&P NOTE
I saw and examined Praveen Mendel Nas.  I have reviewed the history and physical and find no changes to the patient's medical status or condition with the exceptions noted below.     Todd Deng   11:02 AM 9/21/2018

## 2018-09-21 NOTE — IP AVS SNAPSHOT
MRN:[id number]                      After Visit Summary   9/21/2018    Praveen Lovell    MRN: [id number]           Thank you!     Thank you for choosing [facility] for your care. Our goal is always to provide you with excellent care. Hearing back from our patients is one way we can continue to improve our services. Please take a few minutes to complete the written survey that you may receive in the mail after you visit with us. Thank you!        Patient Information     Date Of Birth          1945        Designated Caregiver       Most Recent Value    Caregiver    Will someone help with your care after discharge? yes    Name of designated caregiver wife      About your hospital stay     You were admitted on:  September 21, 2018 You last received care in the:  [facility]    You were discharged on:  September 21, 2018       Who to Call     For medical emergencies, please call 911.  For non-urgent questions about your medical care, please call your primary care provider or clinic, [phone]  For questions related to your surgery, please call your surgery clinic        Attending Provider     Provider Specialty    Todd Deng MD Surgery       Primary Care Provider Office Phone # Fax #    Magdaleno Palmer [phone]      After Care Instructions     Discharge Instructions       No driving or operating machinery until the day after procedure..postfiber            Discharge Instructions       Resume pre procedure diet and medications.  Your doctor recommends that you eat 25 to 30 Grams of fiber daily. The following are some examples of fiber amounts in different foods.    Fruits: Apple (with skin) 1 medium = 4.4 Grams   Banana      1 medium = 3.1 Grams   Oranges     1 orange = 3.1 Grams   Prunes     1 cup, pitted = 12.4 Grams    Juices: Apple, unsweetened w/ added ascorbic acid  1 cup = 0.5 Grams    Grapefruit, white, canned,sweetened  1 cup = 0.2  Grams    Grape, unsweetened w/ added ascorbic acid  1 cup = 0.5 Grams    Orange     1 cup = 0.7 Grams    Vegetables:   Cooked: Green Beans   1 cup = 4.0 Grams       Carrots   1/2 cup sliced = 2.3 Grams       Peas       1 cup = 8.8 Grams       Potato (baked, with skin)  1 medium = 3.8 Grams    Raw: Cucumber (with peel)  1 cucumber = 1.5 Grams            Lettuce     1 cup shredded = 0.5 Grams            Tomato   1 medium tomato = 1.5 Grams            Spinach  1 cup = 0.7 Grams    Legumes: Baked beans, canned, no salt added  1 cup = 13.9 Grams         Kidney Beans, canned  1 cup = 13.6 Grams         Lopez Beans, canned     1 cup = 11.6 Grams         Lentils, boiled   1 cup = 15.6 Grams    Breads, Pastas, Flours: Bran muffins   1 medium muffin = 5.2 Grams           Oatmeal, cooked  1 cup = 4.0 Grams           White Bread   1 slice = 0.6 Grams           Whole- wheat bread = 1.9 Grams    Pasta and rice, cooked: Macaroni  1 cup = 2.5 Grams           Rice, Brown  1 cup = 3.5 Grams           Rice, white   1 cup = 0.6 Grams           Spaghetti (regular) 1 cup = 2.5 Grams    Nuts: Almonds   1 cup = 17.4 Grams            Peanuts    1 cup = 12.4 Grams            Discharge Instructions       Call [phone] for questions or concerns. Call for increased abdominal pain, rectal bleeding, persistent vomiting or fever over 101.5 F  You will receive a letter in about one week with results.                  Your next 10 appointments already scheduled     Feb 18, 2019  9:10 AM CST   LAB with GH LAB   [facility] ([facility])    [address] [facility] [address]   [phone]           Please do not eat 10-12 hours before your appointment if you are coming in fasting for labs on lipids, cholesterol, or glucose (sugar). This does not apply to pregnant women. Water, hot tea and black coffee (with nothing added) are okay. Do not drink other fluids, diet soda or chew gum.            Feb  2019  9:30 AM CST   Return Visit with Romel Ray MD   [facility] ([facility])    [address]  [place] [address] [phone]              Further instructions from your care team       [facility]   Adult Discharge Orders & Instructions     For 12 hours after surgery    1. Get plenty of rest.  A responsible adult must stay with you for at least 12 hours after you leave the hospital.   2. Do not drive or use heavy equipment.  If you have weakness or tingling, don't drive or use heavy equipment until this feeling goes away.  3. Do not drink alcohol.  4. Avoid strenuous or risky activities.  Ask for help when climbing stairs.   5. You may feel lightheaded.  IF so, sit for a few minutes before standing.  Have someone help you get up.   6. If you have nausea (feel sick to your stomach): Drink only clear liquids such as apple juice, ginger ale, broth or 7-Up.  Rest may also help.  Be sure to drink enough fluids.  Move to a regular diet as you feel able.  7. You may have a slight fever. Call the doctor if your fever is over 101 F (38.3 C) (taken under the tongue) or lasts longer than 24 hours.  8. You may have a dry mouth, a sore throat, muscle aches or trouble sleeping.  These should go away after 24 hours.  9. Do not make important or legal decisions.   Call your doctor for any of the followin.  Signs of infection (fever, growing tenderness at the surgery site, a large amount of drainage or bleeding, severe pain, foul-smelling drainage, redness, swelling).    2. It has been over 8 to 10 hours since surgery and you are still not able to urinate (pass water).    3.  Headache for over 24 hours.    4.  Numbness, tingling or weakness the day after surgery (if you had spinal anesthesia).  To contact a doctor, call _________[phone]_______________________    Pending Results     No orders found from 2018 to 2018.           "  Admission Information     Date & Time Provider Department Dept. Phone    9/21/2018 Todd Deng MD [facility] [phone]      Your Vitals Were     Blood Pressure Pulse Temperature Respirations Height Weight    135/50 54 98.4  F (36.9  C) (Temporal) 14 1.753 m (5' 9\") 92.7 kg (204 lb 6.4 oz)    Pulse Oximetry BMI (Body Mass Index)                95% 30.18 kg/m2          [employer] Information     [employer] gives you secure access to your electronic health record. If you see a primary care provider, you can also send messages to your care team and make appointments. If you have questions, please call your primary care clinic.  If you do not have a primary care provider, please call [phone] and they will assist you.        Care EveryWhere ID     This is your Care EveryWhere ID. This could be used by other organizations to access your [facility] medical records  [id number]        Equal Access to Services     DAISY INGRAM : Yossi Light, warubens king, qaybta kaalmajuan daniel ledesma, georges blanco . So [facility] [phone].    ATENCIÓN: Si habla español, tiene a mejia disposición servicios gratuitos de asistencia lingüística. Llame al [phone].    We comply with applicable federal civil rights laws and [place] laws. We do not discriminate on the basis of race, color, national origin, age, disability, sex, sexual orientation, or gender identity.               Review of your medicines      CONTINUE these medicines which have NOT CHANGED        Dose / Directions    aspirin 81 MG EC tablet        Dose:  81 mg   Take 81 mg by mouth   Refills:  0       losartan 100 MG tablet   Commonly known as:  COZAAR   Used for:  Benign essential hypertension        Dose:  100 mg   Take 1 tablet (100 mg) by mouth daily   Quantity:  90 tablet   Refills:  3       metoprolol succinate 50 MG 24 hr tablet   Commonly known as:  TOPROL-XL   Used for:  Benign essential " hypertension        Dose:  50 mg   Take 1 tablet (50 mg) by mouth daily   Quantity:  90 tablet   Refills:  3       nitroGLYcerin 0.4 MG sublingual tablet   Commonly known as:  NITROSTAT        Place 1 tab under the tongue every 5 mins if needed for Chest Pain (up to 3 doses). DON'T USE VIAGRA/CIALIS/LEVITRA WITHIN 48 HRS OF NITRO   Refills:  0       rosuvastatin 20 MG tablet   Commonly known as:  CRESTOR   Used for:  Coronary artery disease due to lipid rich plaque, Mixed hyperlipidemia        Dose:  20 mg   Take 1 tablet (20 mg) by mouth At Bedtime   Quantity:  90 tablet   Refills:  3       tolterodine 2 MG tablet   Commonly known as:  DETROL   Used for:  Urinary urgency        Dose:  2 mg   Take 1 tablet (2 mg) by mouth 2 times daily   Quantity:  180 tablet   Refills:  3                Protect others around you: Learn how to safely use, store and throw away your medicines at www.disposemymeds.org.             Medication List: This is a list of all your medications and when to take them. Check marks below indicate your daily home schedule. Keep this list as a reference.      Medications           Morning Afternoon Evening Bedtime As Needed    aspirin 81 MG EC tablet   Take 81 mg by mouth                                losartan 100 MG tablet   Commonly known as:  COZAAR   Take 1 tablet (100 mg) by mouth daily                                metoprolol succinate 50 MG 24 hr tablet   Commonly known as:  TOPROL-XL   Take 1 tablet (50 mg) by mouth daily                                nitroGLYcerin 0.4 MG sublingual tablet   Commonly known as:  NITROSTAT   Place 1 tab under the tongue every 5 mins if needed for Chest Pain (up to 3 doses). DON'T USE VIAGRA/CIALIS/LEVITRA WITHIN 48 HRS OF NITRO                                rosuvastatin 20 MG tablet   Commonly known as:  CRESTOR   Take 1 tablet (20 mg) by mouth At Bedtime                                tolterodine 2 MG tablet   Commonly known as:  DETROL   Take 1 tablet (2  mg) by mouth 2 times daily

## 2018-09-21 NOTE — ANESTHESIA POSTPROCEDURE EVALUATION
Patient: Praveen Lovell    Procedure(s):  Colonoscopy - Wound Class: III-Contaminated    Diagnosis:SCREENING  Diagnosis Additional Information: No value filed.    Anesthesia Type:  MAC    Note:  Anesthesia Post Evaluation    Patient location during evaluation: Phase 2 and Bedside  Patient participation: Able to fully participate in evaluation  Level of consciousness: awake and alert  Pain management: adequate  Cardiovascular status: acceptable  Respiratory status: acceptable  Hydration status: acceptable  PONV: none     Anesthetic complications: None          Last vitals:  Vitals:    09/21/18 0913   BP: 143/89   Pulse: 54   Temp: 98.4  F (36.9  C)   SpO2: 96%         Electronically Signed By: MACY Garay CRNA  September 21, 2018  12:55 PM

## 2018-09-21 NOTE — IP AVS SNAPSHOT
[facility]    [address]    [place] [address]    Phone:  [phone]    Fax:  [phone]                                       After Visit Summary   9/21/2018    Praveen Lovell    MRN: [id number]           After Visit Summary Signature Page     I have received my discharge instructions, and my questions have been answered. I have discussed any challenges I see with this plan with the nurse or doctor.    ..........................................................................................................................................  Patient/Patient Representative Signature      ..........................................................................................................................................  Patient Representative Print Name and Relationship to Patient    ..................................................               ................................................  Date                                   Time    ..........................................................................................................................................  Reviewed by Signature/Title    ...................................................              ..............................................  Date                                               Time          22EPIC Rev 08/18

## 2018-09-21 NOTE — ANESTHESIA CARE TRANSFER NOTE
Patient: Praveen Lovell    Procedure(s):  Colonoscopy - Wound Class: III-Contaminated    Diagnosis: SCREENING  Diagnosis Additional Information: No value filed.    Anesthesia Type:   MAC     Note:  Airway :Room Air  Patient transferred to:Phase II  Handoff Report: Identifed the Patient, Identified the Reponsible Provider, Reviewed the pertinent medical history, Discussed the surgical course, Reviewed Intra-OP anesthesia mangement and issues during anesthesia, Set expectations for post-procedure period and Allowed opportunity for questions and acknowledgement of understanding      Vitals: (Last set prior to Anesthesia Care Transfer)    CRNA VITALS  9/21/2018 1215 - 9/21/2018 1249      9/21/2018             Resp Rate (set): 10    EKG: NSR                Electronically Signed By: MACY Garay CRNA  September 21, 2018  12:49 PM

## 2018-09-21 NOTE — ANESTHESIA PREPROCEDURE EVALUATION
Anesthesia Evaluation     .             ROS/MED HX    ENT/Pulmonary:       Neurologic:       Cardiovascular: Comment: Dilated ascending aorta - 4.1 cm.      (+) Dyslipidemia, hypertension--CAD, --stent,. : . . . :. dysrhythmias PVCs, valvular problems/murmurs type: AS Bicuspid aortic valve:. Previous cardiac testing Echodate:results:date: results: date: results: date: results:          METS/Exercise Tolerance:     Hematologic:         Musculoskeletal:         GI/Hepatic: Comment: Occasional sx of reflux.  Takes medication prn    (+) GERD Other, bowel prep,       Renal/Genitourinary:     (+) BPH,       Endo:  - neg endo ROS       Psychiatric:         Infectious Disease:  - neg infectious disease ROS       Malignancy:      - no malignancy   Other:    - neg other ROS                 Physical Exam  Normal systems: cardiovascular, pulmonary and dental    Airway   Mallampati: II  TM distance: >3 FB  Neck ROM: full    Dental     Cardiovascular   Rhythm and rate: regular and normal      Pulmonary    breath sounds clear to auscultation                    Anesthesia Plan      History & Physical Review      ASA Status:  3 .    NPO Status:  > 8 hours    Plan for MAC Reason for MAC:  Chronic cardiopulmonary disease (G9)    I personally spoke with Dr. Fernandes regarding Mr. Lovell's recent echocardiogram.  He recommended to proceed forward with Mr. Lovell's colonoscopy as scheduled.        Postoperative Care      Consents  Anesthetic plan, risks, benefits and alternatives discussed with:  Patient..                          .

## 2018-09-26 NOTE — TELEPHONE ENCOUNTER
Patient states prior to his physical he had fallen and bruised his left hip (around 9/5/18).  How long can he expect to feel pain from a bruised hip? Should he just wait it out? He wonders if he could get an MRI, or can he have an X-ray?  Kathleen Blue CMA([facility])..................9/26/2018   4:35 PM

## 2018-10-02 ENCOUNTER — ALLIED HEALTH/NURSE VISIT (OUTPATIENT)
Dept: FAMILY MEDICINE | Facility: OTHER | Age: 73
End: 2018-10-02
Attending: FAMILY MEDICINE
Payer: MEDICARE

## 2018-10-02 DIAGNOSIS — Z23 NEED FOR PROPHYLACTIC VACCINATION AND INOCULATION AGAINST INFLUENZA: Primary | ICD-10-CM

## 2018-10-02 PROCEDURE — G0008 ADMIN INFLUENZA VIRUS VAC: HCPCS

## 2018-10-02 PROCEDURE — 90662 IIV NO PRSV INCREASED AG IM: CPT

## 2018-10-02 NOTE — TELEPHONE ENCOUNTER
I would expect it to be resolved by now.  I would like him to come in and see one of my partners with availability for evaluation.

## 2018-10-02 NOTE — PROGRESS NOTES

## 2018-10-02 NOTE — TELEPHONE ENCOUNTER
Left message to call back.  Jovanni EARL([facility]) ....................  10/2/2018   9:51 AM

## 2018-10-02 NOTE — MR AVS SNAPSHOT
After Visit Summary   10/2/2018    Praveen Lovell    MRN: [id number]           Patient Information     Date Of Birth          1945        Visit Information        Provider Department      10/2/2018 11:15 AM  FLU [facility]        Today's Diagnoses     Need for prophylactic vaccination and inoculation against influenza    -  1       Follow-ups after your visit        Your next 10 appointments already scheduled     Feb 18, 2019  9:10 AM CST   LAB with  LAB   [facility]    [address] [facility] [address] [phone]           Please do not eat 10-12 hours before your appointment if you are coming in fasting for labs on lipids, cholesterol, or glucose (sugar). This does not apply to pregnant women. Water, hot tea and black coffee (with nothing added) are okay. Do not drink other fluids, diet soda or chew gum.            Feb 25, 2019  9:30 AM CST   Return Visit with Romel Ray MD   [facility]    [address]  [place] [address] [phone]              Who to contact     If you have questions or need follow up information about today's clinic visit or your schedule please contact [facility] directly at [phone].  Normal or non-critical lab and imaging results will be communicated to you by MyChart, letter or phone within 4 business days after the clinic has received the results. If you do not hear from us within 7 days, please contact the clinic through MyChart or phone. If you have a critical or abnormal lab result, we will notify you by phone as soon as possible.  Submit refill requests through [facility] or call your pharmacy and they will forward the refill request to us. Please allow 3 business days for your refill to be completed.          Additional Information About Your Visit         [employer] Information     [employer] gives you secure access to your electronic health record. If you see a primary care provider, you can also send messages to your care team and make appointments. If you have questions, please call your primary care clinic.  If you do not have a primary care provider, please call [phone] and they will assist you.        Care EveryWhere ID     This is your Care EveryWhere ID. This could be used by other organizations to access your [facility] medical records  [id number]         Blood Pressure from Last 3 Encounters:   09/21/18 126/90   09/20/18 138/80   09/05/18 132/74    Weight from Last 3 Encounters:   09/21/18 204 lb 6.4 oz (92.7 kg)   09/20/18 214 lb (97.1 kg)   09/05/18 210 lb 6.4 oz (95.4 kg)              We Performed the Following     HC FLU VACCINE, INCREASED ANTIGEN, PRESV FREE        Primary Care Provider Office Phone # Fax #    Magdaleno Palmer [phone]       [address] [facility] [address]        Equal Access to Services     [facility]: Hadii aad ku hadasho Soomaali, waaxda luqadaha, qaybta kaalmada baltazar, georges blanco . So [facility] [phone].    ATENCIÓN: Si habla español, tiene a mejia disposición servicios gratTuba City Regional Health Care Corporationos de asistencia lingüística. Manas al [phone].    We comply with applicable federal civil rights laws and [place] laws. We do not discriminate on the basis of race, color, national origin, age, disability, sex, sexual orientation, or gender identity.            Thank you!     Thank you for choosing [facility]  for your care. Our goal is always to provide you with excellent care. Hearing back from our patients is one way we can continue to improve our services. Please take a few minutes to complete the written survey that you may receive in the mail after your visit with us. Thank you!             Your Updated Medication List - Protect others around you: Learn how  to safely use, store and throw away your medicines at www.disposemymeds.org.          This list is accurate as of 10/2/18  3:41 PM.  Always use your most recent med list.                   Brand Name Dispense Instructions for use Diagnosis    aspirin 81 MG EC tablet      Take 81 mg by mouth        losartan 100 MG tablet    COZAAR    90 tablet    Take 1 tablet (100 mg) by mouth daily    Benign essential hypertension       metoprolol succinate 50 MG 24 hr tablet    TOPROL-XL    90 tablet    Take 1 tablet (50 mg) by mouth daily    Benign essential hypertension       nitroGLYcerin 0.4 MG sublingual tablet    NITROSTAT     Place 1 tab under the tongue every 5 mins if needed for Chest Pain (up to 3 doses). DON'T USE VIAGRA/CIALIS/LEVITRA WITHIN 48 HRS OF NITRO        rosuvastatin 20 MG tablet    CRESTOR    90 tablet    Take 1 tablet (20 mg) by mouth At Bedtime    Coronary artery disease due to lipid rich plaque, Mixed hyperlipidemia       tolterodine 2 MG tablet    DETROL    180 tablet    Take 1 tablet (2 mg) by mouth 2 times daily    Urinary urgency

## 2018-10-03 NOTE — TELEPHONE ENCOUNTER
Left message to call back.  Jovanni EARL([facility]) ....................  10/3/2018   2:36 PM

## 2018-10-03 NOTE — TELEPHONE ENCOUNTER
He says it has been gradually getting better so he would like to wait a few more weeks to see if it completely clears up. If not, then he will call for an appointment.  Kathleen Blue CMA([facility])..................10/3/2018   2:44 PM

## 2019-02-07 ENCOUNTER — MYC MEDICAL ADVICE (OUTPATIENT)
Dept: FAMILY MEDICINE | Facility: OTHER | Age: 74
End: 2019-02-07

## 2019-02-18 DIAGNOSIS — N40.1 BENIGN NODULAR PROSTATIC HYPERPLASIA WITH LOWER URINARY TRACT SYMPTOMS: ICD-10-CM

## 2019-02-18 LAB — PSA SERPL-MCNC: 4.34 NG/ML

## 2019-02-18 PROCEDURE — 36415 COLL VENOUS BLD VENIPUNCTURE: CPT | Performed by: UROLOGY

## 2019-02-18 PROCEDURE — 84153 ASSAY OF PSA TOTAL: CPT | Performed by: UROLOGY

## 2019-02-25 ENCOUNTER — OFFICE VISIT (OUTPATIENT)
Dept: UROLOGY | Facility: OTHER | Age: 74
End: 2019-02-25
Attending: UROLOGY
Payer: COMMERCIAL

## 2019-02-25 VITALS
HEART RATE: 60 BPM | DIASTOLIC BLOOD PRESSURE: 88 MMHG | RESPIRATION RATE: 12 BRPM | SYSTOLIC BLOOD PRESSURE: 132 MMHG | WEIGHT: 218.6 LBS | BODY MASS INDEX: 32.28 KG/M2

## 2019-02-25 DIAGNOSIS — C61 PROSTATE CANCER (H): Primary | ICD-10-CM

## 2019-02-25 PROCEDURE — G0463 HOSPITAL OUTPT CLINIC VISIT: HCPCS

## 2019-02-25 PROCEDURE — 99213 OFFICE O/P EST LOW 20 MIN: CPT | Performed by: UROLOGY

## 2019-02-25 ASSESSMENT — PAIN SCALES - GENERAL: PAINLEVEL: NO PAIN (0)

## 2019-02-25 NOTE — NURSING NOTE
Pt presents to clinic for a six month follow up. History of prostate cancer    Review of Systems:    Weight loss:    No     Recent fever/chills:  No   Night sweats:   No  Current skin rash:  No   Recent hair loss:  No  Heat intolerance:  No   Cold intolerance:  No  Chest pain:   No   Palpitations:   No  Shortness of breath:  No   Wheezing:   No  Constipation:    No   Diarrhea:   No   Nausea:   No   Vomiting:   No   Kidney/side pain:  Yes   Back pain:   No  Frequent headaches:  No   Dizziness:     No  Leg swelling:   No   Calf pain:    No

## 2019-02-25 NOTE — PROGRESS NOTES
Type of Visit  EST    Chief Complaint  Prostate cancer  Urinary urgency    HPI  Mr. Lovell is a 73 y.o. male with clinical low risk prostate cancer (since 10/2013) on active surveillance.   He has been on active surveillance for over 5 years.  He continues to deny unintentional weight loss, bone pain or pelvic pain.  No new symptoms since the last visit 6 months ago.    He also mentions urinary urgency.  He does drink coffee throughout the day.  He denies dysuria or hematuria  Urinary symptoms have improved over the last year and he no longer takes the Detrol.      Review of Systems  I reviewed the ROS the patient today.    Nursing Notes:   Teresa Douglas LPN  2/25/2019 10:54 AM  Signed  Pt presents to clinic for a six month follow up. History of prostate cancer    Review of Systems:    Weight loss:    No     Recent fever/chills:  No   Night sweats:   No  Current skin rash:  No   Recent hair loss:  No  Heat intolerance:  No   Cold intolerance:  No  Chest pain:   No   Palpitations:   No  Shortness of breath:  No   Wheezing:   No  Constipation:    No   Diarrhea:   No   Nausea:   No   Vomiting:   No   Kidney/side pain:  Yes   Back pain:   No  Frequent headaches:  No   Dizziness:     No  Leg swelling:   No   Calf pain:    No      Physical Exam  /88 (BP Location: Left arm, Patient Position: Sitting, Cuff Size: Adult Regular)   Pulse 60   Resp 12   Wt 99.2 kg (218 lb 9.6 oz)   BMI 32.28 kg/m    Constitutional: NAD, WDWN.  Cardiovascular: Regular rate.  Pulmonary/Chest: Respirations are even and non-labored bilaterally.  Abdominal: Soft. No distension, tenderness, masses or guarding. No CVA tenderness.  Extremities: GLORIA x 4, Warm. No clubbing.  No cyanosis.    Skin: Pink, warm and dry.  No rashes noted.  Genitourinary:  Nonpalpable bladder  Prostate:  40 grams.  Symmetric, non-tender, and no induration.      Labs  Results for LIBORIO LOVELL (MRN [id number]) as of 2/25/2019 10:55   2/18/2019 09:51    Prostate Specific Antigen 4.337 (H)     Results for LIBORIO LOVELL (MRN [id number]) as of 8/24/2018 11:26   7/25/2018 11:17   Prostate Specific Antigen 4.908 (H)     Results for LIBORIO LOVELL (MRN [id number]) as of 1/15/2018 10:05   9/24/2014 11:10 3/25/2015 11:00 9/30/2015 10:51 12/16/2015 10:00 7/22/2016 09:10 1/26/2017 08:02 7/20/2017 08:51 1/12/2018 11:00   PSA TOTAL  2.450 2.680 3.389 (H) 3.053 2.958 3.345 (H) 3.225 (H) 3.585 (H)       Pathology  12/16/2015  Grade 3+3=6 prostate cancer in 2/12 cores (80%)    10/9/2013  Grade 3+3=6 prostate cancer in 2/12 cores (15-20%)    Oncotype Dx    (from the 10/2013 specimen)  GPS:     24  Likelihood of favorable pathology: 61%    PSA:       3.389  Calculated prostate volume based on TRUS: 56 grams  PSA Density:      0.06 ng/mL/g  T stage:      cT2a    Assessment  Mr. Lovell is a 73 y.o. male with initial low risk prostate cancer (cT2a) on active surveillance and erectile dysfunction.  PSA stable    Plan  Continue active surveillance for prostate cancer with PSA q6 months  Detrol 2mg BID as needed for urinary urgency (hold)

## 2019-03-28 DIAGNOSIS — I10 BENIGN ESSENTIAL HYPERTENSION: ICD-10-CM

## 2019-03-28 RX ORDER — METOPROLOL SUCCINATE 50 MG/1
50 TABLET, EXTENDED RELEASE ORAL DAILY
Qty: 90 TABLET | Refills: 3 | OUTPATIENT
Start: 2019-03-28

## 2019-03-28 NOTE — TELEPHONE ENCOUNTER
Called placed to pharmacy questioning refill request.  Patient does not need refills at this time and should have enough medications to July.  Last office visit 9/5/2018 with 90 x 3 ordered of Toprol XL, and patient picked up on that day.  Refill declined.  Unable to complete prescription refill per RNMedication Refill Policy.................... Juanita Souza ....................  3/28/2019   3:32 PM

## 2019-06-06 ENCOUNTER — OFFICE VISIT (OUTPATIENT)
Dept: CARDIOLOGY | Facility: OTHER | Age: 74
End: 2019-06-06
Attending: INTERNAL MEDICINE
Payer: COMMERCIAL

## 2019-06-06 VITALS
WEIGHT: 216 LBS | RESPIRATION RATE: 18 BRPM | BODY MASS INDEX: 31.9 KG/M2 | HEART RATE: 52 BPM | DIASTOLIC BLOOD PRESSURE: 80 MMHG | TEMPERATURE: 95.5 F | SYSTOLIC BLOOD PRESSURE: 154 MMHG

## 2019-06-06 DIAGNOSIS — I71.21 ASCENDING AORTIC ANEURYSM (H): Primary | ICD-10-CM

## 2019-06-06 DIAGNOSIS — I35.0 MODERATE AORTIC STENOSIS: ICD-10-CM

## 2019-06-06 DIAGNOSIS — Q23.81 BICUSPID AORTIC VALVE: ICD-10-CM

## 2019-06-06 DIAGNOSIS — I42.2 HYPERTROPHIC CARDIOMYOPATHY (H): ICD-10-CM

## 2019-06-06 DIAGNOSIS — I25.10 CORONARY ARTERY DISEASE INVOLVING NATIVE CORONARY ARTERY OF NATIVE HEART WITHOUT ANGINA PECTORIS: ICD-10-CM

## 2019-06-06 DIAGNOSIS — I51.7 ASYMMETRIC SEPTAL HYPERTROPHY: ICD-10-CM

## 2019-06-06 DIAGNOSIS — E78.2 MIXED HYPERLIPIDEMIA: ICD-10-CM

## 2019-06-06 DIAGNOSIS — I10 BENIGN ESSENTIAL HYPERTENSION: ICD-10-CM

## 2019-06-06 DIAGNOSIS — Z98.890 HISTORY OF CARDIAC CATHETERIZATION: ICD-10-CM

## 2019-06-06 DIAGNOSIS — Z95.5 H/O HEART ARTERY STENT: ICD-10-CM

## 2019-06-06 PROCEDURE — 99215 OFFICE O/P EST HI 40 MIN: CPT | Performed by: INTERNAL MEDICINE

## 2019-06-06 PROCEDURE — G0463 HOSPITAL OUTPT CLINIC VISIT: HCPCS

## 2019-06-06 ASSESSMENT — PAIN SCALES - GENERAL: PAINLEVEL: NO PAIN (0)

## 2019-06-06 NOTE — NURSING NOTE
"Patient comes in for 6 month follow up. Jennifer Austin LPN ....................6/6/2019   11:29 AM  Chief Complaint   Patient presents with     Follow Up     6 month       Initial /80 (BP Location: Right arm, Patient Position: Sitting, Cuff Size: Adult Large)   Pulse 52   Temp 95.5  F (35.3  C) (Tympanic)   Resp 18   Wt 98 kg (216 lb)   BMI 31.90 kg/m   Estimated body mass index is 31.9 kg/m  as calculated from the following:    Height as of 9/21/18: 1.753 m (5' 9\").    Weight as of this encounter: 98 kg (216 lb).  Meds Reconciled: complete  Pt is on Aspirin  Pt is on a Statin  PHQ and/or MIKEY reviewed. Pt referred to PCP/MH Provider as appropriate.    Jennifer Austin LPN      "

## 2019-06-06 NOTE — PROGRESS NOTES
[facility]      Cardiology Progress Note     Assessment & Plan   Praveen Lovell is a 73 year old male who is being seen in follow-up to visit from 9/20/18. He is being followed secondary to moderate aortic stenosis with a bicuspid aortic valve and ascending aortic aneurysm.  His echo from 2/23/2018 shows an ascending aortic aneurysm at 3.8 cm. On 9/18/2018, this vessel was 4.1 cm. His echo results were discussed on 6/6/2019 with suggestion of a repeat echo in 6 months related to this accelerated enlargement of his TAAA.    He also has a history of CAD with catheterization on 12/22/2014 with stenting to his LAD. He has been free of chest pain.    He was seen for surgical risk stratification by his primary on 9/5/18.  He had an echocardiogram with findings of worsening of his aortic valve stenosis. Also, he was noted to have a mild ascending aortic aneurysm at 3.8 cm. His aortic valve was moderately stenosed. It was thickening of the anterior basal septum. He remains asymptomatic and has no specific complaints today.    Impression:  1.  Moderate AS 2/2 bicuspid aortic valve as noted on his echocardiogram from 9/18/18.  2.  Bicuspid aortic valve.  3.  Ascending aortic aneurysm at 4.1 cm on 9/18/18.  4.  Asymmetric hypertrophy of the septum.  5.  H/O cardiac cath on 12/22/14 with stenting to his LAD with a 3.5 mm x 15 mm ZAYNAB.  6.  Hyperlipidemia-controlled.  7.  Obesity.  8.  Hypertension-controlled.    Plan:  1.  We discussed his echo findings.  Related to his ascending aorta increasing to 3.8 cm to 4.1 cm, it was suggested that he had a repeat echocardiogram in 6 months.    2.  We have reviewed various options for aortic valve replacement. These were narrowed down to TAVR versus cardiothoracic surgery with a mechanical/bioprosthetic valve replacement.  For now, we will continue to follow this valve.  He is aware that in the future, he may need to have this replaced.  3.  He was told that first-degree relatives which  includes brothers and sisters as well as children need to have an echo to assess for this genetic finding.  6.  His medications remain unchanged. Continue aspirin 81 mg daily, losartan 100 mg daily, metoprolol 50 mg XL daily, sublingual nitro as needed chest pain, and Crestor 20 mg daily.  7.  He will be seen in approximately 6-month follow-up with an echo prior.      David Fernandes    Interval History   HPI:  He had last seen Dr. Antoine on 2/15/17. At that time, it was recommended he have a yearly follow-up secondary to coronary artery disease and stent placement. He had a stress test on 11/25/14 that showed concerns for wall motion abnormalities. He went on to have an angiogram which showed a 90% stenosis to his LAD. He is status post resolute drug-eluting stent 3.5 mm x 15 mm to his LAD. Following the procedure, he developed a rash. It was uncertain if his rash was secondary to contrast or secondary to Plavix. He was switched to prasugrel as a result. With these changes, he did not have a recurrence of his rash.  He does walk on a treadmill and cross-country ski in the winter being asymptomatic.     Previously, he had a stress echo on 11/25/14. He was noted have wall motion abnormalities. In addition, he was noted to have mild mitral and tricuspid regurgitation.  There were concerns for a bicuspid aortic valve, mild aortic stenosis, and concerns for an ascending aortic aneurysm.  It was suggested that he should have his 3 daughters checked for this and his brothers and sisters. He is to have his first-degree relatives evaluated. He is aware.     He is also being treated for hypertension which is presently controlled     Also, he has a history of hyperlipidemia. He is currently on Crestor 20 mg daily.  His cholesterol has been controlled.    Physical Exam   Temp: 95.5  F (35.3  C) Temp src: Tympanic BP: 154/80 Pulse: 52   Resp: 18        Vitals:    06/06/19 1127   Weight: 98 kg (216 lb)     Vital Signs with  Ranges  Temp:  [95.5  F (35.3  C)] 95.5  F (35.3  C)  Pulse:  [52] 52  Resp:  [18] 18  BP: (154)/(80) 154/80  ROS negative except that which is known the HPI.         Constitutional: awake, alert, cooperative, no apparent distress, and appears stated age  Eyes: Lids and lashes normal, pupils equal, sclera clear, conjunctiva normal  ENT: Normocephalic, without obvious abnormality, atraumatic.  Respiratory: No increased work of breathing, good air exchange, clear to auscultation bilaterally, no crackles or wheezing  Cardiovascular: Normal apical impulse, regular rate and rhythm, normal S1 and S2, no S3 or S4, and no murmur noted  Musculoskeletal: no lower extremity pitting edema present  Neurologic: Awake, alert, oriented to name, place and time.    Neuropsychiatric: General: normal, calm and normal eye contact    Medications         Data   No results found for this or any previous visit (from the past 24 hour(s)).

## 2019-06-06 NOTE — PATIENT INSTRUCTIONS
You were seen by  David Fernandes, DO      1. A Echocardiogram has been ordered to be completed now. You will be called to schedule this. You will receive instructions for testing at that time. You will be called with results.       2. A repeat Echocardiogram has been ordered to be completed in 6 months as well. You will be called to schedule this. You will receive instructions for testing at that time.      3. No other changes.     You will follow up with [facility] in 6 months, sooner if needed.       Please call the cardiology office with problems, questions, or concerns at [phone].    If you experience chest pain, chest pressure, chest tightness, shortness of breath, fainting, lightheadedness, nausea, vomiting, or other concerning symptoms, please report to the Emergency Department or call 911. These symptoms may be emergent, and best treated in the Emergency Department.     DAT LiN, RN  [facility]  [phone]

## 2019-06-20 ENCOUNTER — HOSPITAL ENCOUNTER (OUTPATIENT)
Dept: CARDIOLOGY | Facility: OTHER | Age: 74
Discharge: HOME | End: 2019-06-20
Attending: INTERNAL MEDICINE | Admitting: INTERNAL MEDICINE
Payer: COMMERCIAL

## 2019-06-20 DIAGNOSIS — I42.2 HYPERTROPHIC CARDIOMYOPATHY (H): ICD-10-CM

## 2019-06-20 DIAGNOSIS — I71.21 ASCENDING AORTIC ANEURYSM (H): ICD-10-CM

## 2019-06-20 DIAGNOSIS — I51.7 ASYMMETRIC SEPTAL HYPERTROPHY: ICD-10-CM

## 2019-06-20 DIAGNOSIS — Q23.81 BICUSPID AORTIC VALVE: ICD-10-CM

## 2019-06-20 DIAGNOSIS — I35.0 MODERATE AORTIC STENOSIS: ICD-10-CM

## 2019-06-20 PROCEDURE — 93306 TTE W/DOPPLER COMPLETE: CPT

## 2019-06-20 PROCEDURE — 93306 TTE W/DOPPLER COMPLETE: CPT | Mod: 26 | Performed by: INTERNAL MEDICINE

## 2019-06-21 ENCOUNTER — TELEPHONE (OUTPATIENT)
Dept: CARDIOLOGY | Facility: OTHER | Age: 74
End: 2019-06-21

## 2019-06-26 DIAGNOSIS — C61 PROSTATE CANCER (H): Primary | ICD-10-CM

## 2019-06-26 NOTE — PROGRESS NOTES
2/25/19  Plan  Continue active surveillance for prostate cancer with PSA q6 months  Detrol 2mg BID as needed for urinary urgency (hold)

## 2019-07-29 ENCOUNTER — OFFICE VISIT (OUTPATIENT)
Dept: UROLOGY | Facility: OTHER | Age: 74
End: 2019-07-29
Attending: UROLOGY
Payer: COMMERCIAL

## 2019-07-29 VITALS
RESPIRATION RATE: 12 BRPM | BODY MASS INDEX: 31.81 KG/M2 | WEIGHT: 215.4 LBS | SYSTOLIC BLOOD PRESSURE: 118 MMHG | DIASTOLIC BLOOD PRESSURE: 78 MMHG | HEART RATE: 76 BPM

## 2019-07-29 DIAGNOSIS — Z85.46 HISTORY OF PROSTATE CANCER: Primary | ICD-10-CM

## 2019-07-29 DIAGNOSIS — C61 PROSTATE CANCER (H): ICD-10-CM

## 2019-07-29 LAB — PSA SERPL-MCNC: 3.87 NG/ML

## 2019-07-29 PROCEDURE — 84153 ASSAY OF PSA TOTAL: CPT | Performed by: UROLOGY

## 2019-07-29 PROCEDURE — 99213 OFFICE O/P EST LOW 20 MIN: CPT | Performed by: UROLOGY

## 2019-07-29 PROCEDURE — 36415 COLL VENOUS BLD VENIPUNCTURE: CPT | Performed by: UROLOGY

## 2019-07-29 PROCEDURE — G0463 HOSPITAL OUTPT CLINIC VISIT: HCPCS | Performed by: UROLOGY

## 2019-07-29 ASSESSMENT — PAIN SCALES - GENERAL: PAINLEVEL: MODERATE PAIN (5)

## 2019-07-29 NOTE — NURSING NOTE
Review of Systems:    Weight loss:    No     Recent fever/chills:  No   Night sweats:   No  Current skin rash:  No   Recent hair loss:  No  Heat intolerance:  No   Cold intolerance:  No  Chest pain:   No   Palpitations:   No  Shortness of breath:  No   Wheezing:   No  Constipation:    No   Diarrhea:   No   Nausea:   No   Vomiting:   No   Kidney/side pain:  Yes   Back pain:   No  Frequent headaches:  No   Dizziness:     No  Leg swelling:   No   Calf pain:    No

## 2019-07-29 NOTE — PROGRESS NOTES
Type of Visit  EST    Chief Complaint  Prostate cancer    HPI  Mr. Lovell is a 74 y.o. male with clinical low risk prostate cancer (since 10/2013) on active surveillance.   He has been on active surveillance for 6 years.  He continues to deny unintentional weight loss, bone pain or pelvic pain.  No symptoms in the last 6 months.  He has started biking with an electric assist bike recently that he enjoys.  He denies new urinary symptoms.  He underwent a PSA earlier and follows up to review the results.      Review of Systems  I reviewed the ROS the patient today.    Nursing Notes:   Ashley Hansen RN  7/29/2019 11:48 AM  Signed  Review of Systems:    Weight loss:    No     Recent fever/chills:  No   Night sweats:   No  Current skin rash:  No   Recent hair loss:  No  Heat intolerance:  No   Cold intolerance:  No  Chest pain:   No   Palpitations:   No  Shortness of breath:  No   Wheezing:   No  Constipation:    No   Diarrhea:   No   Nausea:   No   Vomiting:   No   Kidney/side pain:  Yes   Back pain:   No  Frequent headaches:  No   Dizziness:     No  Leg swelling:   No   Calf pain:    No    Physical Exam  /78 (BP Location: Left arm, Patient Position: Sitting, Cuff Size: Adult Regular)   Pulse 76   Resp 12   Wt 97.7 kg (215 lb 6.4 oz)   BMI 31.81 kg/m    Constitutional: NAD, WDWN.  Cardiovascular: Regular rate.  Pulmonary/Chest: Respirations are even and non-labored bilaterally.  Abdominal: Soft. No distension, tenderness, masses or guarding. No CVA tenderness.  Extremities: GLORIA x 4, Warm. No clubbing.  No cyanosis.    Skin: Pink, warm and dry.  No rashes noted.  Genitourinary:  Nonpalpable bladder  Prostate:  40 grams.  Symmetric, non-tender, and no induration.      Labs  Results for LIBORIO LOVELL (MRN [id number]) as of 7/29/2019 12:00   2/18/2019 09:51 7/29/2019 08:24   Prostate Specific Antigen 4.337 (H) 3.869 (H)     Results for LIBORIO LOVELL (MRN [id number]) as of 8/24/2018 11:26    7/25/2018 11:17   Prostate Specific Antigen 4.908 (H)     Results for LIBORIO LOVELL (MRN [id number]) as of 1/15/2018 10:05   9/24/2014 11:10 3/25/2015 11:00 9/30/2015 10:51 12/16/2015 10:00 7/22/2016 09:10 1/26/2017 08:02 7/20/2017 08:51 1/12/2018 11:00   PSA TOTAL  2.450 2.680 3.389 (H) 3.053 2.958 3.345 (H) 3.225 (H) 3.585 (H)     Pathology  12/16/2015  Grade 3+3=6 prostate cancer in 2/12 cores (80%)    10/9/2013  Grade 3+3=6 prostate cancer in 2/12 cores (15-20%)    Oncotype Dx    (from the 10/2013 specimen)  GPS:     24  Likelihood of favorable pathology: 61%    PSA:       3.389  Calculated prostate volume based on TRUS: 56 grams  PSA Density:      0.06 ng/mL/g  T stage:      cT2a    Assessment  Mr. Lovell is a 74 y.o. male with initial low risk prostate cancer (cT2a) on active surveillance.  PSA stable.    Plan  Continue active surveillance for prostate cancer with PSA q6 months

## 2019-08-09 DIAGNOSIS — I10 BENIGN ESSENTIAL HYPERTENSION: ICD-10-CM

## 2019-08-13 RX ORDER — METOPROLOL SUCCINATE 50 MG/1
50 TABLET, EXTENDED RELEASE ORAL DAILY
Qty: 90 TABLET | Refills: 3 | Status: SHIPPED | OUTPATIENT
Start: 2019-08-13 | End: 2020-02-24

## 2019-08-13 RX ORDER — LOSARTAN POTASSIUM 100 MG/1
100 TABLET ORAL DAILY
Qty: 90 TABLET | Refills: 3 | Status: SHIPPED | OUTPATIENT
Start: 2019-08-13 | End: 2020-02-24

## 2019-08-13 NOTE — TELEPHONE ENCOUNTER
Prescription approved per [facility] Refill Protocol.  Cardiac medications reviewed and continued by Dr. Fernandes on 6-6-19. Macey Mccloud RN on 8/13/2019 at 1:07 PM

## 2019-09-05 ENCOUNTER — TELEPHONE (OUTPATIENT)
Dept: UROLOGY | Facility: OTHER | Age: 74
End: 2019-09-05

## 2019-09-06 NOTE — TELEPHONE ENCOUNTER
Pt is documenting for family history and needed his [place] score.  Score given  Teresa Douglas LPN.......9/6/2019 11:06 AM

## 2019-09-26 DIAGNOSIS — R39.15 URINARY URGENCY: ICD-10-CM

## 2019-09-26 RX ORDER — TOLTERODINE TARTRATE 2 MG/1
2 TABLET, EXTENDED RELEASE ORAL 2 TIMES DAILY
Qty: 180 TABLET | Refills: 3 | Status: SHIPPED | OUTPATIENT
Start: 2019-09-26 | End: 2020-02-24

## 2019-09-26 NOTE — TELEPHONE ENCOUNTER
"Pt.'s last office visit with Romel Ray MD was on 7/29/19 and note states:  \"Plan  Continue active surveillance for prostate cancer with PSA q6 months.\"    Sent to MD to address.      Carmen DAUGHERTYN, RN on 9/26/2019 at 2:02 PM      "

## 2019-10-14 ENCOUNTER — ALLIED HEALTH/NURSE VISIT (OUTPATIENT)
Dept: FAMILY MEDICINE | Facility: OTHER | Age: 74
End: 2019-10-14
Payer: COMMERCIAL

## 2019-10-14 DIAGNOSIS — Z23 NEED FOR PROPHYLACTIC VACCINATION AND INOCULATION AGAINST INFLUENZA: Primary | ICD-10-CM

## 2019-10-14 PROCEDURE — 90662 IIV NO PRSV INCREASED AG IM: CPT

## 2019-10-14 PROCEDURE — 90471 IMMUNIZATION ADMIN: CPT

## 2019-11-26 DIAGNOSIS — Z85.46 HISTORY OF PROSTATE CANCER: Primary | ICD-10-CM

## 2019-12-05 ENCOUNTER — OFFICE VISIT (OUTPATIENT)
Dept: CARDIOLOGY | Facility: OTHER | Age: 74
End: 2019-12-05
Attending: INTERNAL MEDICINE
Payer: COMMERCIAL

## 2019-12-05 VITALS
HEIGHT: 70 IN | SYSTOLIC BLOOD PRESSURE: 124 MMHG | TEMPERATURE: 97.3 F | RESPIRATION RATE: 16 BRPM | HEART RATE: 63 BPM | OXYGEN SATURATION: 98 % | DIASTOLIC BLOOD PRESSURE: 72 MMHG | BODY MASS INDEX: 30.9 KG/M2 | WEIGHT: 215.8 LBS

## 2019-12-05 DIAGNOSIS — I51.7 ASYMMETRIC SEPTAL HYPERTROPHY: ICD-10-CM

## 2019-12-05 DIAGNOSIS — I25.10 CORONARY ARTERY DISEASE INVOLVING NATIVE CORONARY ARTERY OF NATIVE HEART WITHOUT ANGINA PECTORIS: ICD-10-CM

## 2019-12-05 DIAGNOSIS — I42.2 HYPERTROPHIC CARDIOMYOPATHY (H): ICD-10-CM

## 2019-12-05 DIAGNOSIS — I71.21 ASCENDING AORTIC ANEURYSM (H): ICD-10-CM

## 2019-12-05 DIAGNOSIS — I10 BENIGN ESSENTIAL HYPERTENSION: ICD-10-CM

## 2019-12-05 DIAGNOSIS — I35.0 MODERATE AORTIC STENOSIS: Primary | ICD-10-CM

## 2019-12-05 DIAGNOSIS — Z98.890 HISTORY OF CARDIAC CATHETERIZATION: ICD-10-CM

## 2019-12-05 DIAGNOSIS — Q23.81 BICUSPID AORTIC VALVE: ICD-10-CM

## 2019-12-05 DIAGNOSIS — E78.2 MIXED HYPERLIPIDEMIA: ICD-10-CM

## 2019-12-05 DIAGNOSIS — Z95.5 H/O HEART ARTERY STENT: ICD-10-CM

## 2019-12-05 PROCEDURE — G0463 HOSPITAL OUTPT CLINIC VISIT: HCPCS

## 2019-12-05 PROCEDURE — 93010 ELECTROCARDIOGRAM REPORT: CPT | Performed by: INTERNAL MEDICINE

## 2019-12-05 PROCEDURE — 99214 OFFICE O/P EST MOD 30 MIN: CPT | Performed by: INTERNAL MEDICINE

## 2019-12-05 PROCEDURE — 93005 ELECTROCARDIOGRAM TRACING: CPT

## 2019-12-05 RX ORDER — NITROGLYCERIN 0.4 MG/1
0.4 TABLET SUBLINGUAL EVERY 5 MIN PRN
Qty: 25 TABLET | Refills: 3 | Status: SHIPPED | OUTPATIENT
Start: 2019-12-05 | End: 2020-06-04

## 2019-12-05 ASSESSMENT — MIFFLIN-ST. JEOR: SCORE: 1715.11

## 2019-12-05 ASSESSMENT — PAIN SCALES - GENERAL: PAINLEVEL: NO PAIN (1)

## 2019-12-05 NOTE — NURSING NOTE
"Chief Complaint   Patient presents with     Follow Up     6 month follow up    Patient presents to the clinic today for a 6 month follow up   Chief Complaint   Patient presents with     Follow Up     6 month follow up        Initial /72 (BP Location: Right arm, Patient Position: Sitting, Cuff Size: Adult Large)   Pulse 63   Temp 97.3  F (36.3  C) (Axillary)   Resp 16   Ht 1.778 m (5' 10\")   Wt 97.9 kg (215 lb 12.8 oz)   SpO2 98%   BMI 30.96 kg/m   Estimated body mass index is 30.96 kg/m  as calculated from the following:    Height as of this encounter: 1.778 m (5' 10\").    Weight as of this encounter: 97.9 kg (215 lb 12.8 oz).  Meds Reconciled: complete  Pt is on Aspirin  Pt is on a Statin  PHQ and/or MIKEY reviewed. Pt referred to PCP/MH Provider as appropriate.    Medina Bojorquez LPN        Medication Reconciliation: completed   Medina Bojorquez LPN  12/5/2019 10:08 AM   "

## 2019-12-05 NOTE — PATIENT INSTRUCTIONS
You were seen by  David Fernandes, DO       1. A Echocardiogram has been ordered for within the next 1-2 weeks . You will be called to schedule this. You will receive instructions for testing at that time. You will be contacted with results.       2. A Echocardiogram has been ordered also for 6 months. You will be called to schedule this. You will receive instructions for testing at that time. You will be contacted with results.      3. No other changes.      You will follow up with [facility] in 6 months, sooner if needed.       Please call the cardiology office with problems, questions, or concerns at [phone].    If you experience chest pain, chest pressure, chest tightness, shortness of breath, fainting, lightheadedness, nausea, vomiting, or other concerning symptoms, please report to the Emergency Department or call 911. These symptoms may be emergent, and best treated in the Emergency Department.     Cardiology Nurses  AISHA Lin, DAT BLANDON LPN  [facility] (Unit 3C)  [phone]

## 2019-12-05 NOTE — PROGRESS NOTES
[facility]      Cardiology Progress Note     Assessment & Plan   Praveen Lovell is a 74 year old male who is being seen in follow-up to visit from 6/6/19.     He had last seen Dr. Antoine on 2/15/17. At that time, it was recommended he have a yearly follow-up secondary to coronary artery disease and stent placement. He had a stress test on 11/25/14 that showed concerns for wall motion abnormalities. He went on to have an angiogram which showed a 90% stenosis to his LAD. He is status post ZAYNAB stent 3.5 mm x 15 mm to his LAD. Following the procedure, he developed a rash. It was uncertain if his rash was secondary to contrast or secondary to Plavix. He was switched to prasugrel as a result. With these changes, he did not have a recurrence of his rash.  He has been struggling without issues.     Previously, he had a stress echo on 11/25/14. He was noted have wall motion abnormalities. In addition, he was noted to have mild mitral and tricuspid regurgitation.  There were concerns for a bicuspid aortic valve, mild aortic stenosis, and concerns for an ascending aortic aneurysm.  It was suggested that he should have first-degree relatives checked. He is aware.     He is also being treated for hypertension which is presently controlled.     He has a history of hyperlipidemia. He is currently on Crestor 20 mg daily.  His cholesterol has been controlled.    He is being followed secondary to moderate/severe aortic stenosis with a bicuspid aortic valve and a mild ascending aortic aneurysm.  His echo from 2/23/2018 shows an ascending aortic aneurysm at 3.8 cm. On 9/18/2018, this vessel was 4.1 cm and on 6/20/19 at 4.0 cm. His echo results were discussed on 6/6/2019 with suggestion of a repeat echo in 6 months related to this accelerated enlargement of his TAAA.    He was seen for surgical risk stratification by his primary on 9/5/18.  He had an echocardiogram with findings of worsening of his aortic valve stenosis. Also, he was  noted to have a mild ascending aortic aneurysm at 3.8 cm. His aortic valve was moderately stenosed. It was thickening of the anterior basal septum. He remains asymptomatic and has no specific complaints today.    Impression:  1.  Moderate/severe AS 2/2 bicuspid aortic valve as noted on his echo from 6/20/19.  2.  Bicuspid aortic valve.  3.  TAAA at 4.0 cm on 6/20/19.  4.  Asymmetric hypertrophy of the septum.  5.  H/O cardiac cath on 12/22/14 with stenting to his LAD with a 3.5 mm x 15 mm ZAYNAB.  6.  Hyperlipidemia-controlled.  7.  Obesity.  8.  Hypertension-controlled.    Plan:  1.  We discussed his echo findings from 6/20/2019.  His ascending aorta is stable, it was suggested that he had a repeat echocardiogram in 6 months.  His aortic valve has gotten a little worse.  2.  He will have a repeat echo in 6 months.  7.  He will be seen in approximately 6-month follow-up with an echo prior.      David Fernandes          Physical Exam   Temp: 97.3  F (36.3  C) Temp src: Axillary BP: 124/72 Pulse: 63   Resp: 16 SpO2: 98 %      Vitals:    12/05/19 1006   Weight: 97.9 kg (215 lb 12.8 oz)     Vital Signs with Ranges  Temp:  [97.3  F (36.3  C)] 97.3  F (36.3  C)  Pulse:  [63] 63  Resp:  [16] 16  BP: (124)/(72) 124/72  SpO2:  [98 %] 98 %  ROS negative except that which is known the HPI.         Constitutional: awake, alert, cooperative, no apparent distress, and appears stated age  Eyes: Lids and lashes normal, pupils equal, sclera clear, conjunctiva normal  ENT: Normocephalic, without obvious abnormality, atraumatic.  Respiratory: No increased work of breathing, good air exchange, clear to auscultation bilaterally, no crackles or wheezing  Cardiovascular: Normal apical impulse, regular rate and rhythm, normal S1 and S2, no S3 or S4, and no murmur noted  Musculoskeletal: no lower extremity pitting edema present  Neurologic: Awake, alert, oriented to name, place and time.    Neuropsychiatric: General: normal, calm and normal  eye contact    Medications         Data   No results found for this or any previous visit (from the past 24 hour(s)).

## 2019-12-18 ENCOUNTER — HOSPITAL ENCOUNTER (OUTPATIENT)
Dept: CARDIOLOGY | Facility: OTHER | Age: 74
Discharge: HOME | End: 2019-12-18
Attending: INTERNAL MEDICINE | Admitting: INTERNAL MEDICINE
Payer: COMMERCIAL

## 2019-12-18 DIAGNOSIS — I51.7 ASYMMETRIC SEPTAL HYPERTROPHY: ICD-10-CM

## 2019-12-18 DIAGNOSIS — I35.0 MODERATE AORTIC STENOSIS: ICD-10-CM

## 2019-12-18 DIAGNOSIS — Z95.5 H/O HEART ARTERY STENT: ICD-10-CM

## 2019-12-18 DIAGNOSIS — Z98.890 HISTORY OF CARDIAC CATHETERIZATION: ICD-10-CM

## 2019-12-18 DIAGNOSIS — I25.10 CORONARY ARTERY DISEASE INVOLVING NATIVE CORONARY ARTERY OF NATIVE HEART WITHOUT ANGINA PECTORIS: ICD-10-CM

## 2019-12-18 DIAGNOSIS — I10 BENIGN ESSENTIAL HYPERTENSION: ICD-10-CM

## 2019-12-18 DIAGNOSIS — I71.21 ASCENDING AORTIC ANEURYSM (H): Primary | ICD-10-CM

## 2019-12-18 DIAGNOSIS — I42.2 HYPERTROPHIC CARDIOMYOPATHY (H): ICD-10-CM

## 2019-12-18 DIAGNOSIS — I71.21 ASCENDING AORTIC ANEURYSM (H): ICD-10-CM

## 2019-12-18 DIAGNOSIS — Q23.81 BICUSPID AORTIC VALVE: ICD-10-CM

## 2019-12-18 PROCEDURE — 93306 TTE W/DOPPLER COMPLETE: CPT

## 2019-12-18 PROCEDURE — 93306 TTE W/DOPPLER COMPLETE: CPT | Mod: 26 | Performed by: INTERNAL MEDICINE

## 2019-12-19 NOTE — PROGRESS NOTES
Patient Information     Patient Name MRN Sex Praveen Painter [id number] Male 1945      Progress Notes by Romel Ray MD at 1/15/2018  9:45 AM     Author:  Romel Ray MD Service:  (none) Author Type:  Physician     Filed:  1/15/2018 10:19 AM Encounter Date:  1/15/2018 Status:  Signed     :  Romel Ray MD (Physician)            Type of Visit  EST    Chief Complaint  Prostate cancer  BPH    HPI  Mr. Lovell is a 72 y.o. male with clinical low risk prostate cancer (since 10/2013) on active surveillance.   He continues to report no changes in the last 6 months.  He has been on active surveillance for over years.  He continues to deny new leg swelling, unintentional weight loss or new acute urinary symptoms.  All symptoms are stable from the last 6 months.    History of prostate cancer-associated HPI  New leg swelling?  No  Shortness of breath?  No  Bone, rib or back pain? No      Review of Systems  I reviewed the ROS the patient today.    Nursing Notes:   Christina Meehan  1/15/2018  9:57 AM  Unsigned  Here for 6 month follow up on PSA.  Review of Systems:    Weight loss:    No     Recent fever/chills:  No   Night sweats:   No  Current skin rash:  No   Recent hair loss:  No  Heat intolerance:  No   Cold intolerance:  No  Chest pain:   No   Palpitations:   No  Shortness of breath:  No   Wheezing:   No  Constipation:    No   Diarrhea:   No   Nausea:   No   Vomiting:   No   Kidney/side pain:  No   Back pain:   No  Frequent headaches:  No   Dizziness:     No  Leg swelling:   No   Calf pain:    No    Christina Meehan LPN  1/15/2018  9:57 AM              Physical Exam  /76  Resp 16  Wt 98 kg (216 lb)  BMI 30.13 kg/[id number]: NAD, WDWN.  Cardiovascular: Regular rate.  Pulmonary/Chest: Respirations are even and non-labored bilaterally.  Abdominal: Soft. No distension, tenderness, masses or guarding. No CVA tenderness.  Extremities: GLORIA x 4, Warm. No clubbing.  No cyanosis.    Skin:  Pink, warm and dry.  No rashes noted.  Genitourinary:  Nonpalpable bladder  Prostate:  40 grams.  Symmetric, non-tender, and no induration.      Labs  Results for LIBORIO LOVELL (MRN [id number]) as of 1/15/2018 10:05   9/24/2014 11:10 3/25/2015 11:00 9/30/2015 10:51 12/16/2015 10:00 7/22/2016 09:10 1/26/2017 08:02 7/20/2017 08:51 1/12/2018 11:00   PSA TOTAL (DIAGNOSTIC) 2.450 2.680 3.389 (H) 3.053 2.958 3.345 (H) 3.225 (H) 3.585 (H)       Pathology  10/9/2013  Grade 3+3=6 prostate cancer in 2/12 cores (15-20%)    12/16/2015  Grade 3+3=6 prostate cancer in 2/12 cores (80%)    Oncotype Dx    (from the 10/2013 specimen)  GPS:     24  Likelihood of favorable pathology: 61%    PSA:       3.389  Calculated prostate volume based on TRUS: 56 grams  PSA Density:      0.06 ng/mL/g  T stage:      cT2a    Assessment  Mr. Lovell is a 72 y.o. male with initial low risk prostate cancer (cT2a) on active surveillance and erectile dysfunction.  PSA stable    Plan  Continue active surveillance for prostate cancer with PSA q6 months         No indicators present

## 2020-02-03 ENCOUNTER — OFFICE VISIT (OUTPATIENT)
Dept: UROLOGY | Facility: OTHER | Age: 75
End: 2020-02-03
Attending: UROLOGY
Payer: COMMERCIAL

## 2020-02-03 VITALS
HEART RATE: 64 BPM | RESPIRATION RATE: 16 BRPM | DIASTOLIC BLOOD PRESSURE: 64 MMHG | WEIGHT: 216 LBS | BODY MASS INDEX: 30.99 KG/M2 | SYSTOLIC BLOOD PRESSURE: 118 MMHG

## 2020-02-03 DIAGNOSIS — C61 PROSTATE CANCER (H): Primary | ICD-10-CM

## 2020-02-03 DIAGNOSIS — Z85.46 HISTORY OF PROSTATE CANCER: ICD-10-CM

## 2020-02-03 LAB — PSA SERPL-MCNC: 4.74 NG/ML

## 2020-02-03 PROCEDURE — 84153 ASSAY OF PSA TOTAL: CPT | Mod: ZL | Performed by: UROLOGY

## 2020-02-03 PROCEDURE — G0463 HOSPITAL OUTPT CLINIC VISIT: HCPCS

## 2020-02-03 PROCEDURE — 99213 OFFICE O/P EST LOW 20 MIN: CPT | Performed by: UROLOGY

## 2020-02-03 PROCEDURE — 36415 COLL VENOUS BLD VENIPUNCTURE: CPT | Mod: ZL | Performed by: UROLOGY

## 2020-02-03 ASSESSMENT — PAIN SCALES - GENERAL: PAINLEVEL: SEVERE PAIN (7)

## 2020-02-03 NOTE — PROGRESS NOTES
Type of Visit  EST    Chief Complaint  Prostate cancer on active surveillance  Urinary urgency and frequency    HPI  Mr. Lovell is a 74 y.o. male with clinical low risk prostate cancer (since 10/2013) on active surveillance.   The patient continues to report stable urinary symptoms.  He does take Detrol 2 mg for overactive bladder symptoms.  He has started and stopped this medication multiple times in the past.  He is not completely confident that it improves his symptoms.    Regarding his prostate cancer management that was diagnosed almost 7 years ago  Patient denies unintentional weight loss, bone or pelvic pain.  His PSAs all have been quite stable for the last 7 years.  He follows up today with a PSA prior to the visit.  He denies any new symptoms in the last 6 months.      Review of Systems  I reviewed the ROS the patient today.    Nursing Notes:   Teresa Douglas LPN  2/3/2020  1:10 PM  Signed  Pt presents to clinic for a six month prostate cancer follow up    Review of Systems:    Weight loss:    No     Recent fever/chills:  No   Night sweats:   No  Current skin rash:  No   Recent hair loss:  No  Heat intolerance:  No   Cold intolerance:  No  Chest pain:   No   Palpitations:   No  Shortness of breath:  No   Wheezing:   No  Constipation:    No   Diarrhea:   No   Nausea:   No   Vomiting:   No   Kidney/side pain:  No   Back pain:   No  Frequent headaches:  No   Dizziness:     No  Leg swelling:   No   Calf pain:    No        Physical Exam  /64 (BP Location: Left arm, Patient Position: Sitting, Cuff Size: Adult Large)   Pulse 64   Resp 16   Wt 98 kg (216 lb)   BMI 30.99 kg/m    Constitutional: NAD, WDWN.  Cardiovascular: Regular rate.  Pulmonary/Chest: Respirations are even and non-labored bilaterally.  Abdominal: Soft. No distension, tenderness, masses or guarding. No CVA tenderness.  Extremities: GLORIA x 4, Warm. No clubbing.  No cyanosis.    Skin: Pink, warm and dry.  No rashes  noted.  Genitourinary:  Nonpalpable bladder  Prostate:  40 grams.  Symmetric, non-tender, and no induration.      Labs  Results for LIBORIO LOVELL (MRN [id number]) as of 2/3/2020 13:22   2/3/2020 09:52   Prostate Specific Antigen 4.744 (H)     Results for LIBORIO LOVELL (MRN [id number]) as of 7/29/2019 12:00   2/18/2019 09:51 7/29/2019 08:24   Prostate Specific Antigen 4.337 (H) 3.869 (H)     Results for LIBORIO LOVELL (MRN [id number]) as of 8/24/2018 11:26   7/25/2018 11:17   Prostate Specific Antigen 4.908 (H)     Results for LIBORIO LOVELL (MRN [id number]) as of 1/15/2018 10:05   9/24/2014 11:10 3/25/2015 11:00 9/30/2015 10:51 12/16/2015 10:00 7/22/2016 09:10 1/26/2017 08:02 7/20/2017 08:51 1/12/2018 11:00   PSA TOTAL  2.450 2.680 3.389 (H) 3.053 2.958 3.345 (H) 3.225 (H) 3.585 (H)     Pathology  12/16/2015  Grade 3+3=6 prostate cancer in 2/12 cores (80%)    10/9/2013  Grade 3+3=6 prostate cancer in 2/12 cores (15-20%)    Oncotype Dx    (from the 10/2013 specimen)  GPS:     24  Likelihood of favorable pathology: 61%    PSA:       3.389  Calculated prostate volume based on TRUS: 56 grams  PSA Density:      0.06 ng/mL/g  T stage:      cT2a    Assessment  Mr. Lovell is a 74 y.o. male with initial low risk prostate cancer (cT2a) on active surveillance with urinary urgency and frequency.  PSA stable.  I recommended he trial a without Detrol and if his symptoms do not significantly worsen he should stay off the medication.    Plan  Continue active surveillance for prostate cancer with PSA q6 months  Hold Detrol and if no worsening urinary sx then discontinue.

## 2020-02-08 ENCOUNTER — HEALTH MAINTENANCE LETTER (OUTPATIENT)
Age: 75
End: 2020-02-08

## 2020-02-24 ENCOUNTER — MYC MEDICAL ADVICE (OUTPATIENT)
Dept: FAMILY MEDICINE | Facility: OTHER | Age: 75
End: 2020-02-24

## 2020-02-24 ENCOUNTER — OFFICE VISIT (OUTPATIENT)
Dept: FAMILY MEDICINE | Facility: OTHER | Age: 75
End: 2020-02-24
Attending: FAMILY MEDICINE
Payer: COMMERCIAL

## 2020-02-24 VITALS
OXYGEN SATURATION: 96 % | SYSTOLIC BLOOD PRESSURE: 112 MMHG | BODY MASS INDEX: 31.13 KG/M2 | DIASTOLIC BLOOD PRESSURE: 72 MMHG | HEART RATE: 86 BPM | RESPIRATION RATE: 16 BRPM | HEIGHT: 69 IN | TEMPERATURE: 97.6 F | WEIGHT: 210.2 LBS

## 2020-02-24 DIAGNOSIS — I25.10 CORONARY ARTERY DISEASE INVOLVING NATIVE CORONARY ARTERY OF NATIVE HEART WITHOUT ANGINA PECTORIS: ICD-10-CM

## 2020-02-24 DIAGNOSIS — Z00.00 ENCOUNTER FOR MEDICARE ANNUAL WELLNESS EXAM: Primary | ICD-10-CM

## 2020-02-24 DIAGNOSIS — R79.89 ELEVATED SERUM CREATININE: Primary | ICD-10-CM

## 2020-02-24 DIAGNOSIS — E78.2 MIXED HYPERLIPIDEMIA: ICD-10-CM

## 2020-02-24 DIAGNOSIS — E55.9 VITAMIN D INSUFFICIENCY: ICD-10-CM

## 2020-02-24 DIAGNOSIS — N40.1 BENIGN NODULAR PROSTATIC HYPERPLASIA WITH LOWER URINARY TRACT SYMPTOMS: ICD-10-CM

## 2020-02-24 DIAGNOSIS — I10 BENIGN ESSENTIAL HYPERTENSION: ICD-10-CM

## 2020-02-24 LAB
ALBUMIN SERPL-MCNC: 4.7 G/DL (ref 3.5–5.7)
ALP SERPL-CCNC: 82 U/L (ref 34–104)
ALT SERPL W P-5'-P-CCNC: 19 U/L (ref 7–52)
ANION GAP SERPL CALCULATED.3IONS-SCNC: 8 MMOL/L (ref 3–14)
AST SERPL W P-5'-P-CCNC: 19 U/L (ref 13–39)
BILIRUB SERPL-MCNC: 0.7 MG/DL (ref 0.3–1)
BUN SERPL-MCNC: 24 MG/DL (ref 7–25)
CALCIUM SERPL-MCNC: 9.8 MG/DL (ref 8.6–10.3)
CHLORIDE SERPL-SCNC: 101 MMOL/L (ref 98–107)
CHOLEST SERPL-MCNC: 145 MG/DL
CO2 SERPL-SCNC: 31 MMOL/L (ref 21–31)
CREAT SERPL-MCNC: 1.43 MG/DL (ref 0.7–1.3)
DEPRECATED CALCIDIOL+CALCIFEROL SERPL-MC: 16 NG/ML
ERYTHROCYTE [DISTWIDTH] IN BLOOD BY AUTOMATED COUNT: 12.7 % (ref 10–15)
GFR SERPL CREATININE-BSD FRML MDRD: 48 ML/MIN/{1.73_M2}
GLUCOSE SERPL-MCNC: 114 MG/DL (ref 70–105)
HCT VFR BLD AUTO: 43.7 % (ref 40–53)
HDLC SERPL-MCNC: 44 MG/DL (ref 23–92)
HGB BLD-MCNC: 14.6 G/DL (ref 13.3–17.7)
LDLC SERPL CALC-MCNC: 61 MG/DL
MCH RBC QN AUTO: 30.9 PG (ref 26.5–33)
MCHC RBC AUTO-ENTMCNC: 33.4 G/DL (ref 31.5–36.5)
MCV RBC AUTO: 93 FL (ref 78–100)
NONHDLC SERPL-MCNC: 101 MG/DL
PLATELET # BLD AUTO: 189 10E9/L (ref 150–450)
POTASSIUM SERPL-SCNC: 4 MMOL/L (ref 3.5–5.1)
PROT SERPL-MCNC: 7.4 G/DL (ref 6.4–8.9)
RBC # BLD AUTO: 4.72 10E12/L (ref 4.4–5.9)
SODIUM SERPL-SCNC: 140 MMOL/L (ref 134–144)
TRIGL SERPL-MCNC: 202 MG/DL
WBC # BLD AUTO: 5.6 10E9/L (ref 4–11)

## 2020-02-24 PROCEDURE — 36415 COLL VENOUS BLD VENIPUNCTURE: CPT | Mod: ZL | Performed by: FAMILY MEDICINE

## 2020-02-24 PROCEDURE — 85027 COMPLETE CBC AUTOMATED: CPT | Mod: ZL | Performed by: FAMILY MEDICINE

## 2020-02-24 PROCEDURE — 82306 VITAMIN D 25 HYDROXY: CPT | Mod: ZL | Performed by: FAMILY MEDICINE

## 2020-02-24 PROCEDURE — 80053 COMPREHEN METABOLIC PANEL: CPT | Mod: ZL | Performed by: FAMILY MEDICINE

## 2020-02-24 PROCEDURE — G0463 HOSPITAL OUTPT CLINIC VISIT: HCPCS

## 2020-02-24 PROCEDURE — 80061 LIPID PANEL: CPT | Mod: ZL | Performed by: FAMILY MEDICINE

## 2020-02-24 PROCEDURE — G0438 PPPS, INITIAL VISIT: HCPCS | Performed by: FAMILY MEDICINE

## 2020-02-24 RX ORDER — METOPROLOL SUCCINATE 25 MG/1
25 TABLET, EXTENDED RELEASE ORAL DAILY
Qty: 90 TABLET | Refills: 3 | Status: SHIPPED | OUTPATIENT
Start: 2020-02-24 | End: 2020-06-04

## 2020-02-24 RX ORDER — ROSUVASTATIN CALCIUM 20 MG/1
20 TABLET, COATED ORAL AT BEDTIME
Qty: 90 TABLET | Refills: 3 | Status: SHIPPED | OUTPATIENT
Start: 2020-02-24 | End: 2020-06-04

## 2020-02-24 RX ORDER — LOSARTAN POTASSIUM 100 MG/1
100 TABLET ORAL DAILY
Qty: 90 TABLET | Refills: 3 | Status: SHIPPED | OUTPATIENT
Start: 2020-02-24 | End: 2020-06-04

## 2020-02-24 ASSESSMENT — PAIN SCALES - GENERAL: PAINLEVEL: NO PAIN (0)

## 2020-02-24 ASSESSMENT — MIFFLIN-ST. JEOR: SCORE: 1672.22

## 2020-02-24 NOTE — PATIENT INSTRUCTIONS
Try taking Crestor as much as possible.  Checking vitamin D level. Try Vitamin D3 2,000 units daily to reduce chance of muscle aching  Consider Coenzyme Q10 100 to 150 mg daily to reduce chance of muscle aches        Patient Education   Personalized Prevention Plan  You are due for the preventive services outlined below.  Your care team is available to assist you in scheduling these services.  If you have already completed any of these items, please share that information with your care team to update in your medical record.  Health Maintenance Due   Topic Date Due     Annual Wellness Visit  07/18/2008     PHQ-2  01/01/2020

## 2020-02-24 NOTE — PROGRESS NOTES
"SUBJECTIVE:   Praveen Lovell is a 74 year old male who presents for Preventive Visit.    Are you in the first 12 months of your Medicare Part B coverage?  No    Physical Health:    In general, how would you rate your overall physical health? good    Outside of work, how many days during the week do you exercise? 4-5 days/week    Outside of work, approximately how many minutes a day do you exercise?45-60 minutes    If you drink alcohol do you typically have >3 drinks per day or >7 drinks per week? No    Do you usually eat at least 4 servings of fruit and vegetables a day, include whole grains & fiber and avoid regularly eating high fat or \"junk\" foods? NO    Do you have any problems taking medications regularly?  No    Do you have any side effects from medications? none    Needs assistance for the following daily activities: no assistance needed    Which of the following safety concerns are present in your home?  lack of grab bars in the bathroom     Hearing impairment: Yes, Difficulty following a conversation in a noisy restaurant or crowded room.    Feel that people are mumbling or not speaking clearly.    Difficult to understand a speaker at a public meeting or Christianity service.    Need to ask people to speak up or repeat themselves.    Find that men's voices are easier to understand than women's.    Difficulty understanding soft or whispered speech.    In the past 6 months, have you been bothered by leaking of urine? no    Mental Health:    In general, how would you rate your overall mental or emotional health? good  PHQ-2 Score: 0    Do you feel safe in your environment? Yes    Have you ever done Advance Care Planning? (For example, a Health Directive, POLST, or a discussion with a medical provider or your loved ones about your wishes): Yes, advance care planning is on file.    Additional concerns to address?  No    Fall risk:  Fallen 2 or more times in the past year?: No  Any fall with injury in the past " year?: No    Cognitive Screenin) Repeat 3 items (Leader, Season, Table)    2) Clock draw: NORMAL  3) 3 item recall: Recalls 1 object   Results: NORMAL clock, 1-2 items recalled: COGNITIVE IMPAIRMENT LESS LIKELY    Mini-CogTM Copyright S Mathieu. Licensed by the author for use in [facility]; reprinted with permission (dane@[facility]). All rights reserved.      Do you have sleep apnea, excessive snoring or daytime drowsiness?: no    PROBLEMS TO ADD ON...    Has ASCVD, aortic stenosis, and mild ascending aortic aneurysm. Following with cardiology. No recent medication changes. He was taking a lower metoprolol dose, but increased to 25 mg after seeing Dr Fernandes.    Inconsistently taking Crestor due to muscle aching.    Following with Dr Ray for low grade prostate cancer with stable PSAs. Able to void acceptably. Has not been treated for prostate cancer. Stopped Detrol, it did not seem to make a difference on voiding.      Reviewed and updated as needed this visit by clinical staff  Tobacco  Allergies  Meds  Med Hx  Surg Hx  Fam Hx  Soc Hx        Reviewed and updated as needed this visit by Provider  Tobacco  Allergies  Meds  Problems  Med Hx  Surg Hx  Fam Hx        Social History     Tobacco Use     Smoking status: Former Smoker     Packs/day: 1.00     Years: 30.00     Pack years: 30.00     Types: Cigarettes     Last attempt to quit: 1990     Years since quittin.1     Smokeless tobacco: Never Used     Tobacco comment: Quit smoking: quit 7 years ago   Substance Use Topics     Alcohol use: Yes     Alcohol/week: 3.0 standard drinks     Comment: Alcoholic Drinks/day: weekly                           Current providers sharing in care for this patient include:   Patient Care Team:  No Ref-Primary, Physician as PCP - General  Magdaleno Palmer MD as Assigned PCP    The following health maintenance items are reviewed in Epic and correct as of today:  Health Maintenance   Topic Date Due      MEDICARE ANNUAL WELLNESS VISIT  2008     PHQ-2  2020     FALL RISK ASSESSMENT  2020     LIPID  2023     DTAP/TDAP/TD IMMUNIZATION (3 - Td) 2023     ADVANCE CARE PLANNING  2023     COLONOSCOPY  2028     INFLUENZA VACCINE  Completed     PNEUMOCOCCAL IMMUNIZATION 65+ LOW/MEDIUM RISK  Completed     ZOSTER IMMUNIZATION  Completed     IPV IMMUNIZATION  Aged Out     MENINGITIS IMMUNIZATION  Aged Out     Patient Active Problem List   Diagnosis     Benign nodular prostatic hyperplasia with lower urinary tract symptoms     Coronary artery disease involving native coronary artery of native heart without angina pectoris     Mixed hyperlipidemia     ED (erectile dysfunction)     Prostate cancer, low risk, on active surveillance     Ascending aortic aneurysm at 4.1 cm on 18     Bicuspid aortic valve     Obesity     Benign essential hypertension     Hypertrophic cardiomyopathy (H)     Moderate/Severe aortic stenosis     Asymmetric septal hypertrophy (H)     History of cardiac catheterization on 14     H/O heart artery stent on 14 to his LAD     Past Surgical History:   Procedure Laterality Date     COLONOSCOPY N/A 2018    Tubular adenoma, no follow up due to age     HERNIA REPAIR      ,HERNIA REPAIR     STENT,CORONARY, S660 2014    Cor angio: 90% prox LAD; s/p ZAYNAB x 1 to prox LAD with PTCA of diagonal 1 that was jailed due to plaque shifting, EF 65%       Social History     Tobacco Use     Smoking status: Former Smoker     Packs/day: 1.00     Years: 30.00     Pack years: 30.00     Types: Cigarettes     Last attempt to quit: 1990     Years since quittin.1     Smokeless tobacco: Never Used     Tobacco comment: Quit smoking: quit 7 years ago   Substance Use Topics     Alcohol use: Yes     Alcohol/week: 3.0 standard drinks     Comment: Alcoholic Drinks/day: weekly     Family History   Problem Relation Age of Onset     Arthritis Mother         " Arthritis     Other - See Comments Mother 96        Stroke     Heart Disease Sister      Cancer Sister      Colon Cancer Paternal Aunt         Cancer-colon     Prostate Cancer Paternal Uncle 70        Cancer-prostate     Breast Cancer Sister          Current Outpatient Medications   Medication Sig Dispense Refill     aspirin EC 81 MG EC tablet Take 81 mg by mouth       losartan (COZAAR) 100 MG tablet Take 1 tablet (100 mg) by mouth daily 90 tablet 3     metoprolol succinate ER (TOPROL-XL) 25 MG 24 hr tablet Take 1 tablet (25 mg) by mouth daily 90 tablet 3     nitroGLYcerin (NITROSTAT) 0.4 MG sublingual tablet Place 1 tablet (0.4 mg) under the tongue every 5 minutes as needed for chest pain 1 tablet under the tongue every 5 minutes for 3 doses. 25 tablet 3     rosuvastatin (CRESTOR) 20 MG tablet Take 1 tablet (20 mg) by mouth At Bedtime 90 tablet 3     Allergies   Allergen Reactions     Diatrizoate Rash     Clopidogrel Rash       ROS:  General: Denies general constitutional problems  Eyes: Denies problems  Ears/Nose/Throat: Denies problems  Cardiovascular: Denies problems  Respiratory: Denies problems  Gastrointestinal: Denies problems  Genitourinary: see above   Musculoskeletal: Denies problems  Skin: Denies problems  Neurologic: Denies problems  Psychiatric: Denies problems      OBJECTIVE:   /72   Pulse 86   Temp 97.6  F (36.4  C) (Temporal)   Resp 16   Ht 1.75 m (5' 8.9\")   Wt 95.3 kg (210 lb 3.2 oz)   SpO2 96%   BMI 31.13 kg/m   Estimated body mass index is 31.13 kg/m  as calculated from the following:    Height as of this encounter: 1.75 m (5' 8.9\").    Weight as of this encounter: 95.3 kg (210 lb 3.2 oz).  EXAM:   General Appearance: Pleasant, alert, appropriate appearance for age. No acute distress  Eye Exam:  Normal external eye, conjunctiva, lids, cornea. LINDA.  Ear Exam: Normal TM's bilaterally. Normal auditory canals and external ears.  OroPharynx Exam:  Dental hygiene adequate. Normal buccal " mucosa. Normal pharynx.  Neck Exam:  Supple, no masses or nodes. No carotid bruits.  Thyroid Exam: No nodules or enlargement.  Chest/Respiratory Exam: Normal chest wall and respirations. Clear to auscultation.  Cardiovascular Exam: Regular rate and rhythm. 2/6 SIN  Gastrointestinal Exam: Soft, non-tender, no masses or organomegaly.  Musculoskeletal Exam: Back is straight and non-tender, full ROM of upper and lower extremities.  Foot Exam: Left and right foot: good pedal pulses.  Skin: no rash or abnormalities  Neurologic Exam: Nonfocal, symmetric DTRs, normal gross motor, tone coordination and no tremor.  Psychiatric Exam: Alert and oriented - appropriate affect.      Results for orders placed or performed in visit on 02/24/20   Vitamin D Total     Status: None   Result Value Ref Range    Vitamin D Total 16.0 ng/mL   CBC W PLT No Diff     Status: None   Result Value Ref Range    WBC 5.6 4.0 - 11.0 10e9/L    RBC Count 4.72 4.4 - 5.9 10e12/L    Hemoglobin 14.6 13.3 - 17.7 g/dL    Hematocrit 43.7 40.0 - 53.0 %    MCV 93 78 - 100 fl    MCH 30.9 26.5 - 33.0 pg    MCHC 33.4 31.5 - 36.5 g/dL    RDW 12.7 10.0 - 15.0 %    Platelet Count 189 150 - 450 10e9/L   Comprehensive Metabolic Panel     Status: Abnormal   Result Value Ref Range    Sodium 140 134 - 144 mmol/L    Potassium 4.0 3.5 - 5.1 mmol/L    Chloride 101 98 - 107 mmol/L    Carbon Dioxide 31 21 - 31 mmol/L    Anion Gap 8 3 - 14 mmol/L    Glucose 114 (H) 70 - 105 mg/dL    Urea Nitrogen 24 7 - 25 mg/dL    Creatinine 1.43 (H) 0.70 - 1.30 mg/dL    GFR Estimate 48 (L) >60 mL/min/[1.73_m2]    GFR Estimate If Black 58 (L) >60 mL/min/[1.73_m2]    Calcium 9.8 8.6 - 10.3 mg/dL    Bilirubin Total 0.7 0.3 - 1.0 mg/dL    Albumin 4.7 3.5 - 5.7 g/dL    Protein Total 7.4 6.4 - 8.9 g/dL    Alkaline Phosphatase 82 34 - 104 U/L    ALT 19 7 - 52 U/L    AST 19 13 - 39 U/L   Lipid Panel     Status: Abnormal   Result Value Ref Range    Cholesterol 145 <200 mg/dL    Triglycerides 202 (H)  <150 mg/dL    HDL Cholesterol 44 23 - 92 mg/dL    LDL Cholesterol Calculated 61 <100 mg/dL    Non HDL Cholesterol 101 <130 mg/dL        ASSESSMENT / PLAN:       ICD-10-CM    1. Encounter for Medicare annual wellness exam Z00.00    2. Benign essential hypertension I10 metoprolol succinate ER (TOPROL-XL) 25 MG 24 hr tablet     losartan (COZAAR) 100 MG tablet     Comprehensive Metabolic Panel     CBC W PLT No Diff     CBC W PLT No Diff     Comprehensive Metabolic Panel   3. Coronary artery disease involving native coronary artery of native heart without angina pectoris I25.10 rosuvastatin (CRESTOR) 20 MG tablet     Lipid Panel     Lipid Panel   4. Mixed hyperlipidemia E78.2 rosuvastatin (CRESTOR) 20 MG tablet     Lipid Panel     Comprehensive Metabolic Panel     Comprehensive Metabolic Panel     Lipid Panel   5. Vitamin D insufficiency E55.9 Vitamin D Total     Vitamin D Total     He is due for labs for monitoring of blood pressure medications.  Creatinine returned elevated from baseline of 0.9-1.  This could be from losartan, mild dehydration, or urinary retention.  Given underlying low-grade prostate cancer and potential for urinary obstruction, we should consider renal ultrasound.  Georgina later sent a [employer] message requesting the ultrasound.    Refilled losartan and metoprolol.    He has not been consistently taking rosuvastatin.  We discussed that taking a statin as much as possible would be his best treatment to prevent future heart attack.  Certainly he could consider PCSK9 inhibitor, but there is lack of long-term proof and statin would be better.  Vitamin D checked and returned insufficient.  Potentially supplementation with vitamin D3 2000 units daily for a number of months will improve his level enough where he tolerates a statin better.    COUNSELING:  Reviewed preventive health counseling, as reflected in patient instructions       Regular exercise       Healthy diet/nutrition       Dental care        "Immunizations    current       Aspirin Prophylaxsis - for CAD       Colon cancer screening - complete due to age       Prostate cancer screening - with urology    Estimated body mass index is 31.13 kg/m  as calculated from the following:    Height as of this encounter: 1.75 m (5' 8.9\").    Weight as of this encounter: 95.3 kg (210 lb 3.2 oz).    Normal BMI for age     reports that he quit smoking about 30 years ago. His smoking use included cigarettes. He has a 30.00 pack-year smoking history. He has never used smokeless tobacco.      Appropriate preventive services were discussed with this patient, including applicable screening as appropriate for cardiovascular disease, diabetes, osteopenia/osteoporosis, and glaucoma.  As appropriate for age/gender, discussed screening for colorectal cancer, prostate cancer, breast cancer, and cervical cancer. Checklist reviewing preventive services available has been given to the patient.    Reviewed patients plan of care and provided an AVS. The Basic Care Plan (routine screening as documented in Health Maintenance) for Praveen meets the Care Plan requirement. This Care Plan has been established and reviewed with the Patient.    Counseling Resources:  ATP IV Guidelines  Pooled Cohorts Equation Calculator  Breast Cancer Risk Calculator  FRAX Risk Assessment  ICSI Preventive Guidelines  Dietary Guidelines for Americans, 2010  USDA's MyPlate  ASA Prophylaxis  Lung CA Screening    Yan Zayas MD  [facility] AND [facility]  "

## 2020-02-24 NOTE — NURSING NOTE
Pt presents to clinic today for annual physical.      Medication Reconciliation: complete  Ashley Deng LPN

## 2020-03-04 ENCOUNTER — HOSPITAL ENCOUNTER (OUTPATIENT)
Dept: ULTRASOUND IMAGING | Facility: OTHER | Age: 75
Discharge: HOME | End: 2020-03-04
Attending: FAMILY MEDICINE | Admitting: FAMILY MEDICINE
Payer: COMMERCIAL

## 2020-03-04 DIAGNOSIS — R79.89 ELEVATED SERUM CREATININE: ICD-10-CM

## 2020-03-04 DIAGNOSIS — N40.1 BENIGN NODULAR PROSTATIC HYPERPLASIA WITH LOWER URINARY TRACT SYMPTOMS: ICD-10-CM

## 2020-03-04 PROCEDURE — 76770 US EXAM ABDO BACK WALL COMP: CPT

## 2020-06-04 ENCOUNTER — OFFICE VISIT (OUTPATIENT)
Dept: CARDIOLOGY | Facility: OTHER | Age: 75
End: 2020-06-04
Attending: INTERNAL MEDICINE
Payer: COMMERCIAL

## 2020-06-04 VITALS
BODY MASS INDEX: 31.84 KG/M2 | SYSTOLIC BLOOD PRESSURE: 138 MMHG | HEIGHT: 69 IN | DIASTOLIC BLOOD PRESSURE: 80 MMHG | HEART RATE: 60 BPM | WEIGHT: 215 LBS | TEMPERATURE: 96.5 F | OXYGEN SATURATION: 96 % | RESPIRATION RATE: 18 BRPM

## 2020-06-04 DIAGNOSIS — I25.10 CORONARY ARTERY DISEASE INVOLVING NATIVE CORONARY ARTERY OF NATIVE HEART WITHOUT ANGINA PECTORIS: ICD-10-CM

## 2020-06-04 DIAGNOSIS — Z95.5 H/O HEART ARTERY STENT: ICD-10-CM

## 2020-06-04 DIAGNOSIS — I35.0 MODERATE AORTIC STENOSIS: Primary | ICD-10-CM

## 2020-06-04 DIAGNOSIS — I71.21 ASCENDING AORTIC ANEURYSM (H): ICD-10-CM

## 2020-06-04 DIAGNOSIS — I51.7 ASYMMETRIC SEPTAL HYPERTROPHY: ICD-10-CM

## 2020-06-04 DIAGNOSIS — I10 BENIGN ESSENTIAL HYPERTENSION: ICD-10-CM

## 2020-06-04 DIAGNOSIS — E78.2 MIXED HYPERLIPIDEMIA: ICD-10-CM

## 2020-06-04 DIAGNOSIS — I42.2 HYPERTROPHIC CARDIOMYOPATHY (H): ICD-10-CM

## 2020-06-04 DIAGNOSIS — Z98.890 HISTORY OF CARDIAC CATHETERIZATION: ICD-10-CM

## 2020-06-04 DIAGNOSIS — Q23.81 BICUSPID AORTIC VALVE: ICD-10-CM

## 2020-06-04 PROCEDURE — 99215 OFFICE O/P EST HI 40 MIN: CPT | Performed by: INTERNAL MEDICINE

## 2020-06-04 PROCEDURE — G0463 HOSPITAL OUTPT CLINIC VISIT: HCPCS | Performed by: INTERNAL MEDICINE

## 2020-06-04 RX ORDER — LOSARTAN POTASSIUM 100 MG/1
100 TABLET ORAL DAILY
Qty: 90 TABLET | Refills: 3 | Status: SHIPPED | OUTPATIENT
Start: 2020-06-04 | End: 2021-06-03

## 2020-06-04 RX ORDER — ASPIRIN 81 MG/1
81 TABLET, CHEWABLE ORAL DAILY
Qty: 90 TABLET | Refills: 3 | Status: SHIPPED | OUTPATIENT
Start: 2020-06-04 | End: 2022-05-11

## 2020-06-04 RX ORDER — NITROGLYCERIN 0.4 MG/1
0.4 TABLET SUBLINGUAL EVERY 5 MIN PRN
Qty: 25 TABLET | Refills: 3 | Status: SHIPPED | OUTPATIENT
Start: 2020-06-04 | End: 2022-02-09

## 2020-06-04 RX ORDER — METOPROLOL SUCCINATE 25 MG/1
25 TABLET, EXTENDED RELEASE ORAL DAILY
Qty: 90 TABLET | Refills: 3 | Status: SHIPPED | OUTPATIENT
Start: 2020-06-04 | End: 2021-08-23

## 2020-06-04 RX ORDER — ROSUVASTATIN CALCIUM 20 MG/1
20 TABLET, COATED ORAL AT BEDTIME
Qty: 90 TABLET | Refills: 3 | Status: SHIPPED | OUTPATIENT
Start: 2020-06-04 | End: 2022-01-11

## 2020-06-04 ASSESSMENT — MIFFLIN-ST. JEOR: SCORE: 1693.99

## 2020-06-04 ASSESSMENT — PAIN SCALES - GENERAL: PAINLEVEL: NO PAIN (0)

## 2020-06-04 NOTE — PATIENT INSTRUCTIONS
You were seen by  David Fernandes, DO     1. A Echocardiogram has been ordered now and then again in 6 months. You will be called to schedule this. You will receive instructions for testing at that time. You will be contacted with results.       2. Medications refills to [facility] pharmacy.    3. No other changes at this time.        You will follow up with [facility] in 6 months after echocardiogram is completed , sooner if needed.       Please call the cardiology office with problems, questions, or concerns at [phone].    If you experience chest pain, chest pressure, chest tightness, shortness of breath, fainting, lightheadedness, nausea, vomiting, or other concerning symptoms, please report to the Emergency Department or call 911. These symptoms may be emergent, and best treated in the Emergency Department.     Cardiology Nurses  AISHA Lin, DAT BLANDON LPN  [facility] ([address])  [phone]

## 2020-06-04 NOTE — PROGRESS NOTES
[facility] HEART [facility]   CARDIOLOGY PROGRESS NOTE     Chief Complaint   Patient presents with     Follow Up     6 months          Diagnosis:  1.  Moderate/severe AS 2/2 bicuspid aortic valve as noted on his echo from 12/18/19.  2.  Bicuspid aortic valve.  3.  TAAA at 4.1 cm on 12/18/19.  4.  Asymmetric hypertrophy of the septum.  5.  H/O cardiac cath on 12/22/14 with stenting to his LAD with a 3.5 mm x 15 mm ZAYNAB.  6.  Hyperlipidemia-controlled.  7.  Obesity.  8.  Hypertension-controlled.    Assessment/Plan:    1.  He will have a repeat echo in the next few months.  His last echo was 12/18/2019.  He is being followed for moderate to severe aortic stenosis and a mild ascending aortic aneurysm.  2.  Echo in 6 months.  3.  Refill of his medications.  This includes aspirin 81 mg daily, losartan 100 mg daily, metoprolol 25 mg XL daily, sublingual nitro as needed for chest pain, and Crestor 20 mg at bedtime.  4.  Follow-up in 6 months after completion of his echocardiogram unless his more recent echo is abnormal.      Interval history:  Praveen is doing well.  Reviewed his echo from 12/18/2019.  He is followed for moderate to severe aortic stenosis and a mild ascending aortic aneurysm.  He has a history of a bicuspid aortic valve.  His mean gradient, peak velocity, and his aortic valve area were moderate.  However, his dimensionless index was in the severe range of 0.23.  He remains asymptomatic denying shortness of breath, heart failure, chest pain, or syncope.  We will plan for repeat echocardiogram every 6 months.      HPI:    He had last seen Dr. Antoine on 2/15/17. At that time, it was recommended he have a yearly follow-up secondary to coronary artery disease and stent placement. He had a stress test on 11/25/14 that showed concerns for wall motion abnormalities. He went on to have an angiogram which showed a 90% stenosis to his LAD. He is status post ZAYNAB with a 3.5 mm x 15 mm stent to his LAD. Following the procedure, he  developed a rash. It was uncertain if his rash was secondary to contrast or secondary to Plavix. He was switched to prasugrel. With these changes, he did not have a recurrence of his rash.  He has been struggling without issues.     Previously, he had a stress echo on 11/25/14. He was noted have wall motion abnormalities. In addition, he was noted to have mild mitral and tricuspid regurgitation.  There were concerns for a bicuspid aortic valve, mild aortic stenosis, and concerns for an ascending aortic aneurysm.  It was suggested that he should have first-degree relatives checked. He is aware.     He is also being treated for hypertension which is presently controlled.     He has a history of hyperlipidemia. He is currently on Crestor 20 mg daily.  His cholesterol has been controlled.     He is being followed secondary to moderate/severe aortic stenosis with a bicuspid aortic valve and a mild ascending aortic aneurysm.  His echo from 2/23/2018 showed an ascending aortic aneurysm at 3.8 cm. On 12/18/19, this vessel was 4.1 cm but on 6/20/19 at 4.0 cm. His echo results were discussed on 6/6/2019 with suggestion of a repeat echo in 6 months related to this accelerated enlargement of his TAAA.     He was seen for surgical risk stratification by his primary on 9/5/18.  He had an echocardiogram with findings of worsening of his aortic valve stenosis. Also, he was noted to have a mild ascending aortic aneurysm at 3.8 cm. His aortic valve was moderately stenosed. It was thickening of the anterior basal septum. He remains asymptomatic and has no specific complaints today.      Relevant testing:  ECHO on 12/18/19:  Mild concentric wall thickening consistent with left ventricular hypertrophy  is present. Thickening of the anterior basal septum. Global and regional left  ventricular function is normal with an EF of 55-60%.  Right ventricular function, chamber size, wall motion, and thickness are  normal.  Moderate to severe  aortic stenosis is present. Peak velocity (3.5m/s) and mean  gradient (33mmHg) are in the moderate range but the DI (0.23) is in the severe  range.  Pulmonary artery systolic pressure is normal.  Ascending aorta 4.1 cm.  IVC diameter <2.1 cm collapsing >50% with sniff suggests a normal RA pressure  of 3 mmHg.      Past Medical History:   Diagnosis Date     Atherosclerotic heart disease of native coronary artery without angina pectoris     12/2/2014,- 12/2/14 Cor angio: 90% prox LAD; s/p ZAYNAB x 1 to prox LAD with PTCA of diagonal 1 that was jailed due to plaque shifting, EF 65%     Essential (primary) hypertension     No Comments Provided     Lyme disease     7/26/2015     Malignant neoplasm of prostate (H)     No Comments Provided     Pain in knee     No Comments Provided     PVC's (premature ventricular contractions) 2/15/2017     Stented coronary artery 2/1/2018     Unilateral inguinal hernia without obstruction or gangrene     9/12/2012       Past Surgical History:   Procedure Laterality Date     COLONOSCOPY N/A 9/21/2018    Tubular adenoma, no follow up due to age     HERNIA REPAIR      ,HERNIA REPAIR     STENT,CORONARY, S660 24/30 12/02/2014    Cor angio: 90% prox LAD; s/p ZAYNAB x 1 to prox LAD with PTCA of diagonal 1 that was jailed due to plaque shifting, EF 65%       Allergies   Allergen Reactions     Diatrizoate Rash     Clopidogrel Rash       Current Outpatient Medications   Medication Sig Dispense Refill     aspirin (ASA) 81 MG chewable tablet Take 1 tablet (81 mg) by mouth daily 90 tablet 3     diphenhydrAMINE-acetaminophen (TYLENOL PM)  MG tablet Take 1 tablet by mouth nightly as needed for sleep       losartan (COZAAR) 100 MG tablet Take 1 tablet (100 mg) by mouth daily 90 tablet 3     metoprolol succinate ER (TOPROL-XL) 25 MG 24 hr tablet Take 1 tablet (25 mg) by mouth daily 90 tablet 3     nitroGLYcerin (NITROSTAT) 0.4 MG sublingual tablet Place 1 tablet (0.4 mg) under the tongue every 5  minutes as needed for chest pain 1 tablet under the tongue every 5 minutes for 3 doses. 25 tablet 3     rosuvastatin (CRESTOR) 20 MG tablet Take 1 tablet (20 mg) by mouth At Bedtime 90 tablet 3       Social History     Socioeconomic History     Marital status:      Spouse name: Not on file     Number of children: Not on file     Years of education: Not on file     Highest education level: Not on file   Occupational History     Not on file   Social Needs     Financial resource strain: Not on file     Food insecurity     Worry: Not on file     Inability: Not on file     Transportation needs     Medical: Not on file     Non-medical: Not on file   Tobacco Use     Smoking status: Former Smoker     Packs/day: 1.00     Years: 30.00     Pack years: 30.00     Types: Cigarettes     Last attempt to quit: 1990     Years since quittin.4     Smokeless tobacco: Never Used     Tobacco comment: Quit smoking: quit 7 years ago   Substance and Sexual Activity     Alcohol use: Yes     Alcohol/week: 3.0 standard drinks     Comment: Alcoholic Drinks/day: wine every other day     Drug use: No     Comment: Drug use: Not Asked     Sexual activity: Yes     Partners: Female   Lifestyle     Physical activity     Days per week: Not on file     Minutes per session: Not on file     Stress: Not on file   Relationships     Social connections     Talks on phone: Not on file     Gets together: Not on file     Attends Orthodoxy service: Not on file     Active member of club or organization: Not on file     Attends meetings of clubs or organizations: Not on file     Relationship status: Not on file     Intimate partner violence     Fear of current or ex partner: Not on file     Emotionally abused: Not on file     Physically abused: Not on file     Forced sexual activity: Not on file   Other Topics Concern     Parent/sibling w/ CABG, MI or angioplasty before 65F 55M? Not Asked   Social History Narrative    [employer]ail [employer] business.  [employer].   Quit smoking around 1995.    Drinks once or twice a week per year.  .  Three daughters.  6 grandkids.  One daughter in [place] -  to Dr. Rodriguez.  Two daughters and families in [place].       LAB RESULTS:   No visits with results within 2 Month(s) from this visit.   Latest known visit with results is:   Office Visit on 02/24/2020   Component Date Value Ref Range Status     Vitamin D Total 02/24/2020 16.0  ng/mL Final     WBC 02/24/2020 5.6  4.0 - 11.0 10e9/L Final     RBC Count 02/24/2020 4.72  4.4 - 5.9 10e12/L Final     Hemoglobin 02/24/2020 14.6  13.3 - 17.7 g/dL Final     Hematocrit 02/24/2020 43.7  40.0 - 53.0 % Final     MCV 02/24/2020 93  78 - 100 fl Final     MCH 02/24/2020 30.9  26.5 - 33.0 pg Final     MCHC 02/24/2020 33.4  31.5 - 36.5 g/dL Final     RDW 02/24/2020 12.7  10.0 - 15.0 % Final     Platelet Count 02/24/2020 189  150 - 450 10e9/L Final     Sodium 02/24/2020 140  134 - 144 mmol/L Final     Potassium 02/24/2020 4.0  3.5 - 5.1 mmol/L Final     Chloride 02/24/2020 101  98 - 107 mmol/L Final     Carbon Dioxide 02/24/2020 31  21 - 31 mmol/L Final     Anion Gap 02/24/2020 8  3 - 14 mmol/L Final     Glucose 02/24/2020 114* 70 - 105 mg/dL Final     Urea Nitrogen 02/24/2020 24  7 - 25 mg/dL Final     Creatinine 02/24/2020 1.43* 0.70 - 1.30 mg/dL Final     GFR Estimate 02/24/2020 48* >60 mL/min/[1.73_m2] Final     GFR Estimate If Black 02/24/2020 58* >60 mL/min/[1.73_m2] Final     Calcium 02/24/2020 9.8  8.6 - 10.3 mg/dL Final     Bilirubin Total 02/24/2020 0.7  0.3 - 1.0 mg/dL Final     Albumin 02/24/2020 4.7  3.5 - 5.7 g/dL Final     Protein Total 02/24/2020 7.4  6.4 - 8.9 g/dL Final     Alkaline Phosphatase 02/24/2020 82  34 - 104 U/L Final     ALT 02/24/2020 19  7 - 52 U/L Final     AST 02/24/2020 19  13 - 39 U/L Final     Cholesterol 02/24/2020 145  <200 mg/dL Final     Triglycerides 02/24/2020 202* <150 mg/dL Final     HDL Cholesterol 02/24/2020 44  23 - 92 mg/dL Final     LDL  "Cholesterol Calculated 02/24/2020 61  <100 mg/dL Final     Non HDL Cholesterol 02/24/2020 101  <130 mg/dL Final        Review of systems: Negative except that which was noted in the HPI.    Physical examination:  /80 (BP Location: Right arm, Patient Position: Sitting, Cuff Size: Adult Large)   Pulse 60   Temp 96.5  F (35.8  C) (Tympanic)   Resp 18   Ht 1.75 m (5' 8.9\")   Wt 97.5 kg (215 lb)   SpO2 96%   BMI 31.84 kg/m      GENERAL APPEARANCE: healthy, alert and no distress  HEENT: no icterus, no xanthelasmas, normal pupil size and reaction, no cyanosis.  NECK: no adenopathy, no asymmetry, masses, or scars, no cervical bruits, JVP is not visible  CHEST: lungs clear to auscultation - no rales, rhonchi or wheezes, no use of accessory muscles, no retractions, respirations are unlabored, normal respiratory rate  CARDIOVASCULAR: regular rhythm, normal S1 with physiologic split S2, no S3 or S4, no click or rub.  Mid to late peaking systolic crescendo decrescendo murmur.  ABDOMEN: soft, non tender, bowel sounds normal  EXTREMITIES: no clubbing, cyanosis or edema  NEURO: alert and oriented normal speech, and affect  VASC: No vascular bruits heard.  SKIN: no ecchymoses, no rashes      Thank you for allowing me to participate in the care of your patient. Please do not hesitate to contact me if you have any questions.     David Fernandes, DO          "

## 2020-06-04 NOTE — NURSING NOTE
"Patient comes in for 6 month follow up. Jennifer Austin LPN ....................6/4/2020   10:19 AM  Chief Complaint   Patient presents with     Follow Up     6 months       Initial /80 (BP Location: Right arm, Patient Position: Sitting, Cuff Size: Adult Large)   Pulse 60   Temp 96.5  F (35.8  C) (Tympanic)   Resp 18   Ht 1.75 m (5' 8.9\")   Wt 97.5 kg (215 lb)   SpO2 96%   BMI 31.84 kg/m   Estimated body mass index is 31.84 kg/m  as calculated from the following:    Height as of this encounter: 1.75 m (5' 8.9\").    Weight as of this encounter: 97.5 kg (215 lb).  Meds Reconciled: complete  Pt is on Aspirin  Pt is on a Statin  PHQ and/or MIKEY reviewed. Pt referred to PCP/MH Provider as appropriate.    Jennifer Austin LPN      "

## 2020-06-18 ENCOUNTER — HOSPITAL ENCOUNTER (OUTPATIENT)
Dept: CARDIOLOGY | Facility: OTHER | Age: 75
Discharge: HOME | End: 2020-06-18
Attending: INTERNAL MEDICINE | Admitting: INTERNAL MEDICINE
Payer: COMMERCIAL

## 2020-06-18 DIAGNOSIS — I71.21 ASCENDING AORTIC ANEURYSM (H): ICD-10-CM

## 2020-06-18 DIAGNOSIS — I42.2 HYPERTROPHIC CARDIOMYOPATHY (H): ICD-10-CM

## 2020-06-18 DIAGNOSIS — I35.0 MODERATE AORTIC STENOSIS: ICD-10-CM

## 2020-06-18 DIAGNOSIS — Q23.81 BICUSPID AORTIC VALVE: ICD-10-CM

## 2020-06-18 DIAGNOSIS — I51.7 ASYMMETRIC SEPTAL HYPERTROPHY: ICD-10-CM

## 2020-06-18 DIAGNOSIS — I10 BENIGN ESSENTIAL HYPERTENSION: ICD-10-CM

## 2020-06-18 PROCEDURE — 93306 TTE W/DOPPLER COMPLETE: CPT

## 2020-06-18 PROCEDURE — 93306 TTE W/DOPPLER COMPLETE: CPT | Mod: 26 | Performed by: INTERNAL MEDICINE

## 2020-06-24 ENCOUNTER — TELEPHONE (OUTPATIENT)
Dept: CARDIOLOGY | Facility: OTHER | Age: 75
End: 2020-06-24

## 2020-06-24 NOTE — TELEPHONE ENCOUNTER
After verification of name and date of birth, patient notified of results per provider. Patient verbalizes understanding and has no further questions or concerns.  Taylor Orantes RN on 6/24/2020 at 1:54 PM      Cristal Klein APRN CNP Green, Glenda M, RN    Caller: Unspecified (Today, 10:01 AM)               No changes in comparison to prior echo. His asymmetrical septal hypertrophy is unchanged and aortic valve remains moderately to severely narrowed without change. If he remains asymptomatic then repeat echo again in 6 months for continued monitoring of this.   Thanks,   MACY Thornton CNP

## 2020-06-24 NOTE — TELEPHONE ENCOUNTER
Called patient to let him know will have MACY Jackson CNP review his Echo and will call with results when available.  Patient verbalized understanding.  Taylor Orantes RN on 6/24/2020 at 10:15 AM

## 2020-06-24 NOTE — TELEPHONE ENCOUNTER
Patient is looking for his ECHO results from 06/18/2020.      Please call patient at [phone]    Kathleen Aldrich on 6/24/2020 at 10:02 AM

## 2020-06-27 DIAGNOSIS — I51.7 ASYMMETRIC SEPTAL HYPERTROPHY: ICD-10-CM

## 2020-06-27 DIAGNOSIS — Q23.81 BICUSPID AORTIC VALVE: ICD-10-CM

## 2020-06-27 DIAGNOSIS — I35.0 MODERATE AORTIC STENOSIS: ICD-10-CM

## 2020-06-27 DIAGNOSIS — I42.2 HYPERTROPHIC CARDIOMYOPATHY (H): ICD-10-CM

## 2020-06-27 DIAGNOSIS — I10 BENIGN ESSENTIAL HYPERTENSION: ICD-10-CM

## 2020-06-27 DIAGNOSIS — I71.21 ASCENDING AORTIC ANEURYSM (H): Primary | ICD-10-CM

## 2020-07-08 DIAGNOSIS — C61 PROSTATE CANCER (H): Primary | ICD-10-CM

## 2020-07-08 NOTE — PROGRESS NOTES
2/3/20  Plan  Continue active surveillance for prostate cancer with PSA q6 months  Hold Detrol and if no worsening urinary sx then discontinue.

## 2020-08-03 ENCOUNTER — OFFICE VISIT (OUTPATIENT)
Dept: UROLOGY | Facility: OTHER | Age: 75
End: 2020-08-03
Attending: UROLOGY
Payer: COMMERCIAL

## 2020-08-03 VITALS
BODY MASS INDEX: 32.91 KG/M2 | DIASTOLIC BLOOD PRESSURE: 80 MMHG | WEIGHT: 222.2 LBS | RESPIRATION RATE: 16 BRPM | HEART RATE: 72 BPM | SYSTOLIC BLOOD PRESSURE: 130 MMHG

## 2020-08-03 DIAGNOSIS — C61 PROSTATE CANCER (H): ICD-10-CM

## 2020-08-03 DIAGNOSIS — C61 PROSTATE CANCER (H): Primary | ICD-10-CM

## 2020-08-03 LAB — PSA SERPL-MCNC: 4.15 NG/ML

## 2020-08-03 PROCEDURE — 36415 COLL VENOUS BLD VENIPUNCTURE: CPT | Mod: ZL | Performed by: UROLOGY

## 2020-08-03 PROCEDURE — G0463 HOSPITAL OUTPT CLINIC VISIT: HCPCS

## 2020-08-03 PROCEDURE — 99213 OFFICE O/P EST LOW 20 MIN: CPT | Performed by: UROLOGY

## 2020-08-03 PROCEDURE — 84153 ASSAY OF PSA TOTAL: CPT | Mod: ZL | Performed by: UROLOGY

## 2020-08-03 ASSESSMENT — PAIN SCALES - GENERAL: PAINLEVEL: NO PAIN (0)

## 2020-08-03 NOTE — PROGRESS NOTES
Type of Visit  EST    Chief Complaint  Prostate cancer on active surveillance  Urinary urgency and frequency    HPI  Mr. Lovell is a 75 y.o. male with clinical low risk prostate cancer (since 2013) on active surveillance.   He reports no changes in health in the last 6 months.  The patient was previously taking Detrol for irritative urinary symptoms however I recommended he discontinue which he has done.  He decreased daily caffeine intake as his wife has transition to decaffeinated coffee due to A. Fib.  He has noticed a significant improvement in his symptoms.    Regarding his prostate cancer management that was diagnosed 7 years ago the patient underwent PSA earlier today.  Patient denies unintentional weight loss, bone or pelvic pain.      Review of Systems  I reviewed the ROS the patient today.    Nursing Notes:   Teresa Douglas LPN  8/3/2020  1:36 PM  Signed  Pt presents to clinic for a six month follow up    Review of Systems:    Weight loss:    No     Recent fever/chills:  No   Night sweats:   No  Current skin rash:  No   Recent hair loss:  No  Heat intolerance:  No   Cold intolerance:  No  Chest pain:   No   Palpitations:   No  Shortness of breath:  No   Wheezing:   No  Constipation:    No   Diarrhea:   No   Nausea:   No   Vomiting:   No   Kidney/side pain:  No   Back pain:   No  Frequent headaches:  No   Dizziness:     No  Leg swelling:   No   Calf pain:    No      Physical Exam  /80 (BP Location: Right arm, Patient Position: Sitting, Cuff Size: Adult Regular)   Pulse 72   Resp 16   Wt 100.8 kg (222 lb 3.2 oz)   BMI 32.91 kg/m    Constitutional: NAD, WDWN.  Cardiovascular: Regular rate.  Pulmonary/Chest: Respirations are even and non-labored bilaterally.  Abdominal: Soft. No distension, tenderness, masses or guarding. No CVA tenderness.  Extremities: GLORIA x 4, Warm. No clubbing.  No cyanosis.    Skin: Pink, warm and dry.  No rashes noted.  Genitourinary:  Nonpalpable bladder  Prostate:  40  grams.  Symmetric, non-tender, and no induration.      Labs  Results for LIBORIO LOVELL (MRN [id number]) as of 8/3/2020 13:36   8/3/2020 09:53   PSA 4.154 (H)     Results for LIBORIO LOVELL (MRN [id number]) as of 2/3/2020 13:22   2/3/2020 09:52   Prostate Specific Antigen 4.744 (H)     Results for LIBORIO LOVELL (MRN [id number]) as of 7/29/2019 12:00   2/18/2019 09:51 7/29/2019 08:24   Prostate Specific Antigen 4.337 (H) 3.869 (H)     Results for LIBORIO LOVELL (MRN [id number]) as of 8/24/2018 11:26   7/25/2018 11:17   Prostate Specific Antigen 4.908 (H)     Results for LIBORIO LOVELL (MRN [id number]) as of 1/15/2018 10:05   9/24/2014 11:10 3/25/2015 11:00 9/30/2015 10:51 12/16/2015 10:00 7/22/2016 09:10 1/26/2017 08:02 7/20/2017 08:51 1/12/2018 11:00   PSA TOTAL  2.450 2.680 3.389 (H) 3.053 2.958 3.345 (H) 3.225 (H) 3.585 (H)     Pathology  12/16/2015  Grade 3+3=6 prostate cancer in 2/12 cores (80%)    10/9/2013  Grade 3+3=6 prostate cancer in 2/12 cores (15-20%)    Oncotype Dx    (from the 10/2013 specimen)  GPS:     24  Likelihood of favorable pathology: 61%    PSA:       3.389  Calculated prostate volume based on TRUS: 56 grams  PSA Density:      0.06 ng/mL/g  T stage:      cT2a    Assessment  Mr. Lvoell is a 75 y.o. male with initial low risk prostate cancer (cT2a) on active surveillance.  PSA continues to be stable.    Plan  Continue active surveillance for prostate cancer with PSA q6 months

## 2020-08-03 NOTE — NURSING NOTE
Pt presents to clinic for a six month follow up    Review of Systems:    Weight loss:    No     Recent fever/chills:  No   Night sweats:   No  Current skin rash:  No   Recent hair loss:  No  Heat intolerance:  No   Cold intolerance:  No  Chest pain:   No   Palpitations:   No  Shortness of breath:  No   Wheezing:   No  Constipation:    No   Diarrhea:   No   Nausea:   No   Vomiting:   No   Kidney/side pain:  No   Back pain:   No  Frequent headaches:  No   Dizziness:     No  Leg swelling:   No   Calf pain:    No

## 2020-12-16 ENCOUNTER — HOSPITAL ENCOUNTER (OUTPATIENT)
Dept: CARDIOLOGY | Facility: OTHER | Age: 75
Discharge: HOME | End: 2020-12-16
Attending: INTERNAL MEDICINE | Admitting: INTERNAL MEDICINE
Payer: COMMERCIAL

## 2020-12-16 DIAGNOSIS — Q23.81 BICUSPID AORTIC VALVE: ICD-10-CM

## 2020-12-16 DIAGNOSIS — I71.21 ASCENDING AORTIC ANEURYSM (H): ICD-10-CM

## 2020-12-16 DIAGNOSIS — I51.7 ASYMMETRIC SEPTAL HYPERTROPHY: ICD-10-CM

## 2020-12-16 DIAGNOSIS — I35.0 MODERATE AORTIC STENOSIS: ICD-10-CM

## 2020-12-16 DIAGNOSIS — I42.2 HYPERTROPHIC CARDIOMYOPATHY (H): ICD-10-CM

## 2020-12-16 DIAGNOSIS — I10 BENIGN ESSENTIAL HYPERTENSION: ICD-10-CM

## 2020-12-16 PROCEDURE — 93306 TTE W/DOPPLER COMPLETE: CPT | Mod: 26 | Performed by: STUDENT IN AN ORGANIZED HEALTH CARE EDUCATION/TRAINING PROGRAM

## 2020-12-16 PROCEDURE — 93306 TTE W/DOPPLER COMPLETE: CPT

## 2020-12-21 ENCOUNTER — TELEPHONE (OUTPATIENT)
Dept: CARDIOLOGY | Facility: OTHER | Age: 75
End: 2020-12-21

## 2020-12-21 NOTE — TELEPHONE ENCOUNTER
Patient would like results of his echocardiogram able to send result via [employer] per patient.  Taylor Orantes RN on 12/21/2020 at 3:52 PM    DWB-patient is looking for results from last visit   Please call and advise    Thank You    Judy Joshua on 12/21/2020 at 3:38 PM

## 2020-12-22 DIAGNOSIS — C61 PROSTATE CANCER (H): Primary | ICD-10-CM

## 2020-12-22 NOTE — PROGRESS NOTES
"Per last office visit  8/3/20 with Romel Ray MD \"Plan  Continue active surveillance for prostate cancer with PSA q6 months\"        Orders Placed    "

## 2020-12-22 NOTE — TELEPHONE ENCOUNTER
David Fernandes, Taylor Munson, RN   Caller: Unspecified (Yesterday,  3:38 PM)             Praveen was sent a Rain message.  He now has severe aortic stenosis.  It is at this point, that he may want to consider having his valve replaced.  I would consider having [facility] at [facility].  This was explained in his my chart message.  I told him to let us know if he would like to move forward with having his valve replaced.     Dr. Fernandes

## 2021-02-02 ENCOUNTER — TRANSFERRED RECORDS (OUTPATIENT)
Dept: HEALTH INFORMATION MANAGEMENT | Facility: OTHER | Age: 76
End: 2021-02-02

## 2021-02-03 ENCOUNTER — IMMUNIZATION (OUTPATIENT)
Dept: FAMILY MEDICINE | Facility: OTHER | Age: 76
End: 2021-02-03
Payer: COMMERCIAL

## 2021-02-03 PROCEDURE — 91300 PR COVID VAC PFIZER DIL RECON 30 MCG/0.3 ML IM: CPT

## 2021-02-04 ENCOUNTER — OFFICE VISIT (OUTPATIENT)
Dept: UROLOGY | Facility: OTHER | Age: 76
End: 2021-02-04
Attending: UROLOGY
Payer: COMMERCIAL

## 2021-02-04 VITALS
HEART RATE: 64 BPM | RESPIRATION RATE: 18 BRPM | WEIGHT: 212 LBS | SYSTOLIC BLOOD PRESSURE: 134 MMHG | BODY MASS INDEX: 31.4 KG/M2 | DIASTOLIC BLOOD PRESSURE: 72 MMHG

## 2021-02-04 DIAGNOSIS — R79.89 ELEVATED SERUM CREATININE: ICD-10-CM

## 2021-02-04 DIAGNOSIS — C61 PROSTATE CANCER (H): ICD-10-CM

## 2021-02-04 DIAGNOSIS — N40.1 BENIGN NODULAR PROSTATIC HYPERPLASIA WITH LOWER URINARY TRACT SYMPTOMS: ICD-10-CM

## 2021-02-04 DIAGNOSIS — C61 PROSTATE CANCER (H): Primary | ICD-10-CM

## 2021-02-04 LAB
ANION GAP SERPL CALCULATED.3IONS-SCNC: 5 MMOL/L (ref 3–14)
BUN SERPL-MCNC: 18 MG/DL (ref 7–25)
CALCIUM SERPL-MCNC: 9.2 MG/DL (ref 8.6–10.3)
CHLORIDE SERPL-SCNC: 104 MMOL/L (ref 98–107)
CO2 SERPL-SCNC: 29 MMOL/L (ref 21–31)
CREAT SERPL-MCNC: 1.1 MG/DL (ref 0.7–1.3)
GFR SERPL CREATININE-BSD FRML MDRD: 65 ML/MIN/{1.73_M2}
GLUCOSE SERPL-MCNC: 95 MG/DL (ref 70–105)
POTASSIUM SERPL-SCNC: 4.3 MMOL/L (ref 3.5–5.1)
PSA SERPL-MCNC: 4.55 NG/ML
SODIUM SERPL-SCNC: 138 MMOL/L (ref 134–144)

## 2021-02-04 PROCEDURE — 84153 ASSAY OF PSA TOTAL: CPT | Mod: ZL | Performed by: UROLOGY

## 2021-02-04 PROCEDURE — 80048 BASIC METABOLIC PNL TOTAL CA: CPT | Mod: ZL | Performed by: UROLOGY

## 2021-02-04 PROCEDURE — G0463 HOSPITAL OUTPT CLINIC VISIT: HCPCS | Performed by: UROLOGY

## 2021-02-04 PROCEDURE — 99213 OFFICE O/P EST LOW 20 MIN: CPT | Performed by: UROLOGY

## 2021-02-04 PROCEDURE — 36415 COLL VENOUS BLD VENIPUNCTURE: CPT | Mod: ZL | Performed by: UROLOGY

## 2021-02-04 ASSESSMENT — PAIN SCALES - GENERAL: PAINLEVEL: NO PAIN (0)

## 2021-02-04 NOTE — PROGRESS NOTES
Type of Visit  EST    Chief Complaint  Prostate cancer on active surveillance    HPI  Mr. Lovell is a 75 y.o. male with clinical low risk prostate cancer (since 2013) on active surveillance.   Since the last visit he has developed some additional issues with his aortic valve and has plans for replacement later next month.  He decreased daily caffeine intake as his wife has transition to decaffeinated coffee due to A. Fib.  He has noticed a significant improvement in his symptoms.  He denies dysuria, gross hematuria, pelvic pain.  He underwent a PSA earlier for active surveillance.  He follows up to review the results      Review of Systems  I reviewed the ROS the patient today.    Nursing Notes:   Aguilar Jacob RN  2/4/2021  1:45 PM  Signed  Review of Systems:    Weight loss:    No     Recent fever/chills:  No   Night sweats:   No  Current skin rash:  No   Recent hair loss:  No  Heat intolerance:  No   Cold intolerance:  No  Chest pain:   No   Palpitations:   No  Shortness of breath:  No   Wheezing:   No  Constipation:    No   Diarrhea:   No   Nausea:   No   Vomiting:   No   Kidney/side pain:  No   Back pain:   No  Frequent headaches:  No   Dizziness:     No  Leg swelling:   No   Calf pain:    No          Physical Exam  /72 (BP Location: Left arm, Patient Position: Sitting, Cuff Size: Adult Regular)   Pulse 64   Resp 18   Wt 96.2 kg (212 lb)   BMI 31.40 kg/m    Constitutional: NAD, WDWN.  Cardiovascular: Regular rate.  Pulmonary/Chest: Respirations are even and non-labored bilaterally.  Abdominal: Soft. No distension, tenderness, masses or guarding. No CVA tenderness.  Extremities: GLORIA x 4, Warm. No clubbing.  No cyanosis.    Skin: Pink, warm and dry.  No rashes noted.  Genitourinary:  Nonpalpable bladder  Prostate:  40 grams.  Symmetric, non-tender, and no induration.      Labs  Results for LIBORIO LOVELL (MRN [id number]) as of 2/4/2021 13:48   2/4/2021 10:58   PSA 4.551 (H)     Results for  LIBORIO LOVELL (MRN [id number]) as of 8/3/2020 13:36   8/3/2020 09:53   PSA 4.154 (H)     Results for LIBORIO LOVELL (MRN [id number]) as of 2/3/2020 13:22   2/3/2020 09:52   PSA 4.744 (H)     Results for LIBORIO LOVELL (MRN [id number]) as of 7/29/2019 12:00   2/18/2019 09:51 7/29/2019 08:24   PSA 4.337 (H) 3.869 (H)     Results for LIBORIO LOVELL (MRN [id number]) as of 8/24/2018 11:26   7/25/2018 11:17   PSA 4.908 (H)     Results for LIBORIO LOVELL (MRN [id number]) as of 1/15/2018 10:05   9/24/2014 11:10 3/25/2015 11:00   PSA 2.450 2.680     Pathology  12/16/2015  Grade 3+3=6 prostate cancer in 2/12 cores (80%)    10/9/2013  Grade 3+3=6 prostate cancer in 2/12 cores (15-20%)    Oncotype Dx    (from the 10/2013 specimen)  GPS:     24  Likelihood of favorable pathology: 61%    PSA:       3.389  Calculated prostate volume based on TRUS: 56 grams  PSA Density:      0.06 ng/mL/g  T stage:      cT2a    Assessment  Mr. Lovell is a 75 y.o. male with initial low risk prostate cancer (cT2a) on active surveillance.  Patient's PSA is stable.  We will continue active surveillance for prostate cancer    Plan  Continue active surveillance for prostate cancer with PSA q6 months

## 2021-02-04 NOTE — NURSING NOTE
Review of Systems:    Weight loss:    No     Recent fever/chills:  No   Night sweats:   No  Current skin rash:  No   Recent hair loss:  No  Heat intolerance:  No   Cold intolerance:  No  Chest pain:   No   Palpitations:   No  Shortness of breath:  No   Wheezing:   No  Constipation:    No   Diarrhea:   No   Nausea:   No   Vomiting:   No   Kidney/side pain:  No   Back pain:   No  Frequent headaches:  No   Dizziness:     No  Leg swelling:   No   Calf pain:    No

## 2021-02-08 ENCOUNTER — TRANSFERRED RECORDS (OUTPATIENT)
Dept: HEALTH INFORMATION MANAGEMENT | Facility: OTHER | Age: 76
End: 2021-02-08

## 2021-02-16 ENCOUNTER — MEDICAL CORRESPONDENCE (OUTPATIENT)
Dept: HEALTH INFORMATION MANAGEMENT | Facility: OTHER | Age: 76
End: 2021-02-16

## 2021-02-16 DIAGNOSIS — I35.0 NONRHEUMATIC AORTIC VALVE STENOSIS: Primary | ICD-10-CM

## 2021-02-16 LAB
ALBUMIN SERPL-MCNC: 4.4 G/DL (ref 3.5–5.7)
CREAT SERPL-MCNC: 1.08 MG/DL (ref 0.7–1.3)
GFR SERPL CREATININE-BSD FRML MDRD: 66 ML/MIN/{1.73_M2}

## 2021-02-16 PROCEDURE — 82565 ASSAY OF CREATININE: CPT | Mod: ZL

## 2021-02-16 PROCEDURE — 36415 COLL VENOUS BLD VENIPUNCTURE: CPT | Mod: ZL

## 2021-02-16 PROCEDURE — 82040 ASSAY OF SERUM ALBUMIN: CPT | Mod: ZL

## 2021-02-24 ENCOUNTER — IMMUNIZATION (OUTPATIENT)
Dept: FAMILY MEDICINE | Facility: OTHER | Age: 76
End: 2021-02-24
Attending: FAMILY MEDICINE
Payer: COMMERCIAL

## 2021-02-24 PROCEDURE — 91300 PR COVID VAC PFIZER DIL RECON 30 MCG/0.3 ML IM: CPT

## 2021-03-17 ENCOUNTER — TRANSFERRED RECORDS (OUTPATIENT)
Dept: HEALTH INFORMATION MANAGEMENT | Facility: OTHER | Age: 76
End: 2021-03-17

## 2021-03-18 ENCOUNTER — TRANSFERRED RECORDS (OUTPATIENT)
Dept: HEALTH INFORMATION MANAGEMENT | Facility: OTHER | Age: 76
End: 2021-03-18

## 2021-03-23 DIAGNOSIS — Z85.46 HISTORY OF PROSTATE CANCER: Primary | ICD-10-CM

## 2021-03-23 NOTE — PROGRESS NOTES
"Per last office visit  2/4/21 with Romel Ray MD \"Plan  Continue active surveillance for prostate cancer with PSA q6 months\"        Orders Placed    "

## 2021-03-25 ENCOUNTER — TRANSFERRED RECORDS (OUTPATIENT)
Dept: HEALTH INFORMATION MANAGEMENT | Facility: OTHER | Age: 76
End: 2021-03-25

## 2021-03-25 ENCOUNTER — TELEPHONE (OUTPATIENT)
Dept: CARDIAC REHAB | Facility: OTHER | Age: 76
End: 2021-03-25

## 2021-03-25 DIAGNOSIS — Z95.2 S/P TAVR (TRANSCATHETER AORTIC VALVE REPLACEMENT): Primary | ICD-10-CM

## 2021-03-25 NOTE — TELEPHONE ENCOUNTER
Patient verified their .  Called patient re: referral to cardiac rehab received from [facility].  Instructed on program and goals of cardiac rehab.  Appointment set for 3/26/2021.  Patient verbalized understanding.

## 2021-03-26 ENCOUNTER — HOSPITAL ENCOUNTER (OUTPATIENT)
Dept: CARDIAC REHAB | Facility: OTHER | Age: 76
End: 2021-03-26
Attending: INTERNAL MEDICINE
Payer: COMMERCIAL

## 2021-03-26 DIAGNOSIS — Z95.2 S/P TAVR (TRANSCATHETER AORTIC VALVE REPLACEMENT): ICD-10-CM

## 2021-03-26 PROCEDURE — 999N000109 HC STATISTIC OP CR VISIT

## 2021-03-26 PROCEDURE — 93798 PHYS/QHP OP CAR RHAB W/ECG: CPT

## 2021-03-26 RX ORDER — CLOPIDOGREL BISULFATE 75 MG/1
75 TABLET ORAL DAILY
COMMUNITY
Start: 2021-03-18 | End: 2022-01-10

## 2021-03-27 ENCOUNTER — HEALTH MAINTENANCE LETTER (OUTPATIENT)
Age: 76
End: 2021-03-27

## 2021-03-29 ENCOUNTER — HOSPITAL ENCOUNTER (OUTPATIENT)
Dept: CARDIAC REHAB | Facility: OTHER | Age: 76
End: 2021-03-29
Attending: INTERNAL MEDICINE
Payer: COMMERCIAL

## 2021-03-29 PROCEDURE — 93798 PHYS/QHP OP CAR RHAB W/ECG: CPT

## 2021-03-29 PROCEDURE — 999N000109 HC STATISTIC OP CR VISIT

## 2021-03-31 ENCOUNTER — HOSPITAL ENCOUNTER (OUTPATIENT)
Dept: CARDIAC REHAB | Facility: OTHER | Age: 76
End: 2021-03-31
Attending: INTERNAL MEDICINE
Payer: COMMERCIAL

## 2021-03-31 PROCEDURE — 93798 PHYS/QHP OP CAR RHAB W/ECG: CPT

## 2021-03-31 PROCEDURE — 999N000109 HC STATISTIC OP CR VISIT

## 2021-04-02 ENCOUNTER — HOSPITAL ENCOUNTER (OUTPATIENT)
Dept: CARDIAC REHAB | Facility: OTHER | Age: 76
End: 2021-04-02
Attending: INTERNAL MEDICINE
Payer: COMMERCIAL

## 2021-04-02 PROCEDURE — 93798 PHYS/QHP OP CAR RHAB W/ECG: CPT

## 2021-04-02 PROCEDURE — 999N000109 HC STATISTIC OP CR VISIT

## 2021-04-07 ENCOUNTER — HOSPITAL ENCOUNTER (OUTPATIENT)
Dept: CARDIAC REHAB | Facility: OTHER | Age: 76
End: 2021-04-07
Attending: INTERNAL MEDICINE
Payer: COMMERCIAL

## 2021-04-07 PROCEDURE — 93798 PHYS/QHP OP CAR RHAB W/ECG: CPT

## 2021-04-07 PROCEDURE — 999N000109 HC STATISTIC OP CR VISIT

## 2021-04-09 ENCOUNTER — HOSPITAL ENCOUNTER (OUTPATIENT)
Dept: CARDIAC REHAB | Facility: OTHER | Age: 76
End: 2021-04-09
Attending: INTERNAL MEDICINE
Payer: COMMERCIAL

## 2021-04-09 PROCEDURE — 93798 PHYS/QHP OP CAR RHAB W/ECG: CPT

## 2021-04-09 PROCEDURE — 999N000109 HC STATISTIC OP CR VISIT

## 2021-04-12 ENCOUNTER — HOSPITAL ENCOUNTER (OUTPATIENT)
Dept: CARDIAC REHAB | Facility: OTHER | Age: 76
End: 2021-04-12
Attending: INTERNAL MEDICINE
Payer: COMMERCIAL

## 2021-04-12 PROCEDURE — 999N000109 HC STATISTIC OP CR VISIT

## 2021-04-12 PROCEDURE — 93798 PHYS/QHP OP CAR RHAB W/ECG: CPT

## 2021-04-14 ENCOUNTER — HOSPITAL ENCOUNTER (OUTPATIENT)
Dept: CARDIAC REHAB | Facility: OTHER | Age: 76
End: 2021-04-14
Attending: INTERNAL MEDICINE
Payer: COMMERCIAL

## 2021-04-14 PROCEDURE — 999N000109 HC STATISTIC OP CR VISIT

## 2021-04-14 PROCEDURE — 93798 PHYS/QHP OP CAR RHAB W/ECG: CPT

## 2021-04-16 ENCOUNTER — HOSPITAL ENCOUNTER (OUTPATIENT)
Dept: CARDIAC REHAB | Facility: OTHER | Age: 76
End: 2021-04-16
Attending: INTERNAL MEDICINE
Payer: COMMERCIAL

## 2021-04-16 PROCEDURE — 93798 PHYS/QHP OP CAR RHAB W/ECG: CPT

## 2021-04-16 PROCEDURE — 999N000109 HC STATISTIC OP CR VISIT

## 2021-04-21 ENCOUNTER — HOSPITAL ENCOUNTER (OUTPATIENT)
Dept: CARDIAC REHAB | Facility: OTHER | Age: 76
End: 2021-04-21
Attending: INTERNAL MEDICINE
Payer: COMMERCIAL

## 2021-04-21 PROCEDURE — 93798 PHYS/QHP OP CAR RHAB W/ECG: CPT

## 2021-04-21 PROCEDURE — 999N000109 HC STATISTIC OP CR VISIT

## 2021-04-23 ENCOUNTER — HOSPITAL ENCOUNTER (OUTPATIENT)
Dept: CARDIAC REHAB | Facility: OTHER | Age: 76
End: 2021-04-23
Attending: INTERNAL MEDICINE
Payer: COMMERCIAL

## 2021-04-23 PROCEDURE — 93798 PHYS/QHP OP CAR RHAB W/ECG: CPT

## 2021-04-23 PROCEDURE — 999N000109 HC STATISTIC OP CR VISIT

## 2021-04-27 ENCOUNTER — TRANSFERRED RECORDS (OUTPATIENT)
Dept: HEALTH INFORMATION MANAGEMENT | Facility: OTHER | Age: 76
End: 2021-04-27

## 2021-06-03 DIAGNOSIS — I10 BENIGN ESSENTIAL HYPERTENSION: ICD-10-CM

## 2021-06-03 RX ORDER — LOSARTAN POTASSIUM 100 MG/1
100 TABLET ORAL DAILY
Qty: 90 TABLET | Refills: 0 | Status: SHIPPED | OUTPATIENT
Start: 2021-06-03 | End: 2021-08-23

## 2021-06-03 NOTE — TELEPHONE ENCOUNTER
"Requested Prescriptions   Pending Prescriptions Disp Refills     losartan (COZAAR) 100 MG tablet [Pharmacy Med Name: losartan 100 mg tablet] 90 tablet 3     Sig: Take 1 tablet (100 mg) by mouth daily       Angiotensin-II Receptors Passed - 6/3/2021  8:31 AM        Passed - Last blood pressure under 140/90 in past 12 months     BP Readings from Last 3 Encounters:   02/04/21 134/72   08/03/20 130/80   06/04/20 138/80                 Passed - Recent (12 mo) or future (30 days) visit within the authorizing provider's specialty     Patient has had an office visit with the authorizing provider or a provider within the authorizing providers department within the previous 12 mos or has a future within next 30 days. See \"Patient Info\" tab in inbasket, or \"Choose Columns\" in Meds & Orders section of the refill encounter.              Passed - Medication is active on med list        Passed - Patient is age 18 or older        Passed - Normal serum creatinine on file in past 12 months     Recent Labs   Lab Test 02/16/21  1100   CR 1.08       Ok to refill medication if creatinine is low          Passed - Normal serum potassium on file in past 12 months     Recent Labs   Lab Test 02/04/21  1058   POTASSIUM 4.3                     Prescription approved per [facility] Refill Protocol.  LOV 06/04/2020 with David Fernandes DO.  #90 refilled.  Taylor Orantes RN on 6/3/2021 at 1:21 PM          "

## 2021-08-05 ENCOUNTER — LAB (OUTPATIENT)
Dept: LAB | Facility: OTHER | Age: 76
End: 2021-08-05
Attending: UROLOGY
Payer: COMMERCIAL

## 2021-08-05 DIAGNOSIS — Z85.46 HISTORY OF PROSTATE CANCER: ICD-10-CM

## 2021-08-05 LAB
HOLD SPECIMEN: NORMAL
PSA SERPL-MCNC: 4.36 UG/L (ref 0–4)

## 2021-08-05 PROCEDURE — 36415 COLL VENOUS BLD VENIPUNCTURE: CPT | Mod: ZL

## 2021-08-05 PROCEDURE — 84153 ASSAY OF PSA TOTAL: CPT | Mod: ZL

## 2021-08-22 DIAGNOSIS — I10 BENIGN ESSENTIAL HYPERTENSION: ICD-10-CM

## 2021-08-22 DIAGNOSIS — I71.21 ASCENDING AORTIC ANEURYSM (H): Primary | ICD-10-CM

## 2021-08-23 RX ORDER — METOPROLOL SUCCINATE 25 MG/1
TABLET, EXTENDED RELEASE ORAL
Qty: 90 TABLET | Refills: 3 | Status: SHIPPED | OUTPATIENT
Start: 2021-08-23 | End: 2022-01-11

## 2021-08-23 RX ORDER — LOSARTAN POTASSIUM 100 MG/1
100 TABLET ORAL DAILY
Qty: 90 TABLET | Refills: 3 | Status: SHIPPED | OUTPATIENT
Start: 2021-08-23 | End: 2022-01-11

## 2021-08-23 NOTE — TELEPHONE ENCOUNTER
"Requested Prescriptions   Pending Prescriptions Disp Refills     metoprolol succinate ER (TOPROL-XL) 25 MG 24 hr tablet [Pharmacy Med Name: metoprolol succinate ER 25 mg tablet,extended release 24 hr] 90 tablet 3     Sig: TAKE 1 TABLET BY MOUTH DAILY       Beta-Blockers Protocol Passed - 8/22/2021  8:00 AM        Passed - Blood pressure under 140/90 in past 12 months     BP Readings from Last 3 Encounters:   02/04/21 134/72   08/03/20 130/80   06/04/20 138/80                 Passed - Patient is age 6 or older        Passed - Recent (12 mo) or future (30 days) visit within the authorizing provider's specialty     Patient has had an office visit with the authorizing provider or a provider within the authorizing providers department within the previous 12 mos or has a future within next 30 days. See \"Patient Info\" tab in inbasket, or \"Choose Columns\" in Meds & Orders section of the refill encounter.              Passed - Medication is active on med list           losartan (COZAAR) 100 MG tablet [Pharmacy Med Name: losartan 100 mg tablet] 90 tablet 3     Sig: Take 1 tablet (100 mg) by mouth daily       Angiotensin-II Receptors Passed - 8/22/2021  8:00 AM        Passed - Last blood pressure under 140/90 in past 12 months     BP Readings from Last 3 Encounters:   02/04/21 134/72   08/03/20 130/80   06/04/20 138/80                 Passed - Recent (12 mo) or future (30 days) visit within the authorizing provider's specialty     Patient has had an office visit with the authorizing provider or a provider within the authorizing providers department within the previous 12 mos or has a future within next 30 days. See \"Patient Info\" tab in inbasket, or \"Choose Columns\" in Meds & Orders section of the refill encounter.              Passed - Medication is active on med list        Passed - Patient is age 18 or older        Passed - Normal serum creatinine on file in past 12 months     Recent Labs   Lab Test 02/16/21  1100   CR 1.08 "       Ok to refill medication if creatinine is low          Passed - Normal serum potassium on file in past 12 months     Recent Labs   Lab Test 02/04/21  1058   POTASSIUM 4.3                      Last Written Prescription Date: 6/3/2021 losartan  Last Fill Quantity: 90,   # refills: 0    Last Written Prescription Date: 6/4/2020 metoprolol succinate ER (Toprol-XL)  Last Fill Quantity: 90,   # refills: 3  Last Office Visit: 6/4/2020 with Dr. Fernandes note states to follow-up 6 months after echocardiogram.  Future Office visit:    Next 5 appointments (look out 90 days)    Aug 25, 2021  2:00 PM  Return Visit with Romel Ray MD  [facility] ([facility] ) [address]  [place] [id number] [phone]      Unable to complete prescription refill per RN medication refill policy. Will route to provider for review and consideration.  Taylor Orantes RN on 8/23/2021 at 2:51 PM

## 2021-08-25 ENCOUNTER — OFFICE VISIT (OUTPATIENT)
Dept: UROLOGY | Facility: OTHER | Age: 76
End: 2021-08-25
Attending: UROLOGY
Payer: COMMERCIAL

## 2021-08-25 VITALS
BODY MASS INDEX: 31.76 KG/M2 | OXYGEN SATURATION: 96 % | RESPIRATION RATE: 18 BRPM | HEART RATE: 64 BPM | SYSTOLIC BLOOD PRESSURE: 138 MMHG | DIASTOLIC BLOOD PRESSURE: 72 MMHG | WEIGHT: 214.4 LBS

## 2021-08-25 DIAGNOSIS — C61 PROSTATE CANCER (H): Primary | ICD-10-CM

## 2021-08-25 PROCEDURE — 99213 OFFICE O/P EST LOW 20 MIN: CPT | Performed by: UROLOGY

## 2021-08-25 PROCEDURE — G0463 HOSPITAL OUTPT CLINIC VISIT: HCPCS

## 2021-08-25 RX ORDER — RIVAROXABAN 20 MG/1
TABLET, FILM COATED ORAL
COMMUNITY
Start: 2021-08-09 | End: 2022-01-10

## 2021-08-25 RX ORDER — MOXIFLOXACIN 5 MG/ML
SOLUTION/ DROPS OPHTHALMIC
COMMUNITY
Start: 2020-09-01 | End: 2022-01-10

## 2021-08-25 RX ORDER — PREDNISONE 20 MG/1
TABLET ORAL
COMMUNITY
Start: 2021-02-26 | End: 2022-01-10

## 2021-08-25 RX ORDER — GLUCOSAM/CHON-MSM1/C/MANG/BOSW 500-416.6
TABLET ORAL
COMMUNITY
Start: 2020-11-12 | End: 2022-01-10

## 2021-08-25 RX ORDER — INFLUENZA A VIRUS A/MICHIGAN/45/2015 X-275 (H1N1) ANTIGEN (FORMALDEHYDE INACTIVATED), INFLUENZA A VIRUS A/SINGAPORE/INFIMH-16-0019/2016 IVR-186 (H3N2) ANTIGEN (FORMALDEHYDE INACTIVATED), INFLUENZA B VIRUS B/PHUKET/3073/2013 ANTIGEN (FORMALDEHYDE INACTIVATED), AND INFLUENZA B VIRUS B/MARYLAND/15/2016 BX-69A ANTIGEN (FORMALDEHYDE INACTIVATED) 60; 60; 60; 60 UG/.7ML; UG/.7ML; UG/.7ML; UG/.7ML
INJECTION, SUSPENSION INTRAMUSCULAR
COMMUNITY
Start: 2020-09-01 | End: 2022-01-10

## 2021-08-25 RX ORDER — PREDNISOLONE ACETATE 10 MG/ML
SUSPENSION/ DROPS OPHTHALMIC
COMMUNITY
Start: 2020-09-01 | End: 2022-01-10

## 2021-08-25 RX ORDER — KETOROLAC TROMETHAMINE 5 MG/ML
SOLUTION OPHTHALMIC
COMMUNITY
Start: 2020-09-01 | End: 2022-01-10

## 2021-08-25 RX ORDER — LOSARTAN POTASSIUM AND HYDROCHLOROTHIAZIDE 25; 100 MG/1; MG/1
TABLET ORAL
COMMUNITY
Start: 2021-08-04 | End: 2022-01-10

## 2021-08-25 ASSESSMENT — PAIN SCALES - GENERAL: PAINLEVEL: NO PAIN (0)

## 2021-08-25 NOTE — PROGRESS NOTES
Type of Visit  EST    Chief Complaint  Prostate cancer on active surveillance    HPI  Mr. Lovell is a 76 y.o. male with clinical low risk prostate cancer (since 2013) on active surveillance.   He denies any significant changes in health in the past 6 months.  Prior to the past 6 months he did undergo aortic valve replacement.  He denies unintentional weight loss, bone or pelvic pain.  He underwent a PSA earlier and follows up to review the results.  He denies dysuria or gross hematuria.      Review of Systems  I reviewed the ROS the patient today.    Nursing Notes:   Jesús Ayala LPN  8/25/2021  2:02 PM  Signed  Review of Systems:    Weight loss:    No     Recent fever/chills:  No   Night sweats:   No  Current skin rash:  No   Recent hair loss:  No  Heat intolerance:  No   Cold intolerance:  No  Chest pain:   No   Palpitations:   No  Shortness of breath:  No   Wheezing:   No  Constipation:    No   Diarrhea:   No   Nausea:   No   Vomiting:   No   Kidney/side pain:  No   Back pain:   No  Frequent headaches:  No   Dizziness:     No  Leg swelling:   No   Calf pain:    No    Jesús Ayala LPN on 8/25/2021 at 2:02 PM      Physical Exam  /72   Pulse 64   Resp 18   Wt 97.3 kg (214 lb 6.4 oz)   SpO2 96%   BMI 31.76 kg/m    Constitutional: NAD, WDWN.  Cardiovascular: Regular rate.  Pulmonary/Chest: Respirations are even and non-labored bilaterally.  Abdominal: Soft. No distension, tenderness, masses or guarding. No CVA tenderness.  Extremities: GLORIA x 4, Warm. No clubbing.  No cyanosis.    Skin: Pink, warm and dry.  No rashes noted.  Genitourinary:  Nonpalpable bladder  Prostate:  40 grams.  Symmetric, non-tender, and no induration.      Labs  Results for LIBORIO LOVELL (MRN [id number]) as of 8/25/2021 14:16   8/5/2021 09:50   PSA 4.36 (H)     Results for LIBORIO LOVELL (MRN [id number]) as of 2/4/2021 13:48   2/4/2021 10:58   PSA 4.551 (H)     Results for VIANEY LIBORIO CHRISTENSEN (MRN  [id number]) as of 8/3/2020 13:36   8/3/2020 09:53   PSA 4.154 (H)     Results for LIBORIO LOVELL (MRN [id number]) as of 2/3/2020 13:22   2/3/2020 09:52   PSA 4.744 (H)     Results for LIBORIO LOVELL (MRN [id number]) as of 7/29/2019 12:00   2/18/2019 09:51 7/29/2019 08:24   PSA 4.337 (H) 3.869 (H)     Results for LIBORIO LOVELL (MRN [id number]) as of 8/24/2018 11:26   7/25/2018 11:17   PSA 4.908 (H)     Results for LIBORIO LOVELL (MRN [id number]) as of 1/15/2018 10:05   9/24/2014 11:10 3/25/2015 11:00   PSA 2.450 2.680     Pathology  12/16/2015  Grade 3+3=6 prostate cancer in 2/12 cores (80%)    10/9/2013  Grade 3+3=6 prostate cancer in 2/12 cores (15-20%)    Oncotype Dx    (from the 10/2013 specimen)  GPS:     24  Likelihood of favorable pathology: 61%    PSA:       3.389  Calculated prostate volume based on TRUS: 56 grams  PSA Density:      0.06 ng/mL/g  T stage:      cT2a    Assessment  Mr. Lovell is a 76 y.o. male with initial low risk prostate cancer (cT2a) on active surveillance.  PSA continues to be completely stable.    Plan  Continue active surveillance for prostate cancer with PSA q6 months

## 2021-08-25 NOTE — NURSING NOTE
Review of Systems:    Weight loss:    No     Recent fever/chills:  No   Night sweats:   No  Current skin rash:  No   Recent hair loss:  No  Heat intolerance:  No   Cold intolerance:  No  Chest pain:   No   Palpitations:   No  Shortness of breath:  No   Wheezing:   No  Constipation:    No   Diarrhea:   No   Nausea:   No   Vomiting:   No   Kidney/side pain:  No   Back pain:   No  Frequent headaches:  No   Dizziness:     No  Leg swelling:   No   Calf pain:    No    Jesús Ayala LPN on 8/25/2021 at 2:02 PM

## 2021-09-11 ENCOUNTER — HEALTH MAINTENANCE LETTER (OUTPATIENT)
Age: 76
End: 2021-09-11

## 2021-09-15 ENCOUNTER — TELEPHONE (OUTPATIENT)
Dept: FAMILY MEDICINE | Facility: OTHER | Age: 76
End: 2021-09-15

## 2021-09-15 NOTE — TELEPHONE ENCOUNTER
Patient would like to come in and have an atni body test done for Covid-19.  Please call back.  Thank you   Bernadette Livingston on 9/15/2021 at 9:17 AM

## 2021-09-15 NOTE — TELEPHONE ENCOUNTER
Patient and spouse were wondering about Antibody testing due to several family members have not received the COVID vaccine.  Patient and spouse are feeling healthy at this time.        Beronica Pate LPN 9/15/2021 11:45 AM

## 2021-09-15 NOTE — TELEPHONE ENCOUNTER
Checking the antibodies would not really change anything. He is vaccinated. There is an expensive test at [facility] that can check for natural COVID infection, but other antibody tests would be positive due to vaccination. To me, the testing is not indicated and not worthwhile as it would not change anything we do.

## 2021-10-21 ENCOUNTER — TRANSFERRED RECORDS (OUTPATIENT)
Dept: HEALTH INFORMATION MANAGEMENT | Facility: OTHER | Age: 76
End: 2021-10-21

## 2022-01-11 ENCOUNTER — HOSPITAL ENCOUNTER (OUTPATIENT)
Dept: GENERAL RADIOLOGY | Facility: OTHER | Age: 77
End: 2022-01-11
Attending: FAMILY MEDICINE
Payer: COMMERCIAL

## 2022-01-11 ENCOUNTER — OFFICE VISIT (OUTPATIENT)
Dept: FAMILY MEDICINE | Facility: OTHER | Age: 77
End: 2022-01-11
Attending: FAMILY MEDICINE
Payer: COMMERCIAL

## 2022-01-11 VITALS
TEMPERATURE: 96.7 F | SYSTOLIC BLOOD PRESSURE: 124 MMHG | BODY MASS INDEX: 31.84 KG/M2 | HEIGHT: 69 IN | DIASTOLIC BLOOD PRESSURE: 68 MMHG | WEIGHT: 215 LBS | OXYGEN SATURATION: 96 % | RESPIRATION RATE: 18 BRPM | HEART RATE: 72 BPM

## 2022-01-11 DIAGNOSIS — Z00.00 ENCOUNTER FOR MEDICARE ANNUAL WELLNESS EXAM: Primary | ICD-10-CM

## 2022-01-11 DIAGNOSIS — M25.571 PAIN IN JOINT, ANKLE AND FOOT, RIGHT: ICD-10-CM

## 2022-01-11 DIAGNOSIS — E78.2 MIXED HYPERLIPIDEMIA: ICD-10-CM

## 2022-01-11 DIAGNOSIS — I10 BENIGN ESSENTIAL HYPERTENSION: ICD-10-CM

## 2022-01-11 DIAGNOSIS — I25.10 CORONARY ARTERY DISEASE INVOLVING NATIVE CORONARY ARTERY OF NATIVE HEART WITHOUT ANGINA PECTORIS: ICD-10-CM

## 2022-01-11 DIAGNOSIS — H93.19 UNILATERAL SUBJECTIVE NONPULSATILE TINNITUS WITHOUT HEARING LOSS, OTOSCOPIC FINDING, NEUROLOGIC DEFICIT, OR HEAD TRAUMA: ICD-10-CM

## 2022-01-11 DIAGNOSIS — E55.9 VITAMIN D INSUFFICIENCY: ICD-10-CM

## 2022-01-11 DIAGNOSIS — I71.21 ASCENDING AORTIC ANEURYSM (H): ICD-10-CM

## 2022-01-11 LAB
ANION GAP SERPL CALCULATED.3IONS-SCNC: 8 MMOL/L (ref 3–14)
BUN SERPL-MCNC: 21 MG/DL (ref 7–25)
CALCIUM SERPL-MCNC: 9.8 MG/DL (ref 8.6–10.3)
CHLORIDE BLD-SCNC: 104 MMOL/L (ref 98–107)
CHOLEST SERPL-MCNC: 121 MG/DL
CO2 SERPL-SCNC: 27 MMOL/L (ref 21–31)
CREAT SERPL-MCNC: 1.01 MG/DL (ref 0.7–1.3)
DEPRECATED CALCIDIOL+CALCIFEROL SERPL-MC: 24 UG/L (ref 30–100)
ERYTHROCYTE [DISTWIDTH] IN BLOOD BY AUTOMATED COUNT: 13.1 % (ref 10–15)
FASTING STATUS PATIENT QL REPORTED: NORMAL
GFR SERPL CREATININE-BSD FRML MDRD: 76 ML/MIN/1.73M2
GLUCOSE BLD-MCNC: 97 MG/DL (ref 70–105)
HCT VFR BLD AUTO: 41.9 % (ref 40–53)
HDLC SERPL-MCNC: 40 MG/DL (ref 23–92)
HGB BLD-MCNC: 14.6 G/DL (ref 13.3–17.7)
LDLC SERPL CALC-MCNC: 56 MG/DL
MCH RBC QN AUTO: 31.6 PG (ref 26.5–33)
MCHC RBC AUTO-ENTMCNC: 34.8 G/DL (ref 31.5–36.5)
MCV RBC AUTO: 91 FL (ref 78–100)
NONHDLC SERPL-MCNC: 81 MG/DL
PLATELET # BLD AUTO: 149 10E3/UL (ref 150–450)
POTASSIUM BLD-SCNC: 4.4 MMOL/L (ref 3.5–5.1)
RBC # BLD AUTO: 4.62 10E6/UL (ref 4.4–5.9)
SODIUM SERPL-SCNC: 139 MMOL/L (ref 134–144)
TRIGL SERPL-MCNC: 125 MG/DL
WBC # BLD AUTO: 5.5 10E3/UL (ref 4–11)

## 2022-01-11 PROCEDURE — G0463 HOSPITAL OUTPT CLINIC VISIT: HCPCS | Mod: 25

## 2022-01-11 PROCEDURE — 99213 OFFICE O/P EST LOW 20 MIN: CPT | Mod: 25 | Performed by: FAMILY MEDICINE

## 2022-01-11 PROCEDURE — 80061 LIPID PANEL: CPT | Mod: ZL | Performed by: FAMILY MEDICINE

## 2022-01-11 PROCEDURE — 85027 COMPLETE CBC AUTOMATED: CPT | Mod: ZL | Performed by: FAMILY MEDICINE

## 2022-01-11 PROCEDURE — G0439 PPPS, SUBSEQ VISIT: HCPCS | Performed by: FAMILY MEDICINE

## 2022-01-11 PROCEDURE — 73610 X-RAY EXAM OF ANKLE: CPT | Mod: RT

## 2022-01-11 PROCEDURE — 80048 BASIC METABOLIC PNL TOTAL CA: CPT | Mod: ZL | Performed by: FAMILY MEDICINE

## 2022-01-11 PROCEDURE — 36415 COLL VENOUS BLD VENIPUNCTURE: CPT | Mod: ZL | Performed by: FAMILY MEDICINE

## 2022-01-11 PROCEDURE — 82306 VITAMIN D 25 HYDROXY: CPT | Mod: ZL | Performed by: FAMILY MEDICINE

## 2022-01-11 RX ORDER — LOSARTAN POTASSIUM 100 MG/1
100 TABLET ORAL DAILY
Qty: 90 TABLET | Refills: 4 | Status: SHIPPED | OUTPATIENT
Start: 2022-01-11 | End: 2022-02-28

## 2022-01-11 RX ORDER — METOPROLOL SUCCINATE 25 MG/1
12.5 TABLET, EXTENDED RELEASE ORAL DAILY
Qty: 45 TABLET | Refills: 3 | Status: SHIPPED | OUTPATIENT
Start: 2022-01-11 | End: 2022-11-21

## 2022-01-11 RX ORDER — METOPROLOL SUCCINATE 25 MG/1
25 TABLET, EXTENDED RELEASE ORAL DAILY
Qty: 90 TABLET | Refills: 4 | Status: CANCELLED | OUTPATIENT
Start: 2022-01-11

## 2022-01-11 RX ORDER — TURMERIC/TURMERIC EXT/PEPR EXT 900-100 MG
1 CAPSULE ORAL DAILY
COMMUNITY
Start: 2022-01-11

## 2022-01-11 RX ORDER — CHOLECALCIFEROL (VITAMIN D3) 50 MCG
1 TABLET ORAL DAILY
COMMUNITY
Start: 2022-01-11

## 2022-01-11 RX ORDER — ROSUVASTATIN CALCIUM 20 MG/1
20 TABLET, COATED ORAL AT BEDTIME
Qty: 90 TABLET | Refills: 4 | Status: SHIPPED | OUTPATIENT
Start: 2022-01-11 | End: 2023-01-11

## 2022-01-11 RX ORDER — AMOXICILLIN 500 MG/1
CAPSULE ORAL
COMMUNITY
Start: 2021-12-01

## 2022-01-11 ASSESSMENT — PAIN SCALES - GENERAL: PAINLEVEL: SEVERE PAIN (6)

## 2022-01-11 ASSESSMENT — MIFFLIN-ST. JEOR: SCORE: 1685.61

## 2022-01-11 ASSESSMENT — ACTIVITIES OF DAILY LIVING (ADL): CURRENT_FUNCTION: NO ASSISTANCE NEEDED

## 2022-01-11 NOTE — PATIENT INSTRUCTIONS
Start vitamin D3 50 mcg (2,000 units) daily    Decrease metoprolol to 12.5 mg (1/2 pill) daily  If you still have a lot of lightheadedness in a month, then may need adjustment in losartan    MRI of the brain to check for a tumor on ear from nerve    Start exercises for ankle sprain. Need to strengthen and stabilize the ankle to prevent rolling  Consider wearing ankle brace to help with stability    Patient Education   Personalized Prevention Plan  You are due for the preventive services outlined below.  Your care team is available to assist you in scheduling these services.  If you have already completed any of these items, please share that information with your care team to update in your medical record.  There are no preventive care reminders to display for this patient.

## 2022-01-11 NOTE — NURSING NOTE
"Patient presents to the clinic for Medicare wellness.    FOOD SECURITY SCREENING QUESTIONS:    The next two questions are to help us understand your food security.  If you are feeling you need any assistance in this area, we have resources available to support you today.    Hunger Vital Signs:  Within the past 12 months we worried whether our food would run out before we got money to buy more. Never  Within the past 12 months the food we bought just didn't last and we didn't have money to get more. Never    Advance Care Directive on file? no  Advance Care Directive provided to patient? Declined.     Chief Complaint   Patient presents with     Medicare Visit     wellness       Initial /68 (BP Location: Right arm, Patient Position: Sitting, Cuff Size: Adult Large)   Pulse 72   Temp (!) 96.7  F (35.9  C) (Tympanic)   Resp 18   Ht 1.753 m (5' 9\")   Wt 97.5 kg (215 lb)   SpO2 96%   BMI 31.75 kg/m   Estimated body mass index is 31.75 kg/m  as calculated from the following:    Height as of this encounter: 1.753 m (5' 9\").    Weight as of this encounter: 97.5 kg (215 lb).  Medication Reconciliation: complete        Beronica Pate LPN    "

## 2022-01-11 NOTE — PROGRESS NOTES
"SUBJECTIVE:   Praveen Lovell is a 76 year old male who presents for Preventive Visit.  Answers for HPI/ROS submitted by the patient on 1/11/2022  In general, how would you rate your overall physical health?: good  Frequency of exercise:: 4-5 days/week  Do you usually eat at least 4 servings of fruit and vegetables a day, include whole grains & fiber, and avoid regularly eating high fat or \"junk\" foods? : Yes  Taking medications regularly:: Yes  Medication side effects:: Muscle aches, Lightheadedness  Activities of Daily Living: no assistance needed  Home safety: lack of grab bars in the bathroom  Hearing Impairment:: difficulty following a conversation in a noisy restaurant or crowded room, feel that people are mumbling or not speaking clearly, difficulty following dialogue in the theater, difficult to understand a speaker at a public meeting or [employer] service, need to ask people to speak up or repeat themselves, find that men's voices are easier to understand than woman's, difficulty understanding soft or whispered speech  In the past 6 months, have you been bothered by leaking of urine?: No  In general, how would you rate your overall mental or emotional health?: good  Additional concerns today:: Yes  Duration of exercise:: 30-45 minutes  Patient has been advised of split billing requirements and indicates understanding: Yes   Are you in the first 12 months of your Medicare coverage?  No    HPI  Do you feel safe in your environment? Yes    Have you ever done Advance Care Planning? (For example, a Health Directive, POLST, or a discussion with a medical provider or your loved ones about your wishes): No, Advance Care Directive was discussed today.       Fall risk  Fallen 2 or more times in the past year?: No  Any fall with injury in the past year?: No    Cognitive Screening   1) Repeat 3 items (Leader, Season, Table)    2) Clock draw: NORMAL  3) 3 item recall: Recalls 2 objects   Results: NORMAL clock, 1-2 " "items recalled: COGNITIVE IMPAIRMENT LESS LIKELY    Mini-CogTM Copyright LENNIE Murdock. Licensed by the author for use in [facility]; reprinted with permission (dane@[facility]). All rights reserved.      Do you have sleep apnea, excessive snoring or daytime drowsiness?: no     Had mechanical TAVR at [facility] with Dr Calvin 2021 for severe aortic stenosis due to a bicuspid valve.  History of CAD with stenting (no CABG unlike [facility] notes mention). Doing well since surgery.    Was having side effects on Crestor 40 mg, so dose was lowered.  Now tolerating Crestor at 20 mg daily.    Active surveillance for prostate cancer with Dr Ray    Left ear \"whoosing\" noise intermittently for a couple months. Chronic left hearing loss. Hard to hear wife if she is not looking at him.    Right ankle pain at times. Pain is lateral. Worse when on uneven ground. No clear swelling.    Vitamin D deficient when last checked 2020. Not on supplement.      Reviewed and updated as needed this visit by clinical staff  Tobacco  Allergies  Meds  Problems  Med Hx  Surg Hx  Fam Hx  Soc Hx         Reviewed and updated as needed this visit by Provider  Tobacco  Allergies  Meds  Problems  Med Hx  Surg Hx  Fam Hx        Social History     Tobacco Use     Smoking status: Former Smoker     Packs/day: 1.00     Years: 30.00     Pack years: 30.00     Types: Cigarettes     Quit date: 1990     Years since quittin.0     Smokeless tobacco: Never Used     Tobacco comment: Quit smoking: quit 7 years ago   Substance Use Topics     Alcohol use: Yes     Alcohol/week: 3.0 standard drinks     Comment: couple of times per week         Alcohol Use 2022   Prescreen: >3 drinks/day or >7 drinks/week? No               Current providers sharing in care for this patient include:   Patient Care Team:  No Ref-Primary, Physician as PCP - Yan Moreau MD as Assigned PCP  Romel Ray MD as Assigned Surgical " Provider    The following health maintenance items are reviewed in Epic and correct as of today:  There are no preventive care reminders to display for this patient.  Patient Active Problem List   Diagnosis     Benign nodular prostatic hyperplasia with lower urinary tract symptoms     Coronary artery disease involving native coronary artery of native heart without angina pectoris     Mixed hyperlipidemia     ED (erectile dysfunction)     Prostate cancer, low risk, on active surveillance     Ascending aortic aneurysm at 4.1 cm on 18     Bicuspid aortic valve     Obesity     Benign essential hypertension     Hypertrophic cardiomyopathy (H)     Moderate/Severe aortic stenosis     Asymmetric septal hypertrophy (H)     History of cardiac catheterization on 14     H/O heart artery stent on 14 to his LAD     Past Surgical History:   Procedure Laterality Date     COLONOSCOPY N/A 2018    Tubular adenoma, no follow up due to age     HERNIA REPAIR      ,HERNIA REPAIR     STENT,CORONARY, S660 2014    Cor angio: 90% prox LAD; s/p ZAYNAB x 1 to prox LAD with PTCA of diagonal 1 that was jailed due to plaque shifting, EF 65%       Social History     Tobacco Use     Smoking status: Former Smoker     Packs/day: 1.00     Years: 30.00     Pack years: 30.00     Types: Cigarettes     Quit date: 1990     Years since quittin.0     Smokeless tobacco: Never Used     Tobacco comment: Quit smoking: quit 7 years ago   Substance Use Topics     Alcohol use: Yes     Alcohol/week: 3.0 standard drinks     Comment: couple of times per week     Family History   Problem Relation Age of Onset     Arthritis Mother         Arthritis     Other - See Comments Mother 96        Stroke     Heart Disease Sister      Cancer Sister      Colon Cancer Paternal Aunt         Cancer-colon     Prostate Cancer Paternal Uncle 70        Cancer-prostate     Breast Cancer Sister          Current Outpatient Medications  "  Medication Sig Dispense Refill     aspirin (ASA) 81 MG chewable tablet Take 1 tablet (81 mg) by mouth daily 90 tablet 3     Black Pepper-Turmeric (TURMERIC CURCUMIN) 5-1000 MG CAPS Take 1 capsule by mouth daily       diphenhydrAMINE-acetaminophen (TYLENOL PM)  MG tablet Take 1 tablet by mouth nightly as needed for sleep       losartan (COZAAR) 100 MG tablet Take 1 tablet (100 mg) by mouth daily 90 tablet 3     metoprolol succinate ER (TOPROL-XL) 25 MG 24 hr tablet TAKE 1 TABLET BY MOUTH DAILY 90 tablet 3     nitroGLYcerin (NITROSTAT) 0.4 MG sublingual tablet Place 1 tablet (0.4 mg) under the tongue every 5 minutes as needed for chest pain 1 tablet under the tongue every 5 minutes for 3 doses. 25 tablet 3     rosuvastatin (CRESTOR) 20 MG tablet Take 1 tablet (20 mg) by mouth At Bedtime 90 tablet 3     amoxicillin (AMOXIL) 500 MG capsule take 4 Capsules by mouth 1 hour prior to dental appointment.       Allergies   Allergen Reactions     Diatrizoate Rash     Clopidogrel Rash           Review of Systems  General: Denies general constitutional problems  Eyes: Denies problems  Ears/Nose/Throat: As above  Cardiovascular: As above  Respiratory: Denies problems  Gastrointestinal: Denies problems  Genitourinary: Denies problems  Musculoskeletal:  As above  Skin: Denies problems  Neurologic: Denies problems  Psychiatric: Denies problems      OBJECTIVE:   /68 (BP Location: Right arm, Patient Position: Sitting, Cuff Size: Adult Large)   Pulse 72   Temp (!) 96.7  F (35.9  C) (Tympanic)   Resp 18   Ht 1.753 m (5' 9\")   Wt 97.5 kg (215 lb)   SpO2 96%   BMI 31.75 kg/m   Estimated body mass index is 31.75 kg/m  as calculated from the following:    Height as of this encounter: 1.753 m (5' 9\").    Weight as of this encounter: 97.5 kg (215 lb).  Physical Exam  General Appearance: Pleasant, alert, appropriate appearance for age. No acute distress  Eye Exam:  Normal external eye, conjunctiva, lids, cornea. LINDA.  Ear " Exam: Normal TM's bilaterally. Normal auditory canals and external ears.  OroPharynx Exam:  Dental hygiene adequate. Normal buccal mucosa. Normal pharynx.  Neck Exam:  Supple, no masses or nodes. No carotid bruits.  Thyroid Exam: No nodules or enlargement.  Chest/Respiratory Exam: Normal chest wall and respirations. Clear to auscultation.  Cardiovascular Exam: Regular rate and rhythm. S1, S2, no murmur, click, gallop, or rubs.  Gastrointestinal Exam: Soft, non-tender, no masses or organomegaly.  Musculoskeletal Exam: Tender over right lateral malleolus and lateral ligaments.  Minimal tenderness at peroneus longus tendon.  Not clearly aggravated by eversion.  Foot Exam: Left and right foot: good pedal pulses.  Skin: no rash or abnormalities  Neurologic Exam: Nonfocal, symmetric DTRs, normal gross motor, tone coordination and no tremor.  Psychiatric Exam: Alert and oriented - appropriate affect.      Results for orders placed or performed during the hospital encounter of 01/11/22   XR Ankle Right G/E 3 Views     Status: None    Narrative    PROCEDURE:  XR ANKLE RIGHT G/E 3 VIEWS    HISTORY: Pain in joint, ankle and foot, right.    COMPARISON:  None.    TECHNIQUE:  3 views right ankle.    FINDINGS:  No fracture or dislocation is identified. The mortise joint  is congruent. The talar dome is intact. No foreign body is seen.  Lateral ankle soft tissue swelling is noted. Mild enthesopathy of the  calcaneus is seen.      Impression    IMPRESSION: No acute fracture.      DEVENDRA MARS MD         SYSTEM ID:  [id number]   Results for orders placed or performed in visit on 01/11/22   Basic Metabolic Panel     Status: Normal   Result Value Ref Range    Sodium 139 134 - 144 mmol/L    Potassium 4.4 3.5 - 5.1 mmol/L    Chloride 104 98 - 107 mmol/L    Carbon Dioxide (CO2) 27 21 - 31 mmol/L    Anion Gap 8 3 - 14 mmol/L    Urea Nitrogen 21 7 - 25 mg/dL    Creatinine 1.01 0.70 - 1.30 mg/dL    Calcium 9.8 8.6 - 10.3 mg/dL     Glucose 97 70 - 105 mg/dL    GFR Estimate 76 >60 mL/min/1.73m2   CBC W PLT No Diff     Status: Abnormal   Result Value Ref Range    WBC Count 5.5 4.0 - 11.0 10e3/uL    RBC Count 4.62 4.40 - 5.90 10e6/uL    Hemoglobin 14.6 13.3 - 17.7 g/dL    Hematocrit 41.9 40.0 - 53.0 %    MCV 91 78 - 100 fL    MCH 31.6 26.5 - 33.0 pg    MCHC 34.8 31.5 - 36.5 g/dL    RDW 13.1 10.0 - 15.0 %    Platelet Count 149 (L) 150 - 450 10e3/uL   Lipid Panel     Status: None   Result Value Ref Range    Cholesterol 121 <200 mg/dL    Triglycerides 125 <150 mg/dL    Direct Measure HDL 40 23 - 92 mg/dL    LDL Cholesterol Calculated 56 <=100 mg/dL    Non HDL Cholesterol 81 <130 mg/dL    Patient Fasting > 8hrs? Unknown     Narrative    Cholesterol  Desirable:  <200 mg/dL    Triglycerides  Normal:  Less than 150 mg/dL  Borderline High:  150-199 mg/dL  High:  200-499 mg/dL  Very High:  Greater than or equal to 500 mg/dL    Direct Measure HDL  Female:  Greater than or equal to 50 mg/dL   Male:  Greater than or equal to 40 mg/dL    LDL Cholesterol  Desirable:  <100mg/dL  Above Desirable:  100-129 mg/dL   Borderline High:  130-159 mg/dL   High:  160-189 mg/dL   Very High:  >= 190 mg/dL    Non HDL Cholesterol  Desirable:  130 mg/dL  Above Desirable:  130-159 mg/dL  Borderline High:  160-189 mg/dL  High:  190-219 mg/dL  Very High:  Greater than or equal to 220 mg/dL   Vitamin D Total     Status: Abnormal   Result Value Ref Range    Vitamin D, Total (25-Hydroxy) 24 (L) 30 - 100 ug/L        ASSESSMENT / PLAN:       ICD-10-CM    1. Encounter for Medicare annual wellness exam  Z00.00    2. Benign essential hypertension  I10 losartan (COZAAR) 100 MG tablet     metoprolol succinate ER (TOPROL-XL) 25 MG 24 hr tablet     Basic Metabolic Panel     CBC W PLT No Diff     Basic Metabolic Panel     CBC W PLT No Diff   3. Ascending aortic aneurysm at 4.1 cm on 9/18/18  I71.2 losartan (COZAAR) 100 MG tablet     metoprolol succinate ER (TOPROL-XL) 25 MG 24 hr tablet   4.  "Mixed hyperlipidemia  E78.2 rosuvastatin (CRESTOR) 20 MG tablet     Lipid Panel     Lipid Panel   5. Coronary artery disease involving native coronary artery of native heart without angina pectoris  I25.10 rosuvastatin (CRESTOR) 20 MG tablet   6. Pain in joint, ankle and foot, right  M25.571 XR Ankle Right G/E 3 Views   7. Vitamin D insufficiency  E55.9 Vitamin D Total     vitamin D3 (CHOLECALCIFEROL) 50 mcg (2000 units) tablet     Vitamin D Total   8. Unilateral subjective nonpulsatile tinnitus without hearing loss, otoscopic finding, neurologic deficit, or head trauma  H93.19 MR Internal Auditory Canal wo&w Contrast       Patient has been advised of split billing requirements and indicates understanding: Yes     Blood pressure controlled. Lipids good. Refilled metoprolol, losartan, rosuvastatin.    Has a new problem of right lateral ankle pain.  Sent for x-ray, which shows no arthritis.  Likely some ligamentous instability.  Reviewed some exercises that could help, would try exercises for ankle sprain that help to preserve range of motion as well as strength and stabilize the ankle.  Can wear a brace when on uneven ground.    Start vitamin D3 50 mcg daily due to vitamin D insufficiency    Has a whooshing noise on the left ear.  Longstanding hearing loss, but this whooshing noise is new for the past couple months.  We discussed how this could represent schwannoma or less likely brain aneurysm.  Recommend MRI brain IACs for further investigation.      COUNSELING:  Reviewed preventive health counseling, as reflected in patient instructions       Regular exercise       Healthy diet/nutrition       Vision screening       Hearing screening       Immunizations    current       Aspirin prophylaxis     Estimated body mass index is 31.75 kg/m  as calculated from the following:    Height as of this encounter: 1.753 m (5' 9\").    Weight as of this encounter: 97.5 kg (215 lb).    Normal BMI for age    He reports that he quit " smoking about 32 years ago. His smoking use included cigarettes. He has a 30.00 pack-year smoking history. He has never used smokeless tobacco.      Appropriate preventive services were discussed with this patient, including applicable screening as appropriate for cardiovascular disease, diabetes, osteopenia/osteoporosis, and glaucoma.  As appropriate for age/gender, discussed screening for colorectal cancer, prostate cancer, breast cancer, and cervical cancer. Checklist reviewing preventive services available has been given to the patient.    Reviewed patients plan of care and provided an AVS. The Basic Care Plan (routine screening as documented in Health Maintenance) for Praveen meets the Care Plan requirement. This Care Plan has been established and reviewed with the Patient.    Counseling Resources:  ATP IV Guidelines  Pooled Cohorts Equation Calculator  Breast Cancer Risk Calculator  Breast Cancer: Medication to Reduce Risk  FRAX Risk Assessment  ICSI Preventive Guidelines  Dietary Guidelines for Americans, 2010  USDA's MyPlate  ASA Prophylaxis  Lung CA Screening    Yan Zayas MD  [facility]    Identified Health Risks:

## 2022-02-01 ENCOUNTER — HOSPITAL ENCOUNTER (OUTPATIENT)
Dept: MRI IMAGING | Facility: OTHER | Age: 77
Discharge: HOME | End: 2022-02-01
Attending: FAMILY MEDICINE | Admitting: FAMILY MEDICINE
Payer: COMMERCIAL

## 2022-02-01 DIAGNOSIS — H93.19 UNILATERAL SUBJECTIVE NONPULSATILE TINNITUS WITHOUT HEARING LOSS, OTOSCOPIC FINDING, NEUROLOGIC DEFICIT, OR HEAD TRAUMA: ICD-10-CM

## 2022-02-01 PROCEDURE — 255N000002 HC RX 255 OP 636: Performed by: FAMILY MEDICINE

## 2022-02-01 PROCEDURE — A9575 INJ GADOTERATE MEGLUMI 0.1ML: HCPCS | Performed by: FAMILY MEDICINE

## 2022-02-01 PROCEDURE — 70553 MRI BRAIN STEM W/O & W/DYE: CPT

## 2022-02-01 RX ADMIN — GADOTERATE MEGLUMINE 20 ML: 376.9 INJECTION INTRAVENOUS at 17:11

## 2022-02-02 DIAGNOSIS — I65.22 CAROTID STENOSIS, LEFT: Primary | ICD-10-CM

## 2022-02-02 DIAGNOSIS — I67.89 CEREBRAL MICROVASCULAR DISEASE: ICD-10-CM

## 2022-02-04 ENCOUNTER — TRANSFERRED RECORDS (OUTPATIENT)
Dept: HEALTH INFORMATION MANAGEMENT | Facility: OTHER | Age: 77
End: 2022-02-04
Payer: COMMERCIAL

## 2022-02-04 LAB
CREATININE (EXTERNAL): 0.94 MG/DL (ref 0.8–1.5)
GFR ESTIMATED (EXTERNAL): >60 ML/MIN/1.73M2
GLUCOSE (EXTERNAL): 137 MG/DL (ref 70–99)
POTASSIUM (EXTERNAL): 4.6 MMOL/L (ref 3.5–5.1)

## 2022-02-08 DIAGNOSIS — Z95.5 H/O HEART ARTERY STENT: ICD-10-CM

## 2022-02-08 DIAGNOSIS — I25.10 CORONARY ARTERY DISEASE INVOLVING NATIVE CORONARY ARTERY OF NATIVE HEART WITHOUT ANGINA PECTORIS: ICD-10-CM

## 2022-02-08 DIAGNOSIS — Z98.890 HISTORY OF CARDIAC CATHETERIZATION: ICD-10-CM

## 2022-02-09 RX ORDER — NITROGLYCERIN 0.4 MG/1
0.4 TABLET SUBLINGUAL EVERY 5 MIN PRN
Qty: 25 TABLET | Refills: 3 | Status: SHIPPED | OUTPATIENT
Start: 2022-02-09 | End: 2023-01-13

## 2022-02-09 NOTE — TELEPHONE ENCOUNTER
"Requested Prescriptions   Pending Prescriptions Disp Refills     nitroGLYcerin (NITROSTAT) 0.4 MG sublingual tablet [Pharmacy Med Name: nitroglycerin 0.4 mg sublingual tablet] 25 tablet 3     Sig: Place 1 tablet (0.4 mg) under the tongue every 5 minutes as needed for chest pain 1 tablet under the tongue every 5 minutes for 3 doses.       Nitrates Failed - 2/8/2022  4:25 PM        Failed - Recent (12 mo) or future (30 days) visit within the authorizing provider's specialty     Patient has had an office visit with the authorizing provider or a provider within the authorizing providers department within the previous 12 mos or has a future within next 30 days. See \"Patient Info\" tab in inbasket, or \"Choose Columns\" in Meds & Orders section of the refill encounter.              Failed - Sublingual nitro order needs review     If refill exceeds 1 bottle per month, please forward request to provider.           Passed - Blood pressure under 140/90 in past 12 months     BP Readings from Last 3 Encounters:   01/11/22 124/68   08/25/21 138/72   02/04/21 134/72                 Passed - Pt is not on erectile dysfunction medications        Passed - Medication is active on med list        Passed - Patient is age 18 or older           Last Written Prescription Date:  6/4/2020  Last Fill Quantity: 25,   # refills: 3  Last Office Visit: 6/4/2020  Future Office visit:    Next 5 appointments (look out 90 days)    Feb 23, 2022  1:15 PM  Return Visit with Romel Ray MD  [facility] ([facility] ) [address]  [place] [id number] [phone]     Routing refill request to provider for review/approval because:  Overdue for an appointment    Unable to complete prescription refill per RN Medication Refill Policy.   Ashley Hansen RN ....................  2/9/2022   2:46 PM   "

## 2022-02-10 ENCOUNTER — TRANSFERRED RECORDS (OUTPATIENT)
Dept: HEALTH INFORMATION MANAGEMENT | Facility: OTHER | Age: 77
End: 2022-02-10
Payer: COMMERCIAL

## 2022-02-15 ENCOUNTER — TRANSFERRED RECORDS (OUTPATIENT)
Dept: HEALTH INFORMATION MANAGEMENT | Facility: OTHER | Age: 77
End: 2022-02-15
Payer: COMMERCIAL

## 2022-02-16 ENCOUNTER — MYC MEDICAL ADVICE (OUTPATIENT)
Dept: FAMILY MEDICINE | Facility: OTHER | Age: 77
End: 2022-02-16
Payer: COMMERCIAL

## 2022-02-16 ENCOUNTER — TELEPHONE (OUTPATIENT)
Dept: LAB | Facility: OTHER | Age: 77
End: 2022-02-16
Payer: COMMERCIAL

## 2022-02-16 DIAGNOSIS — Z85.46 HISTORY OF PROSTATE CANCER: Primary | ICD-10-CM

## 2022-02-16 NOTE — TELEPHONE ENCOUNTER
"Per last office visit  8/5/21 with Romel Ray MD \"Plan  Continue active surveillance for prostate cancer with PSA q6 months\"        Orders Placed    "
actual/standing

## 2022-02-17 ENCOUNTER — PATIENT OUTREACH (OUTPATIENT)
Dept: CARE COORDINATION | Facility: CLINIC | Age: 77
End: 2022-02-17
Payer: COMMERCIAL

## 2022-02-17 NOTE — PROGRESS NOTES
"Transitional Care Management Phone Call  Verified patient's name/.      Summary of hospitalization:  See outside records, reviewed and scanned- [facility]'s [place]    DISCHARGE DIAGNOSIS: carotid stenosis    DATE OF DISCHARGE: 2022    Diagnostic Tests/Treatments reviewed.    Follow up:    Vascular 1 month with Ultrasound     Post Discharge Medication Reconciliation: discharge medications reconciled and changed, per note/orders.  Medications reviewed by: by myself    Problems taking medications regularly:  None    Problems adhering to non-medication therapy:  None    Other Healthcare Providers Involved in Patient s Care:         Specialist appointment - vasucal surgery     Update since discharge: improved.   Pt shares, \"I am doing very well. Tell Dr. Zayas thank you and I appreciate him for the imaging that lead me to finding my blockage.\" Pt denies taking Norco, experiencing very little to no pain. Denies restriction of ROM of neck. Outer bandage removed .  Steri strips in place but does look like they are coming loose. Denies drainage or redness at incision site, left neck. Denies fevers, chills. SOB, CP. Reports feels so much better now after this procedure.     Medications reviewed.  Changes/updates/question    mg daily (added as pt reported) instead of 81 mg (noted not taking).     Pt reports his bp today was 113/70 HR 68, he is taking Losartan 100 mg daily, confirmed dosage. Is wondering if this will be too much, if he should decrease to 50 mg?   Discharge summary from [facility] indicates 50 mg.     Plan of care communicated with patient  Just a friendly reminder that you appointment is   Next 5 appointments (look out 90 days)    2022  1:15 PM  Return Visit with Romel Ray MD  [facility] ([facility] ) [address]  [place] [id number] [phone]      .  We encourage you to keep this " appointment.    Please remember to bring all of your pills in their bottles (including any vitamins or over the counter pills) with you to your appointment.     The patient indicates understanding of these issues and agrees with the plan of care.   Yes, plans to follow plan of care and keep the scheduled appointment.    Was the patient contacted within the 2 business days or other approved timeframe?  Yes    Was the Medication reconciliation and management done since the patient was discharged? Yes    Denise Wren RN  2/17/2022 10:51 AM  tcm completed. Denise Wren RN ,....................  2/18/2022   9:38 AM

## 2022-02-18 NOTE — TELEPHONE ENCOUNTER
It would be reasonable to reduce losartan to 50 mg daily given his blood pressure readings. Continue to keep track. Ideal blood pressure is under 130/80

## 2022-02-22 NOTE — TELEPHONE ENCOUNTER
Called patient and verified name and , patient states he is pleased to have his dose lowered and had no further questions or concerns.  ....Ryan Mendez LPN on 2022 at 8:08 AM

## 2022-02-23 ENCOUNTER — LAB (OUTPATIENT)
Dept: LAB | Facility: OTHER | Age: 77
End: 2022-02-23
Attending: UROLOGY
Payer: COMMERCIAL

## 2022-02-23 ENCOUNTER — OFFICE VISIT (OUTPATIENT)
Dept: UROLOGY | Facility: OTHER | Age: 77
End: 2022-02-23
Attending: UROLOGY
Payer: COMMERCIAL

## 2022-02-23 VITALS
HEART RATE: 83 BPM | BODY MASS INDEX: 31.01 KG/M2 | OXYGEN SATURATION: 97 % | SYSTOLIC BLOOD PRESSURE: 120 MMHG | DIASTOLIC BLOOD PRESSURE: 72 MMHG | RESPIRATION RATE: 16 BRPM | WEIGHT: 210 LBS

## 2022-02-23 DIAGNOSIS — R35.0 BENIGN PROSTATIC HYPERPLASIA WITH URINARY FREQUENCY: Primary | ICD-10-CM

## 2022-02-23 DIAGNOSIS — N40.1 BENIGN PROSTATIC HYPERPLASIA WITH URINARY FREQUENCY: Primary | ICD-10-CM

## 2022-02-23 DIAGNOSIS — Z85.46 HISTORY OF PROSTATE CANCER: ICD-10-CM

## 2022-02-23 DIAGNOSIS — C61 PROSTATE CANCER (H): ICD-10-CM

## 2022-02-23 LAB — PSA SERPL-MCNC: 7.09 UG/L (ref 0–4)

## 2022-02-23 PROCEDURE — 84153 ASSAY OF PSA TOTAL: CPT | Mod: ZL

## 2022-02-23 PROCEDURE — 36415 COLL VENOUS BLD VENIPUNCTURE: CPT | Mod: ZL

## 2022-02-23 PROCEDURE — G0463 HOSPITAL OUTPT CLINIC VISIT: HCPCS

## 2022-02-23 PROCEDURE — 99214 OFFICE O/P EST MOD 30 MIN: CPT | Performed by: UROLOGY

## 2022-02-23 RX ORDER — TAMSULOSIN HYDROCHLORIDE 0.4 MG/1
0.4 CAPSULE ORAL EVERY EVENING
Qty: 90 CAPSULE | Refills: 3 | Status: SHIPPED | OUTPATIENT
Start: 2022-02-23 | End: 2022-05-11

## 2022-02-23 ASSESSMENT — PAIN SCALES - GENERAL: PAINLEVEL: NO PAIN (0)

## 2022-02-23 NOTE — NURSING NOTE
Chief Complaint   Patient presents with     Follow Up     6 month follow up History of Prostate cancer   Patient presents to the clinic today for a 6 month follow up for History of prostate cancer    Review of Systems:    Weight loss:    No     Recent fever/chills:  No   Night sweats:   No  Current skin rash:  No   Recent hair loss:  No  Heat intolerance:  No   Cold intolerance:  No  Chest pain:   No   Palpitations:   No  Shortness of breath:  No   Wheezing:   No  Constipation:    No   Diarrhea:   No   Nausea:   No   Vomiting:   No   Kidney/side pain:  No   Back pain:   No  Frequent headaches:  No   Dizziness:     No  Leg swelling:   No   Calf pain:    No        Medication Reconciliation: completed   Medina Bojorquez LPN  2/23/2022 11:54 AM

## 2022-02-23 NOTE — PROGRESS NOTES
Type of Visit  EST    Chief Complaint  Prostate cancer on active surveillance  BPH with nocturia    HPI  Mr. Lovell is a 76 y.o. male with clinical low risk prostate cancer (since 2013) on active surveillance.   About 1 week ago he underwent left CEA.  Surgery was successful and he is recovering well.    Prior to the past 6 months he did undergo aortic valve replacement.  He denies unintentional weight loss, bone or pelvic pain.  He underwent a PSA earlier and follows up to review the results.  He denies dysuria or gross hematuria.    He also has mild urinary symptoms that have been progressively worsening over the past few years.  He has never used treatment.  He is considering treatment at this time given the symptoms have progressed.  He denies dysuria and gross hematuria.  He primarily complains of weak stream and nocturia.      Review of Systems  I reviewed the ROS the patient today.    Nursing Notes:   Medina Bojorquez LPN  2/23/2022 11:55 AM  Signed  Chief Complaint   Patient presents with     Follow Up     6 month follow up History of Prostate cancer   Patient presents to the clinic today for a 6 month follow up for History of prostate cancer    Review of Systems:    Weight loss:    No     Recent fever/chills:  No   Night sweats:   No  Current skin rash:  No   Recent hair loss:  No  Heat intolerance:  No   Cold intolerance:  No  Chest pain:   No   Palpitations:   No  Shortness of breath:  No   Wheezing:   No  Constipation:    No   Diarrhea:   No   Nausea:   No   Vomiting:   No   Kidney/side pain:  No   Back pain:   No  Frequent headaches:  No   Dizziness:     No  Leg swelling:   No   Calf pain:    No        Medication Reconciliation: completed   Medina Bojorquez LPN  2/23/2022 11:54 AM     Physical Exam  /72 (BP Location: Right arm, Patient Position: Sitting, Cuff Size: Adult Large)   Pulse 83   Resp 16   Wt 95.3 kg (210 lb)   SpO2 97%   BMI 31.01 kg/m    Constitutional: NAD, WDWN.  Cardiovascular:  Regular rate.  Pulmonary/Chest: Respirations are even and non-labored bilaterally.  Abdominal: Soft. No distension, tenderness, masses or guarding. No CVA tenderness.  Extremities: GLORIA x 4, Warm. No clubbing.  No cyanosis.    Skin: Pink, warm and dry.  No rashes noted.  Genitourinary:  Nonpalpable bladder  Prostate:  40 grams.  Symmetric, non-tender, and no induration.      Labs  Results for CARMELITA WINTERSJULISA CHRISTENSEN (MRN [id number]) as of 2/23/2022 12:04   2/23/2022 09:35   PSA 7.09 (H)     Results for VIANEY LIBORIOJULISA CHRISTENSEN (MRN [id number]) as of 8/25/2021 14:16   8/5/2021 09:50   PSA 4.36 (H)     Results for VIANEY, LIBORIOJULISA CHRISTENSEN (MRN [id number]) as of 2/4/2021 13:48   2/4/2021 10:58   PSA 4.551 (H)     Results for LIBORIO WINTERS (MRN [id number]) as of 8/3/2020 13:36   8/3/2020 09:53   PSA 4.154 (H)     Results for VIANEY, LIBORIO FERMIN (MRN [id number]) as of 2/3/2020 13:22   2/3/2020 09:52   PSA 4.744 (H)     Results for VIANEY, LIBORIOJULISA CHRISTENSEN (MRN [id number]) as of 7/29/2019 12:00   2/18/2019 09:51 7/29/2019 08:24   PSA 4.337 (H) 3.869 (H)     Results for VIANEY, LIBORIO FERMIN (MRN [id number]) as of 8/24/2018 11:26   7/25/2018 11:17   PSA 4.908 (H)     Results for LIBORIO WINTERS (MRN [id number]) as of 1/15/2018 10:05   9/24/2014 11:10 3/25/2015 11:00   PSA 2.450 2.680     Pathology  12/16/2015  Grade 3+3=6 prostate cancer in 2/12 cores (80%)    10/9/2013  Grade 3+3=6 prostate cancer in 2/12 cores (15-20%)    Oncotype Dx    (from the 10/2013 specimen)  GPS:     24  Likelihood of favorable pathology: 61%    PSA:       3.389  Calculated prostate volume based on TRUS: 56 grams  PSA Density:      0.06 ng/mL/g  T stage:      cT2a    Assessment  Mr. Winters is a 76 y.o. male with initial low risk prostate cancer (cT2a) on active surveillance.  PSA increased somewhat since the last visit.  I recommended we continue to trend his PSA.  If the trajectory continues I may recommend a prostate MRI.    Regarding BPH with  nocturia we discussed an alpha-blocker.  I recommended he hold the medication and not start it until he has recovered from recent carotid surgery.  We discussed side effects of Flomax including, but not limited to, retrograde ejaculation, congestion and lightheadedness.    Plan  Start Flomax 0.4mg every evening  Continue active surveillance for prostate cancer with PSA q6 months

## 2022-02-28 ENCOUNTER — MYC MEDICAL ADVICE (OUTPATIENT)
Dept: FAMILY MEDICINE | Facility: OTHER | Age: 77
End: 2022-02-28
Payer: COMMERCIAL

## 2022-02-28 DIAGNOSIS — I71.21 ASCENDING AORTIC ANEURYSM (H): ICD-10-CM

## 2022-02-28 DIAGNOSIS — I10 BENIGN ESSENTIAL HYPERTENSION: ICD-10-CM

## 2022-02-28 RX ORDER — LOSARTAN POTASSIUM 50 MG/1
50 TABLET ORAL DAILY
Qty: 90 TABLET | Refills: 3 | Status: SHIPPED | OUTPATIENT
Start: 2022-02-28 | End: 2022-12-01

## 2022-03-25 ENCOUNTER — TRANSFERRED RECORDS (OUTPATIENT)
Dept: HEALTH INFORMATION MANAGEMENT | Facility: OTHER | Age: 77
End: 2022-03-25
Payer: COMMERCIAL

## 2022-04-27 ENCOUNTER — ALLIED HEALTH/NURSE VISIT (OUTPATIENT)
Dept: FAMILY MEDICINE | Facility: OTHER | Age: 77
End: 2022-04-27
Attending: FAMILY MEDICINE
Payer: COMMERCIAL

## 2022-04-27 DIAGNOSIS — Z23 HIGH PRIORITY FOR 2019-NCOV VACCINE: Primary | ICD-10-CM

## 2022-04-27 PROCEDURE — 91305 COVID-19,PF,PFIZER (12+ YRS): CPT

## 2022-05-11 ENCOUNTER — MYC MEDICAL ADVICE (OUTPATIENT)
Dept: FAMILY MEDICINE | Facility: OTHER | Age: 77
End: 2022-05-11
Payer: COMMERCIAL

## 2022-05-11 ENCOUNTER — VIRTUAL VISIT (OUTPATIENT)
Dept: FAMILY MEDICINE | Facility: OTHER | Age: 77
End: 2022-05-11
Attending: FAMILY MEDICINE
Payer: COMMERCIAL

## 2022-05-11 DIAGNOSIS — U07.1 INFECTION DUE TO 2019 NOVEL CORONAVIRUS: Primary | ICD-10-CM

## 2022-05-11 PROCEDURE — 99212 OFFICE O/P EST SF 10 MIN: CPT | Mod: 95 | Performed by: FAMILY MEDICINE

## 2022-05-11 NOTE — TELEPHONE ENCOUNTER
They can get paxlovid in town. He is on at least 2 medications that have to be stopped for Paxlovid, so I need a phone note if we are going to use it. May not qualify and I can figure that out in a phone appointment

## 2022-05-11 NOTE — PROGRESS NOTES
Praveen is a 76 year old who is being evaluated via a billable telephone visit.      What phone number would you like to be contacted at? [phone]  How would you like to obtain your AVS? MyChart    Assessment & Plan       ICD-10-CM    1. Infection due to 2019 novel coronavirus  U07.1 nirmatrelvir and ritonavir (PAXLOVID) therapy pack     He has a history of CAD, hypertension, and age as risk factors for worse COVID outcomes.   Interested in Paxlovid. Reviewed drug interactions. Has some errors in medications under [facility] from [facility] chart. Is not on Xarelto, was in 2018, but stopped  Tamsulosin made him lightheaded, so he stopped  Only needs to hold Crestor for 5 days while on Paxlovid, then resume  Will need to get Paxlovid at [facility], not available at [facility]  Seek evaluation if worse      Yan Zayas MD  [facility]    Subjective   Praveen is a 76 year old who presents for the following health issues  accompanied by his spouse.    HPI     COVID positive 2 days ago  Had an appointment in [place], with systemic symptoms like ST, congestion, malaise. Some cough. Minimal SOB.  Dr Jimenez prescribed ivermectin  Patient interested in Paxlovid    Review of Systems         Objective           Vitals:  No vitals were obtained today due to virtual visit.    Physical Exam   healthy, alert and no distress  PSYCH: Alert and oriented times 3; coherent speech, normal   rate and volume, able to articulate logical thoughts, able   to abstract reason, no tangential thoughts, no hallucinations   or delusions  His affect is normal  RESP: No cough, no audible wheezing, able to talk in full sentences  Remainder of exam unable to be completed due to telephone visits                Phone call duration: 14 minutes

## 2022-06-02 DIAGNOSIS — C61 PROSTATE CANCER (H): Primary | ICD-10-CM

## 2022-06-02 NOTE — PROGRESS NOTES
"Per last office visit  2/23/22 with Romel Ray MD \"Plan  Start Flomax 0.4mg every evening  Continue active surveillance for prostate cancer with PSA q6 months\"        Orders Placed    "

## 2022-08-01 NOTE — TELEPHONE ENCOUNTER
Amlodipine (NORVASC) 2.5 mg tablet  Last Written Prescription Date:  5/8/17  Last Fill Quantity: 90,   # refills: 3  Last Office Visit: 5/9/17  Future Office visit:    Next 5 appointments (look out 90 days)     Jul 17, 2018  9:30 AM CDT   [facility]    [address] [address]   [phone]             Prescription approved per [facility] Refill Protocol for 30 day supply at this time. Macey Clarke RN .............. 6/25/2018  8:10 AM   Heterosexual

## 2022-08-25 ENCOUNTER — LAB (OUTPATIENT)
Dept: LAB | Facility: OTHER | Age: 77
End: 2022-08-25
Attending: UROLOGY
Payer: COMMERCIAL

## 2022-08-25 ENCOUNTER — OFFICE VISIT (OUTPATIENT)
Dept: UROLOGY | Facility: OTHER | Age: 77
End: 2022-08-25
Attending: UROLOGY
Payer: COMMERCIAL

## 2022-08-25 VITALS
TEMPERATURE: 97.1 F | SYSTOLIC BLOOD PRESSURE: 110 MMHG | OXYGEN SATURATION: 95 % | HEART RATE: 91 BPM | RESPIRATION RATE: 16 BRPM | DIASTOLIC BLOOD PRESSURE: 64 MMHG

## 2022-08-25 DIAGNOSIS — C61 PROSTATE CANCER (H): Primary | ICD-10-CM

## 2022-08-25 DIAGNOSIS — R35.0 BENIGN PROSTATIC HYPERPLASIA WITH URINARY FREQUENCY: ICD-10-CM

## 2022-08-25 DIAGNOSIS — N40.1 BENIGN PROSTATIC HYPERPLASIA WITH URINARY FREQUENCY: ICD-10-CM

## 2022-08-25 DIAGNOSIS — C61 PROSTATE CANCER (H): ICD-10-CM

## 2022-08-25 LAB — PSA SERPL-MCNC: 4.82 UG/L (ref 0–4)

## 2022-08-25 PROCEDURE — 36415 COLL VENOUS BLD VENIPUNCTURE: CPT | Mod: ZL

## 2022-08-25 PROCEDURE — G0463 HOSPITAL OUTPT CLINIC VISIT: HCPCS | Performed by: UROLOGY

## 2022-08-25 PROCEDURE — 84153 ASSAY OF PSA TOTAL: CPT | Mod: ZL

## 2022-08-25 PROCEDURE — 99213 OFFICE O/P EST LOW 20 MIN: CPT | Performed by: UROLOGY

## 2022-08-25 ASSESSMENT — PAIN SCALES - GENERAL: PAINLEVEL: NO PAIN (0)

## 2022-08-25 NOTE — PROGRESS NOTES
Type of Visit  EST    Chief Complaint  Prostate cancer on active surveillance  BPH with nocturia    HPI  Mr. Lovell is a 77 y.o. male with clinical low risk prostate cancer (since 2013) on active surveillance.   The patient has been following up with PSA checks every 6 months.  His last PSA bumped a bit higher than previous levels.  This did not prompt any additional testing or work-up.  He did undergo a PSA prior to this visit.  He continues to deny unintentional weight loss bone or pelvic pain    He also has mild urinary symptoms that have been progressively worsening over the past few years.  He has never used treatment.  He is considering treatment at this time given the symptoms have progressed.  He denies dysuria and gross hematuria.  He primarily complains of weak stream and nocturia.      Review of Systems  I reviewed the ROS the patient today.    Nursing Notes:   Gracia Bustillo LPN  8/25/2022 11:53 AM  Signed  Chief Complaint   Patient presents with     Follow Up     6 month follow up hx prostate cx       Medication reconciliation completed.    Review of Systems:    Weight loss:    No     Recent fever/chills:  No   Night sweats:   No  Current skin rash:  No   Recent hair loss:  No  Heat intolerance:  No   Cold intolerance:  No  Chest pain:   No   Palpitations:   No  Shortness of breath:  No   Wheezing:   No  Constipation:    No   Diarrhea:   No   Nausea:   No   Vomiting:   No   Kidney/side pain:  No   Back pain:   No  Frequent headaches:  No   Dizziness:     No  Leg swelling:   No   Calf pain:    No        Gracia Bustillo LPN .......  8/25/2022  11:38 AM    Physical Exam  /64 (BP Location: Right arm, Patient Position: Sitting, Cuff Size: Adult Regular)   Pulse 91   Temp 97.1  F (36.2  C) (Temporal)   Resp 16   SpO2 95%   Constitutional: NAD, WDWN.  Pulmonary/Chest: Respirations are even and non-labored bilaterally.  Abdominal: Soft. No distension, tenderness, masses or guarding. No CVA  tenderness.  Extremities: GLORIA x 4, Warm. No clubbing.  No cyanosis.    Skin: Pink, warm and dry.  No rashes noted.  Genitourinary:  Nonpalpable bladder  Prostate:  40 grams.  Symmetric, non-tender, and no induration.      Labs   02/23/22 09:35 08/25/22 09:39   PSA 7.09 (H) 4.82 (H)     Results for LIBORIO LOVELL (MRN [id number]) as of 8/25/2021 14:16   8/5/2021 09:50   PSA 4.36 (H)     Results for LIBORIO LOVELL (MRN [id number]) as of 2/4/2021 13:48   2/4/2021 10:58   PSA 4.551 (H)     Results for LIBORIO LOVELL (MRN [id number]) as of 8/3/2020 13:36   8/3/2020 09:53   PSA 4.154 (H)     Results for LIBORIO LOVELL (MRN [id number]) as of 2/3/2020 13:22   2/3/2020 09:52   PSA 4.744 (H)     Results for LIBORIO LOVELL (MRN [id number]) as of 7/29/2019 12:00   2/18/2019 09:51 7/29/2019 08:24   PSA 4.337 (H) 3.869 (H)     Results for LIBORIO LOVELL (MRN [id number]) as of 8/24/2018 11:26   7/25/2018 11:17   PSA 4.908 (H)     Results for LIBORIO LOVELL (MRN [id number]) as of 1/15/2018 10:05   9/24/2014 11:10 3/25/2015 11:00   PSA 2.450 2.680     Pathology  12/16/2015  Grade 3+3=6 prostate cancer in 2/12 cores (80%)    10/9/2013  Grade 3+3=6 prostate cancer in 2/12 cores (15-20%)    Oncotype Dx    (from the 10/2013 specimen)  GPS:     24  Likelihood of favorable pathology: 61%    PSA:       3.389  Calculated prostate volume based on TRUS: 56 grams  PSA Density:      0.06 ng/mL/g  T stage:      cT2a    Assessment  Mr. Lovell is a 77 y.o. male with initial low risk prostate cancer (cT2a) on active surveillance.  PSA today was reassuring and confirmed the prior value was a bit of an inflammatory bump.    Plan  Continue Flomax 0.4mg every evening  Continue active surveillance for prostate cancer with PSA q6 months

## 2022-08-25 NOTE — NURSING NOTE
Chief Complaint   Patient presents with     Follow Up     6 month follow up hx prostate cx       Medication reconciliation completed.    Review of Systems:    Weight loss:    No     Recent fever/chills:  No   Night sweats:   No  Current skin rash:  No   Recent hair loss:  No  Heat intolerance:  No   Cold intolerance:  No  Chest pain:   No   Palpitations:   No  Shortness of breath:  No   Wheezing:   No  Constipation:    No   Diarrhea:   No   Nausea:   No   Vomiting:   No   Kidney/side pain:  No   Back pain:   No  Frequent headaches:  No   Dizziness:     No  Leg swelling:   No   Calf pain:    No        Gracia Bustillo LPN .......  8/25/2022  11:38 AM

## 2022-10-07 ENCOUNTER — IMMUNIZATION (OUTPATIENT)
Dept: FAMILY MEDICINE | Facility: OTHER | Age: 77
End: 2022-10-07
Attending: FAMILY MEDICINE
Payer: COMMERCIAL

## 2022-10-07 DIAGNOSIS — Z23 ENCOUNTER FOR IMMUNIZATION: Primary | ICD-10-CM

## 2022-10-07 PROCEDURE — 91312 COVID-19,PF,PFIZER BOOSTER BIVALENT: CPT

## 2022-10-07 PROCEDURE — G0008 ADMIN INFLUENZA VIRUS VAC: HCPCS

## 2022-10-07 NOTE — ADDENDUM NOTE
Addended by: JACOBO MONTENEGRO on: 10/7/2022 11:40 AM     Modules accepted: Orders, Level of Service

## 2022-10-29 ENCOUNTER — HEALTH MAINTENANCE LETTER (OUTPATIENT)
Age: 77
End: 2022-10-29

## 2022-11-17 DIAGNOSIS — I10 BENIGN ESSENTIAL HYPERTENSION: ICD-10-CM

## 2022-11-17 DIAGNOSIS — I71.21 ASCENDING AORTIC ANEURYSM (H): ICD-10-CM

## 2022-11-21 RX ORDER — METOPROLOL SUCCINATE 25 MG/1
TABLET, EXTENDED RELEASE ORAL
Qty: 45 TABLET | Refills: 3 | Status: SHIPPED | OUTPATIENT
Start: 2022-11-21 | End: 2023-01-13

## 2022-11-21 NOTE — TELEPHONE ENCOUNTER
[facility] sent Rx request for the following:      Requested Prescriptions   Pending Prescriptions Disp Refills     metoprolol succinate ER (TOPROL XL) 25 MG 24 hr tablet [Pharmacy Med Name: metoprolol succinate ER 25 mg tablet,extended release 24 hr] 45 tablet 3     Sig: TAKE 1/2 TABLET (12.5 MG) BY MOUTH DAILY       Beta-Blockers Protocol Passed - 11/21/2022 10:45 AM       Last Prescription Date:   1/11/22  Last Fill Qty/Refills:         45, R-3    Last Office Visit:              1/11/22   Future Office visit:           None noted    DEVI ALVARADO RN ....................  11/21/2022   10:47 AM

## 2022-11-30 DIAGNOSIS — I71.21 ASCENDING AORTIC ANEURYSM (H): ICD-10-CM

## 2022-11-30 DIAGNOSIS — I10 BENIGN ESSENTIAL HYPERTENSION: ICD-10-CM

## 2022-12-01 RX ORDER — LOSARTAN POTASSIUM 50 MG/1
50 TABLET ORAL DAILY
Qty: 90 TABLET | Refills: 4 | Status: SHIPPED | OUTPATIENT
Start: 2022-12-01 | End: 2023-01-13

## 2022-12-01 NOTE — TELEPHONE ENCOUNTER
"Patient coming due for annual visit and labs. Message left for patient requesting a return call to remind him.     Filled 02/28/22 #90 x 3. Due 02/28/23   Disp Refills Start End PORTER   losartan (COZAAR) 50 MG tablet 90 tablet 3 2/28/2022  No   Sig - Route: Take 1 tablet (50 mg) by mouth daily - Oral   Sent to pharmacy as: Losartan Potassium 50 MG Oral Tablet (COZAAR)   Class: E-Prescribe   Notes to Pharmacy: Dose reduction   Order: [id number]   E-Prescribing Status: Receipt confirmed by pharmacy (2/28/2022 12:41 PM CST)     [facility] - [place], [place] - [address]       Last Office Visit: 01/11/2022  Future Office visit:    Next 5 appointments (look out 90 days)    Mar 01, 2023 11:30 AM  Return Visit with Romel Ray MD  [facility] ([facility] ) [address]  [place] [id number] [phone]         Requested Prescriptions   Pending Prescriptions Disp Refills     losartan (COZAAR) 50 MG tablet [Pharmacy Med Name: losartan 50 mg tablet] 90 tablet 4     Sig: Take 1 tablet (50 mg) by mouth daily       Angiotensin-II Receptors Passed - 11/30/2022  1:31 PM        Passed - Last blood pressure under 140/90 in past 12 months     BP Readings from Last 3 Encounters:   08/25/22 110/64   02/23/22 120/72   01/11/22 124/68                 Passed - Recent (12 mo) or future (30 days) visit within the authorizing provider's specialty     Patient has had an office visit with the authorizing provider or a provider within the authorizing providers department within the previous 12 mos or has a future within next 30 days. See \"Patient Info\" tab in inbasket, or \"Choose Columns\" in Meds & Orders section of the refill encounter.              Passed - Medication is active on med list        Passed - Patient is age 18 or older        Passed - Normal serum creatinine on file in past 12 months     Recent Labs   Lab Test 01/11/22  1222   CR 1.01 "       Ok to refill medication if creatinine is low          Passed - Normal serum potassium on file in past 12 months     Recent Labs   Lab Test 01/11/22  1222   POTASSIUM 4.4                         Routing refill request to provider for review/approval.

## 2023-01-11 NOTE — PROGRESS NOTES
Pre-Visit Planning   Next 5 appointments (look out 90 days)    Jan 13, 2023  1:00 PM  PHYSICAL with Yan Zayas MD  [facility] ([facility] ) [address]  [place] [id number] [phone]   Mar 01, 2023 11:30 AM  Return Visit with Romel Ray MD  [facility] ([facility] ) [address]  [place] [id number] [phone]        Appointment Notes for this encounter:   medicare wellness    Questionnaires Reviewed/Assigned  Additional questionnaires assigned: phq2, fall assessment    Patient preferred phone number: [phone]      Contacted patient via phone/[employer]. Are there any additional questions or concerns you'd like to review with your provider during your visit? No  -states he received influenza vaccine about one month ago    Visit is preventive. Reviewed purpose of preventive visit with patient.    Meds  Home Meds reviewed and updated  Refills pended    Entered patient-preferred pharmacy.     Current Outpatient Medications   Medication     amoxicillin (AMOXIL) 500 MG capsule     aspirin (ASA) 325 MG EC tablet     Black Pepper-Turmeric (TURMERIC CURCUMIN) 5-1000 MG CAPS     diphenhydrAMINE-acetaminophen (TYLENOL PM)  MG tablet     losartan (COZAAR) 50 MG tablet     metoprolol succinate ER (TOPROL XL) 25 MG 24 hr tablet     nitroGLYcerin (NITROSTAT) 0.4 MG sublingual tablet     rosuvastatin (CRESTOR) 20 MG tablet     vitamin D3 (CHOLECALCIFEROL) 50 mcg (2000 units) tablet     No current facility-administered medications for this visit.     [employer]  Patient is active on [employer].    Questionnaire Review   Offered information on completing questionnaires via [employer].  Advised patient to arrive early in order to complete questionnaires.    Call Summary  Advised patient to call back at [phone] if needed.     Corinne R Thayer, RN on 1/11/2023 at  4:16 PM

## 2023-01-13 ENCOUNTER — OFFICE VISIT (OUTPATIENT)
Dept: FAMILY MEDICINE | Facility: OTHER | Age: 78
End: 2023-01-13
Attending: FAMILY MEDICINE
Payer: COMMERCIAL

## 2023-01-13 VITALS
WEIGHT: 212 LBS | OXYGEN SATURATION: 96 % | SYSTOLIC BLOOD PRESSURE: 130 MMHG | BODY MASS INDEX: 31.31 KG/M2 | HEART RATE: 67 BPM | DIASTOLIC BLOOD PRESSURE: 70 MMHG | RESPIRATION RATE: 17 BRPM | TEMPERATURE: 96.9 F

## 2023-01-13 DIAGNOSIS — I71.21 ANEURYSM OF ASCENDING AORTA WITHOUT RUPTURE (H): ICD-10-CM

## 2023-01-13 DIAGNOSIS — Z98.890 HISTORY OF CARDIAC CATHETERIZATION: ICD-10-CM

## 2023-01-13 DIAGNOSIS — C61 PROSTATE CANCER (H): ICD-10-CM

## 2023-01-13 DIAGNOSIS — Z00.00 ENCOUNTER FOR MEDICARE ANNUAL WELLNESS EXAM: Primary | ICD-10-CM

## 2023-01-13 DIAGNOSIS — E55.9 VITAMIN D INSUFFICIENCY: ICD-10-CM

## 2023-01-13 DIAGNOSIS — E78.2 MIXED HYPERLIPIDEMIA: ICD-10-CM

## 2023-01-13 DIAGNOSIS — Z95.2 S/P TAVR (TRANSCATHETER AORTIC VALVE REPLACEMENT): ICD-10-CM

## 2023-01-13 DIAGNOSIS — Z95.5 H/O HEART ARTERY STENT: ICD-10-CM

## 2023-01-13 DIAGNOSIS — I65.22 CAROTID STENOSIS, LEFT: ICD-10-CM

## 2023-01-13 DIAGNOSIS — I10 BENIGN ESSENTIAL HYPERTENSION: ICD-10-CM

## 2023-01-13 DIAGNOSIS — I51.7 ASYMMETRIC SEPTAL HYPERTROPHY: ICD-10-CM

## 2023-01-13 DIAGNOSIS — I25.10 CORONARY ARTERY DISEASE INVOLVING NATIVE CORONARY ARTERY OF NATIVE HEART WITHOUT ANGINA PECTORIS: ICD-10-CM

## 2023-01-13 LAB
ALBUMIN SERPL BCG-MCNC: 4.7 G/DL (ref 3.5–5.2)
ALP SERPL-CCNC: 102 U/L (ref 40–129)
ALT SERPL W P-5'-P-CCNC: 21 U/L (ref 10–50)
ANION GAP SERPL CALCULATED.3IONS-SCNC: 9 MMOL/L (ref 7–15)
AST SERPL W P-5'-P-CCNC: 27 U/L (ref 10–50)
BASOPHILS # BLD AUTO: 0.1 10E3/UL (ref 0–0.2)
BASOPHILS NFR BLD AUTO: 1 %
BILIRUB SERPL-MCNC: 0.6 MG/DL
BUN SERPL-MCNC: 18.7 MG/DL (ref 8–23)
CALCIUM SERPL-MCNC: 9.4 MG/DL (ref 8.8–10.2)
CHLORIDE SERPL-SCNC: 105 MMOL/L (ref 98–107)
CHOLEST SERPL-MCNC: 98 MG/DL
CREAT SERPL-MCNC: 1.03 MG/DL (ref 0.67–1.17)
DEPRECATED HCO3 PLAS-SCNC: 25 MMOL/L (ref 22–29)
EOSINOPHIL # BLD AUTO: 0.2 10E3/UL (ref 0–0.7)
EOSINOPHIL NFR BLD AUTO: 3 %
ERYTHROCYTE [DISTWIDTH] IN BLOOD BY AUTOMATED COUNT: 12.9 % (ref 10–15)
GFR SERPL CREATININE-BSD FRML MDRD: 74 ML/MIN/1.73M2
GLUCOSE SERPL-MCNC: 100 MG/DL (ref 70–99)
HCT VFR BLD AUTO: 41.6 % (ref 40–53)
HDLC SERPL-MCNC: 45 MG/DL
HGB BLD-MCNC: 14.2 G/DL (ref 13.3–17.7)
IMM GRANULOCYTES # BLD: 0 10E3/UL
IMM GRANULOCYTES NFR BLD: 0 %
LDLC SERPL CALC-MCNC: 39 MG/DL
LYMPHOCYTES # BLD AUTO: 1.3 10E3/UL (ref 0.8–5.3)
LYMPHOCYTES NFR BLD AUTO: 17 %
MCH RBC QN AUTO: 30.5 PG (ref 26.5–33)
MCHC RBC AUTO-ENTMCNC: 34.1 G/DL (ref 31.5–36.5)
MCV RBC AUTO: 90 FL (ref 78–100)
MONOCYTES # BLD AUTO: 0.7 10E3/UL (ref 0–1.3)
MONOCYTES NFR BLD AUTO: 9 %
NEUTROPHILS # BLD AUTO: 5.2 10E3/UL (ref 1.6–8.3)
NEUTROPHILS NFR BLD AUTO: 70 %
NONHDLC SERPL-MCNC: 53 MG/DL
NRBC # BLD AUTO: 0 10E3/UL
NRBC BLD AUTO-RTO: 0 /100
PLATELET # BLD AUTO: 151 10E3/UL (ref 150–450)
POTASSIUM SERPL-SCNC: 4.6 MMOL/L (ref 3.4–5.3)
PROT SERPL-MCNC: 7.1 G/DL (ref 6.4–8.3)
RBC # BLD AUTO: 4.65 10E6/UL (ref 4.4–5.9)
SODIUM SERPL-SCNC: 139 MMOL/L (ref 136–145)
TRIGL SERPL-MCNC: 72 MG/DL
WBC # BLD AUTO: 7.4 10E3/UL (ref 4–11)

## 2023-01-13 PROCEDURE — 80053 COMPREHEN METABOLIC PANEL: CPT | Mod: ZL | Performed by: FAMILY MEDICINE

## 2023-01-13 PROCEDURE — G0439 PPPS, SUBSEQ VISIT: HCPCS | Performed by: FAMILY MEDICINE

## 2023-01-13 PROCEDURE — 99213 OFFICE O/P EST LOW 20 MIN: CPT | Mod: 25 | Performed by: FAMILY MEDICINE

## 2023-01-13 PROCEDURE — G0463 HOSPITAL OUTPT CLINIC VISIT: HCPCS

## 2023-01-13 PROCEDURE — 36415 COLL VENOUS BLD VENIPUNCTURE: CPT | Mod: ZL | Performed by: FAMILY MEDICINE

## 2023-01-13 PROCEDURE — 80061 LIPID PANEL: CPT | Mod: ZL | Performed by: FAMILY MEDICINE

## 2023-01-13 PROCEDURE — 85004 AUTOMATED DIFF WBC COUNT: CPT | Mod: ZL | Performed by: FAMILY MEDICINE

## 2023-01-13 RX ORDER — NITROGLYCERIN 0.4 MG/1
0.4 TABLET SUBLINGUAL EVERY 5 MIN PRN
Qty: 25 TABLET | Refills: 3 | Status: SHIPPED | OUTPATIENT
Start: 2023-01-13

## 2023-01-13 RX ORDER — METOPROLOL SUCCINATE 25 MG/1
TABLET, EXTENDED RELEASE ORAL
Qty: 45 TABLET | Refills: 4 | Status: SHIPPED | OUTPATIENT
Start: 2023-01-13 | End: 2023-04-17

## 2023-01-13 RX ORDER — ROSUVASTATIN CALCIUM 20 MG/1
20 TABLET, COATED ORAL AT BEDTIME
Qty: 90 TABLET | Refills: 4 | Status: SHIPPED | OUTPATIENT
Start: 2023-01-13

## 2023-01-13 RX ORDER — LOSARTAN POTASSIUM 50 MG/1
50 TABLET ORAL DAILY
Qty: 90 TABLET | Refills: 4 | Status: SHIPPED | OUTPATIENT
Start: 2023-01-13

## 2023-01-13 RX ORDER — LOSARTAN POTASSIUM 100 MG/1
100 TABLET ORAL DAILY
COMMUNITY
Start: 2022-11-03 | End: 2023-01-13

## 2023-01-13 ASSESSMENT — ANXIETY QUESTIONNAIRES
3. WORRYING TOO MUCH ABOUT DIFFERENT THINGS: NOT AT ALL
1. FEELING NERVOUS, ANXIOUS, OR ON EDGE: NOT AT ALL
7. FEELING AFRAID AS IF SOMETHING AWFUL MIGHT HAPPEN: NOT AT ALL
7. FEELING AFRAID AS IF SOMETHING AWFUL MIGHT HAPPEN: NOT AT ALL
5. BEING SO RESTLESS THAT IT IS HARD TO SIT STILL: NOT AT ALL
4. TROUBLE RELAXING: NOT AT ALL
GAD7 TOTAL SCORE: 0
GAD7 TOTAL SCORE: 0
2. NOT BEING ABLE TO STOP OR CONTROL WORRYING: NOT AT ALL
6. BECOMING EASILY ANNOYED OR IRRITABLE: NOT AT ALL
GAD7 TOTAL SCORE: 0

## 2023-01-13 ASSESSMENT — ENCOUNTER SYMPTOMS
NAUSEA: 0
WEAKNESS: 0
ARTHRALGIAS: 1
HEMATOCHEZIA: 0
CHILLS: 0
HEARTBURN: 1
FEVER: 0
COUGH: 0
PARESTHESIAS: 0
FREQUENCY: 1
SORE THROAT: 0
JOINT SWELLING: 1
HEADACHES: 0
SHORTNESS OF BREATH: 0
EYE PAIN: 0
DYSURIA: 0
ABDOMINAL PAIN: 0
HEMATURIA: 0
MYALGIAS: 1
DIARRHEA: 0
NERVOUS/ANXIOUS: 0
PALPITATIONS: 0
CONSTIPATION: 0

## 2023-01-13 ASSESSMENT — PATIENT HEALTH QUESTIONNAIRE - PHQ9
SUM OF ALL RESPONSES TO PHQ QUESTIONS 1-9: 0
SUM OF ALL RESPONSES TO PHQ QUESTIONS 1-9: 0

## 2023-01-13 ASSESSMENT — PAIN SCALES - GENERAL: PAINLEVEL: NO PAIN (0)

## 2023-01-13 ASSESSMENT — ACTIVITIES OF DAILY LIVING (ADL): CURRENT_FUNCTION: NO ASSISTANCE NEEDED

## 2023-01-13 NOTE — PATIENT INSTRUCTIONS
Start vitamin D3 50 mcg (2,000 units) daily, level was low past 2 checks    Patient Education   Personalized Prevention Plan  You are due for the preventive services outlined below.  Your care team is available to assist you in scheduling these services.  If you have already completed any of these items, please share that information with your care team to update in your medical record.  Health Maintenance Due   Topic Date Due    Flu Vaccine (1) 09/01/2022       Signs of Hearing Loss      Hearing much better with one ear can be a sign of hearing loss.   Hearing loss is a problem shared by many people. In fact, it is one of the most common health problems, particularly as people age. Most people age 65 and older have some hearing loss. By age 80, almost everyone does. Hearing loss often occurs slowly over the years. So you may not realize your hearing has gotten worse.  Have your hearing checked  Call your healthcare provider if you:  Have to strain to hear normal conversation  Have to watch other people s faces very carefully to follow what they re saying  Need to ask people to repeat what they ve said  Often misunderstand what people are saying  Turn the volume of the television or radio up so high that others complain  Feel that people are mumbling when they re talking to you  Find that the effort to hear leaves you feeling tired and irritated  Notice, when using the phone, that you hear better with one ear than the other  So1 last reviewed this educational content on 1/1/2020 2000-2021 [employer]. All rights reserved. This information is not intended as a substitute for professional medical care. Always follow your healthcare professional's instructions.

## 2023-01-13 NOTE — PROGRESS NOTES
"SUBJECTIVE:   rPaveen Lovell is a 77 year old male who presents for Preventive Visit.      Patient has been advised of split billing requirements and indicates understanding: Yes  Are you in the first 12 months of your Medicare Part B coverage?  No    Physical Health:    In general, how would you rate your overall physical health? good    Outside of work, how many days during the week do you exercise? 6-7 days/week    Outside of work, approximately how many minutes a day do you exercise?15-30 minutes    If you drink alcohol do you typically have >3 drinks per day or >7 drinks per week? No    Do you usually eat at least 4 servings of fruit and vegetables a day, include whole grains & fiber and avoid regularly eating high fat or \"junk\" foods? Yes    Do you have any problems taking medications regularly?  No    Do you have any side effects from medications? none    Needs assistance for the following daily activities: no assistance needed    Which of the following safety concerns are present in your home?  none identified     Hearing impairment: Yes,     In the past 6 months, have you been bothered by leaking of urine? no    Mental Health:    In general, how would you rate your overall mental or emotional health? good  PHQ-2 Score: 0    Do you feel safe in your environment? Yes    Have you ever done Advance Care Planning? (For example, a Health Directive, POLST, or a discussion with a medical provider or your loved ones about your wishes): Yes, advance care planning is on file.    Additional concerns to address?  YES    Fall risk:  Fallen 2 or more times in the past year?: No  Any fall with injury in the past year?: No      Cognitive Screenin) Repeat 3 items (Leader, Season, Table)    2) Clock draw: NORMAL  3) 3 item recall: Recalls 2 objects   Results: 3 items recalled: COGNITIVE IMPAIRMENT LESS LIKELY    Mini-CogTM Copyright LENNIE Murdock. Licensed by the author for use in [facility]; reprinted " with permission ([email or web address]). All rights reserved.      Do you have sleep apnea, excessive snoring or daytime drowsiness?: no     Review current opioid prescriptions    For a patient with a current opioid prescription: no prescription    Reviewed potential Opioid Use Disorder (OUD) risk factors: None    Screen for potential Substance Use Disorders (SUDs)    Reviewed the patient s potential risk factors for SUDs: None       Had mechanical TAVR at [facility] with Dr Calvin 2021 for severe aortic stenosis due to a bicuspid valve.  In 2022 found to have severe left carotid stenosis with enterectomy with Dr Joiner     Was having side effects on Crestor 40 mg, so dose was lowered.  Tolerating Crestor at 20 mg daily.     Active surveillance for prostate cancer with Dr Ray    Still not on Vitamin D    Reviewed and updated as needed this visit by clinical staff   Tobacco  Allergies  Meds      Soc Hx        Reviewed and updated as needed this visit by Provider   Tobacco  Allergies  Meds  Problems  Med Hx  Surg Hx  Fam Hx         Social History     Tobacco Use     Smoking status: Former     Packs/day: 1.00     Years: 30.00     Pack years: 30.00     Types: Cigarettes     Quit date: 1990     Years since quittin.0     Smokeless tobacco: Never     Tobacco comments:     Quit smoking: quit 7 years ago   Substance Use Topics     Alcohol use: Yes     Alcohol/week: 3.0 standard drinks     Comment: couple of times per week                           Current providers sharing in care for this patient include:   Patient Care Team:  Yan Zayas MD as PCP - General (Family Medicine)  Yan Zayas MD as Assigned PCP  Romel Ray MD as Assigned Surgical Provider    The following health maintenance items are reviewed in Epic and correct as of today:  Health Maintenance   Topic Date Due     INFLUENZA VACCINE (1) 2022     MEDICARE ANNUAL WELLNESS VISIT  2023     DTAP/TDAP/TD IMMUNIZATION (4  - Td or Tdap) 2023     FALL RISK ASSESSMENT  2024     ADVANCE CARE PLANNING  2025     LIPID  2027     HEPATITIS C SCREENING  Completed     PHQ-2 (once per calendar year)  Completed     Pneumococcal Vaccine: 65+ Years  Completed     ZOSTER IMMUNIZATION  Completed     COVID-19 Vaccine  Completed     IPV IMMUNIZATION  Aged Out     MENINGITIS IMMUNIZATION  Aged Out     COLORECTAL CANCER SCREENING  Discontinued     Patient Active Problem List   Diagnosis     Benign nodular prostatic hyperplasia with lower urinary tract symptoms     Coronary artery disease involving native coronary artery of native heart without angina pectoris     Mixed hyperlipidemia     ED (erectile dysfunction)     Prostate cancer, low risk, on active surveillance     Ascending aortic aneurysm at 4.1 cm on 18     Bicuspid aortic valve     Obesity     Benign essential hypertension     Hypertrophic cardiomyopathy (H)     Moderate/Severe aortic stenosis     Asymmetric septal hypertrophy (H)     History of cardiac catheterization on 14     H/O heart artery stent on 14 to his LAD     Past Surgical History:   Procedure Laterality Date     COLONOSCOPY N/A 2018    Tubular adenoma, no follow up due to age     CV TRANSCATHETER AORTIC VALVE REPLACEMENT  2021    Dr Calvin [facility]. [facility] AV     ENDARTERECTOMY CAROTID Left 02/15/2022    Dr Joiner [facility]     HERNIA REPAIR      ,HERNIA REPAIR     STENT,CORONARY, [id number] 2014    Cor angio: 90% prox LAD; s/p ZAYNAB x 1 to prox LAD with PTCA of diagonal 1 that was jailed due to plaque shifting, EF 65%       Social History     Tobacco Use     Smoking status: Former     Packs/day: 1.00     Years: 30.00     Pack years: 30.00     Types: Cigarettes     Quit date: 1990     Years since quittin.0     Smokeless tobacco: Never     Tobacco comments:     Quit smoking: quit 7 years ago   Substance Use Topics     Alcohol use: Yes     Alcohol/week:  "3.0 standard drinks     Comment: couple of times per week     Family History   Problem Relation Age of Onset     Arthritis Mother         Arthritis     Other - See Comments Mother 96        Stroke     Heart Disease Sister      Cancer Sister      Colon Cancer Paternal Aunt         Cancer-colon     Prostate Cancer Paternal Uncle 70        Cancer-prostate     Breast Cancer Sister          Current Outpatient Medications   Medication Sig Dispense Refill     amoxicillin (AMOXIL) 500 MG capsule take 4 Capsules by mouth 1 hour prior to dental appointment.       aspirin (ASA) 81 MG EC tablet Take 2 tablets (162 mg) by mouth daily       Black Pepper-Turmeric (TURMERIC CURCUMIN) 5-1000 MG CAPS Take 1 capsule by mouth daily       diphenhydrAMINE-acetaminophen (TYLENOL PM)  MG tablet Take 1 tablet by mouth nightly as needed for sleep       losartan (COZAAR) 50 MG tablet Take 1 tablet (50 mg) by mouth daily 90 tablet 4     metoprolol succinate ER (TOPROL XL) 25 MG 24 hr tablet TAKE 1/2 TABLET (12.5 MG) BY MOUTH DAILY 45 tablet 4     nitroGLYcerin (NITROSTAT) 0.4 MG sublingual tablet Place 1 tablet (0.4 mg) under the tongue every 5 minutes as needed for chest pain 1 tablet under the tongue every 5 minutes for 3 doses. 25 tablet 3     rosuvastatin (CRESTOR) 20 MG tablet Take 1 tablet (20 mg) by mouth At Bedtime 90 tablet 4     vitamin D3 (CHOLECALCIFEROL) 50 mcg (2000 units) tablet Take 1 tablet (50 mcg) by mouth daily       Allergies   Allergen Reactions     Diatrizoate Rash     Red Dye Other (See Comments)     Son states \"does not clear dye from system easily\"       Clopidogrel Rash       ROS:  As below    OBJECTIVE:   /70 (BP Location: Right arm, Patient Position: Sitting, Cuff Size: Adult Large)   Pulse 67   Temp 96.9  F (36.1  C) (Temporal)   Resp 17   Wt 96.2 kg (212 lb)   SpO2 96%   BMI 31.31 kg/m   Estimated body mass index is 31.31 kg/m  as calculated from the following:    Height as of 1/11/22: 1.753 m " "(5' 9\").    Weight as of this encounter: 96.2 kg (212 lb).  EXAM:   General Appearance: Pleasant, alert, appropriate appearance for age. No acute distress  Eye Exam:  Normal external eye, conjunctiva, lids, cornea. LINDA.  Ear Exam: Normal TM's bilaterally. Normal auditory canals and external ears.  OroPharynx Exam:  Dental hygiene adequate. Normal buccal mucosa. Normal pharynx.  Neck Exam:  Supple, no masses or nodes.  Thyroid Exam: No nodules or enlargement.  Chest/Respiratory Exam: Normal chest wall and respirations. Clear to auscultation.  Cardiovascular Exam: Regular rate and rhythm. S1, S2, no murmur, click, gallop, or rubs.  Gastrointestinal Exam: Soft, non-tender, no masses or organomegaly.  Musculoskeletal Exam: Back is straight and non-tender, full ROM of upper and lower extremities.  Foot Exam: Left and right foot: good pedal pulses.  Skin: no rash or abnormalities  Neurologic Exam: Nonfocal, symmetric DTRs, normal gross motor, tone coordination and no tremor.  Psychiatric Exam: Alert and oriented - appropriate affect.      Results for orders placed or performed in visit on 01/13/23   Lipid Panel     Status: Normal   Result Value Ref Range    Cholesterol 98 <200 mg/dL    Triglycerides 72 <150 mg/dL    Direct Measure HDL 45 >=40 mg/dL    LDL Cholesterol Calculated 39 <=100 mg/dL    Non HDL Cholesterol 53 <130 mg/dL    Narrative    Cholesterol  Desirable:  <200 mg/dL    Triglycerides  Normal:  Less than 150 mg/dL  Borderline High:  150-199 mg/dL  High:  200-499 mg/dL  Very High:  Greater than or equal to 500 mg/dL    Direct Measure HDL  Female:  Greater than or equal to 50 mg/dL   Male:  Greater than or equal to 40 mg/dL    LDL Cholesterol  Desirable:  <100mg/dL  Above Desirable:  100-129 mg/dL   Borderline High:  130-159 mg/dL   High:  160-189 mg/dL   Very High:  >= 190 mg/dL    Non HDL Cholesterol  Desirable:  130 mg/dL  Above Desirable:  130-159 mg/dL  Borderline High:  160-189 mg/dL  High:  190-219 " mg/dL  Very High:  Greater than or equal to 220 mg/dL   Comprehensive Metabolic Panel     Status: Abnormal   Result Value Ref Range    Sodium 139 136 - 145 mmol/L    Potassium 4.6 3.4 - 5.3 mmol/L    Chloride 105 98 - 107 mmol/L    Carbon Dioxide (CO2) 25 22 - 29 mmol/L    Anion Gap 9 7 - 15 mmol/L    Urea Nitrogen 18.7 8.0 - 23.0 mg/dL    Creatinine 1.03 0.67 - 1.17 mg/dL    Calcium 9.4 8.8 - 10.2 mg/dL    Glucose 100 (H) 70 - 99 mg/dL    Alkaline Phosphatase 102 40 - 129 U/L    AST 27 10 - 50 U/L    ALT 21 10 - 50 U/L    Protein Total 7.1 6.4 - 8.3 g/dL    Albumin 4.7 3.5 - 5.2 g/dL    Bilirubin Total 0.6 <=1.2 mg/dL    GFR Estimate 74 >60 mL/min/1.73m2   CBC with platelets and differential     Status: None   Result Value Ref Range    WBC Count 7.4 4.0 - 11.0 10e3/uL    RBC Count 4.65 4.40 - 5.90 10e6/uL    Hemoglobin 14.2 13.3 - 17.7 g/dL    Hematocrit 41.6 40.0 - 53.0 %    MCV 90 78 - 100 fL    MCH 30.5 26.5 - 33.0 pg    MCHC 34.1 31.5 - 36.5 g/dL    RDW 12.9 10.0 - 15.0 %    Platelet Count 151 150 - 450 10e3/uL    % Neutrophils 70 %    % Lymphocytes 17 %    % Monocytes 9 %    % Eosinophils 3 %    % Basophils 1 %    % Immature Granulocytes 0 %    NRBCs per 100 WBC 0 <1 /100    Absolute Neutrophils 5.2 1.6 - 8.3 10e3/uL    Absolute Lymphocytes 1.3 0.8 - 5.3 10e3/uL    Absolute Monocytes 0.7 0.0 - 1.3 10e3/uL    Absolute Eosinophils 0.2 0.0 - 0.7 10e3/uL    Absolute Basophils 0.1 0.0 - 0.2 10e3/uL    Absolute Immature Granulocytes 0.0 <=0.4 10e3/uL    Absolute NRBCs 0.0 10e3/uL   CBC and Differential     Status: None    Narrative    The following orders were created for panel order CBC and Differential.  Procedure                               Abnormality         Status                     ---------                               -----------         ------                     CBC with platelets and d...[[id number]]                      Final result                 Please view results for these tests on the  individual orders.        ASSESSMENT / PLAN:       ICD-10-CM    1. Encounter for Medicare annual wellness exam  Z00.00       2. S/P TAVR (transcatheter aortic valve replacement)  Z95.2       3. Aneurysm of ascending aorta without rupture  I71.21 metoprolol succinate ER (TOPROL XL) 25 MG 24 hr tablet     losartan (COZAAR) 50 MG tablet      4. Coronary artery disease involving native coronary artery of native heart without angina pectoris  I25.10 rosuvastatin (CRESTOR) 20 MG tablet     Comprehensive Metabolic Panel     Lipid Panel     CBC and Differential     nitroGLYcerin (NITROSTAT) 0.4 MG sublingual tablet     CBC and Differential     Lipid Panel     Comprehensive Metabolic Panel      5. History of cardiac catheterization on 12/22/14  Z98.890 nitroGLYcerin (NITROSTAT) 0.4 MG sublingual tablet      6. H/O heart artery stent on 12/22/14 to his LAD  Z95.5 nitroGLYcerin (NITROSTAT) 0.4 MG sublingual tablet      7. Asymmetric septal hypertrophy (H)  I42.2       8. Carotid stenosis, left  I65.22       9. Benign essential hypertension  I10 Comprehensive Metabolic Panel     metoprolol succinate ER (TOPROL XL) 25 MG 24 hr tablet     losartan (COZAAR) 50 MG tablet     Comprehensive Metabolic Panel      10. Mixed hyperlipidemia  E78.2 rosuvastatin (CRESTOR) 20 MG tablet     Comprehensive Metabolic Panel     Lipid Panel     Lipid Panel     Comprehensive Metabolic Panel      11. Vitamin D insufficiency  E55.9       12. Prostate cancer (H)  C61             Patient has been advised of split billing requirements and indicates understanding: Yes    He is going to follow-up with [facility] cardiology in March for follow-up on TAVR.  Echocardiogram planned at cardiology follow-up, which will assess ascending aortic aneurysm, which just requires monitoring at this time.  CAD stable.  History of stenting.  Septal hypertrophy noted in the past, which will be evaluated with echocardiogram through [facility].    Left carotid stenosis,  "status post endarterectomy in February 2022.  Had follow-up with vascular surgery last year and can follow-up as needed.    Blood pressure controlled.  LDL at goal.  Refilled medications    Vitamin D has been low when checked twice in the past, last at 24.  He has not been taking vitamin D replacement.  Recommend starting vitamin D3 2000 units daily.    Prostate cancer with active surveillance, seeing urology      COUNSELING:  Reviewed preventive health counseling, as reflected in patient instructions       Regular exercise       Healthy diet/nutrition       Vision screening       Hearing screening       Immunizations    Current    Flu shot received in Oct, but not documented      Estimated body mass index is 31.31 kg/m  as calculated from the following:    Height as of 1/11/22: 1.753 m (5' 9\").    Weight as of this encounter: 96.2 kg (212 lb).    Weight management plan: Discussed healthy diet and exercise guidelines    He reports that he quit smoking about 33 years ago. His smoking use included cigarettes. He has a 30.00 pack-year smoking history. He has never used smokeless tobacco.    Appropriate preventive services were discussed with this patient, including applicable screening as appropriate for cardiovascular disease, diabetes, osteopenia/osteoporosis, and glaucoma.  As appropriate for age/gender, discussed screening for colorectal cancer, prostate cancer, breast cancer, and cervical cancer. Checklist reviewing preventive services available has been given to the patient.    Reviewed patients plan of care and provided an AVS. The Basic Care Plan (routine screening as documented in Health Maintenance) for Praveen meets the Care Plan requirement. This Care Plan has been established and reviewed with the Patient.    Counseling Resources:  ATP IV Guidelines  Pooled Cohorts Equation Calculator  Breast Cancer Risk Calculator  BRCA-Related Cancer Risk Assessment: FHS-7 Tool  FRAX Risk Assessment  ICSI Preventive " "Guidelines  Dietary Guidelines for Americans, 2010  USDA's MyPlate  ASA Prophylaxis  Lung CA Screening    Yan Zayas MD  [facility]        Healthy Habits:     In general, how would you rate your overall health?  Good    Frequency of exercise:  2-3 days/week    Duration of exercise:  45-60 minutes    Do you usually eat at least 4 servings of fruit and vegetables a day, include whole grains    & fiber and avoid regularly eating high fat or \"junk\" foods?  Yes    Taking medications regularly:  Yes    Medication side effects:  None    Ability to successfully perform activities of daily living:  No assistance needed    Home Safety:  Lack of grab bars in the bathroom    Hearing Impairment:  Difficulty following a conversation in a noisy restaurant or crowded room, feel that people are mumbling or not speaking clearly, difficult to understand a speaker at a public meeting or Shinto service, need to ask people to speak up or repeat themselves, find that men's voices are easier to understand than woman's and difficulty understanding soft or whispered speech    In the past 6 months, have you been bothered by leaking of urine?  No    In general, how would you rate your overall mental or emotional health?  Good      PHQ-2 Total Score: 0    Additional concerns today:  Yes       Review of Systems   Constitutional: Negative for chills and fever.   HENT: Negative for congestion, ear pain, hearing loss and sore throat.    Eyes: Negative for pain and visual disturbance.   Respiratory: Negative for cough and shortness of breath.    Cardiovascular: Negative for chest pain, palpitations and peripheral edema.   Gastrointestinal: Positive for heartburn. Negative for abdominal pain, constipation, diarrhea, hematochezia and nausea.   Genitourinary: Positive for frequency and urgency. Negative for dysuria, genital sores, hematuria, impotence and penile discharge.   Musculoskeletal: Positive for arthralgias, " joint swelling and myalgias.   Skin: Positive for rash.   Neurological: Negative for weakness, headaches and paresthesias.   Psychiatric/Behavioral: Negative for mood changes. The patient is not nervous/anxious.       Answers for HPI/ROS submitted by the patient on 1/13/2023  PHQ9 TOTAL SCORE: 0  MIKEY 7 TOTAL SCORE: 0        The patient was provided with written information regarding signs of hearing loss.

## 2023-01-14 PROBLEM — Z95.2 S/P TAVR (TRANSCATHETER AORTIC VALVE REPLACEMENT): Status: ACTIVE | Noted: 2023-01-14

## 2023-01-24 DIAGNOSIS — C61 PROSTATE CANCER (H): Primary | ICD-10-CM

## 2023-01-24 NOTE — PROGRESS NOTES
"Per last office visit  8/25/22 with Romel Ray MD \"Plan  Continue Flomax 0.4mg every evening  Continue active surveillance for prostate cancer with PSA q6 months\"        Orders Placed    "

## 2023-02-28 ENCOUNTER — TRANSFERRED RECORDS (OUTPATIENT)
Dept: HEALTH INFORMATION MANAGEMENT | Facility: OTHER | Age: 78
End: 2023-02-28
Payer: COMMERCIAL

## 2023-03-01 ENCOUNTER — OFFICE VISIT (OUTPATIENT)
Dept: UROLOGY | Facility: OTHER | Age: 78
End: 2023-03-01
Attending: UROLOGY
Payer: COMMERCIAL

## 2023-03-01 ENCOUNTER — LAB (OUTPATIENT)
Dept: LAB | Facility: OTHER | Age: 78
End: 2023-03-01
Attending: UROLOGY
Payer: COMMERCIAL

## 2023-03-01 VITALS
RESPIRATION RATE: 16 BRPM | BODY MASS INDEX: 32.78 KG/M2 | WEIGHT: 222 LBS | OXYGEN SATURATION: 95 % | DIASTOLIC BLOOD PRESSURE: 60 MMHG | SYSTOLIC BLOOD PRESSURE: 124 MMHG | HEART RATE: 92 BPM

## 2023-03-01 DIAGNOSIS — N40.1 BENIGN PROSTATIC HYPERPLASIA WITH URINARY FREQUENCY: ICD-10-CM

## 2023-03-01 DIAGNOSIS — R35.0 BENIGN PROSTATIC HYPERPLASIA WITH URINARY FREQUENCY: ICD-10-CM

## 2023-03-01 DIAGNOSIS — C61 PROSTATE CANCER (H): Primary | ICD-10-CM

## 2023-03-01 DIAGNOSIS — C61 PROSTATE CANCER (H): ICD-10-CM

## 2023-03-01 LAB — PSA SERPL-MCNC: 8.1 NG/ML (ref 0–6.5)

## 2023-03-01 PROCEDURE — 36415 COLL VENOUS BLD VENIPUNCTURE: CPT | Mod: ZL

## 2023-03-01 PROCEDURE — G0463 HOSPITAL OUTPT CLINIC VISIT: HCPCS

## 2023-03-01 PROCEDURE — 99213 OFFICE O/P EST LOW 20 MIN: CPT | Performed by: UROLOGY

## 2023-03-01 PROCEDURE — 84153 ASSAY OF PSA TOTAL: CPT | Mod: ZL

## 2023-03-01 ASSESSMENT — PAIN SCALES - GENERAL: PAINLEVEL: NO PAIN (0)

## 2023-03-01 NOTE — PROGRESS NOTES
Type of Visit  EST    Chief Complaint  Prostate cancer on active surveillance  BPH with nocturia    HPI  Mr. Lovell is a 77 y.o. male with clinical low risk prostate cancer (since 2013) on active surveillance.   The patient has been following up with PSA checks every 6 months.  Patient underwent a PSA prior to this visit.  He reports stable urinary symptoms.  He does use Flomax for BPH.  He denies unintentional weight loss, bone or pelvic pain.  He underwent a PSA earlier and follows up to review the results.      Review of Systems  I reviewed the ROS the patient today.    Nursing Notes:   Medina Bojorquez LPN  3/1/2023 10:59 AM  Signed  Chief Complaint   Patient presents with     Follow Up     6 month history prostate cancer   Patient presents to the clinic today for a 6 month history of prostate cancer    Review of Systems:    Weight loss:    No     Recent fever/chills:  No   Night sweats:   No  Current skin rash:  No   Recent hair loss:  No  Heat intolerance:  No   Cold intolerance:  No  Chest pain:   No   Palpitations:   No  Shortness of breath:  No   Wheezing:   No  Constipation:    No   Diarrhea:   No   Nausea:   No   Vomiting:   No   Kidney/side pain:  No   Back pain:   No  Frequent headaches:  No   Dizziness:     No  Leg swelling:   No   Calf pain:    No        Medication Reconciliation: completed   Medina Bojorquez LPN  3/1/2023 10:59 AM     Physical Exam  /60 (BP Location: Right arm, Patient Position: Sitting, Cuff Size: Adult Large)   Pulse 92   Resp 16   Wt 100.7 kg (222 lb)   SpO2 95%   BMI 32.78 kg/m    Constitutional: NAD, WDWN.  Pulmonary/Chest: Respirations are even and non-labored bilaterally.  Abdominal: Soft. No distension, tenderness, masses or guarding. No CVA tenderness.  Extremities: GLORIA x 4, Warm. No clubbing.  No cyanosis.    Skin: Pink, warm and dry.  No rashes noted.  Genitourinary:  Nonpalpable bladder  Prostate:  40 grams.  Symmetric, non-tender, and no induration.      Labs    08/25/22 09:39 03/01/23 09:42   PSA 4.82 (H) 8.10 (H)      02/23/22 09:35   PSA 7.09 (H)     Results for LIBORIO LOVELL (MRN [id number]) as of 8/25/2021 14:16   8/5/2021 09:50   PSA 4.36 (H)     Results for LIBORIO LOVELL (MRN [id number]) as of 2/4/2021 13:48   2/4/2021 10:58   PSA 4.551 (H)     Results for LIBORIO LOVELL (MRN [id number]) as of 8/3/2020 13:36   8/3/2020 09:53   PSA 4.154 (H)     Results for LIBORIO LOVELL (MRN [id number]) as of 2/3/2020 13:22   2/3/2020 09:52   PSA 4.744 (H)     Results for LIBORIO LOVELL (MRN [id number]) as of 7/29/2019 12:00   2/18/2019 09:51 7/29/2019 08:24   PSA 4.337 (H) 3.869 (H)     Results for LIBORIO LOVELL (MRN [id number]) as of 8/24/2018 11:26   7/25/2018 11:17   PSA 4.908 (H)     Results for LIBORIO LOVELL (MRN [id number]) as of 1/15/2018 10:05   9/24/2014 11:10 3/25/2015 11:00   PSA 2.450 2.680     Pathology  12/16/2015  Grade 3+3=6 prostate cancer in 2/12 cores (80%)    10/9/2013  Grade 3+3=6 prostate cancer in 2/12 cores (15-20%)    Oncotype Dx    (from the 10/2013 specimen)  GPS:     24  Likelihood of favorable pathology: 61%    PSA:       3.389  Calculated prostate volume based on TRUS: 56 grams  PSA Density:      0.06 ng/mL/g  T stage:      cT2a    Assessment  Mr. Lovell is a 77 y.o. male with initial low risk prostate cancer (cT2a) on active surveillance.  PSA today did reflect an increase from the prior value.  The absolute value overall for his age and situation however is not a significant concern.    Plan  Continue Flomax 0.4mg every evening  Continue active surveillance for prostate cancer with PSA q6 months

## 2023-03-01 NOTE — NURSING NOTE
Chief Complaint   Patient presents with     Follow Up     6 month history prostate cancer   Patient presents to the clinic today for a 6 month history of prostate cancer    Review of Systems:    Weight loss:    No     Recent fever/chills:  No   Night sweats:   No  Current skin rash:  No   Recent hair loss:  No  Heat intolerance:  No   Cold intolerance:  No  Chest pain:   No   Palpitations:   No  Shortness of breath:  No   Wheezing:   No  Constipation:    No   Diarrhea:   No   Nausea:   No   Vomiting:   No   Kidney/side pain:  No   Back pain:   No  Frequent headaches:  No   Dizziness:     No  Leg swelling:   No   Calf pain:    No        Medication Reconciliation: completed   Medina Bojorquez LPN  3/1/2023 10:59 AM

## 2023-03-13 ENCOUNTER — TRANSFERRED RECORDS (OUTPATIENT)
Dept: HEALTH INFORMATION MANAGEMENT | Facility: OTHER | Age: 78
End: 2023-03-13
Payer: COMMERCIAL

## 2023-04-17 ENCOUNTER — OFFICE VISIT (OUTPATIENT)
Dept: FAMILY MEDICINE | Facility: OTHER | Age: 78
End: 2023-04-17
Attending: FAMILY MEDICINE
Payer: COMMERCIAL

## 2023-04-17 VITALS
SYSTOLIC BLOOD PRESSURE: 122 MMHG | RESPIRATION RATE: 18 BRPM | DIASTOLIC BLOOD PRESSURE: 78 MMHG | BODY MASS INDEX: 32.17 KG/M2 | TEMPERATURE: 97.8 F | HEIGHT: 69 IN | HEART RATE: 70 BPM | WEIGHT: 217.2 LBS | OXYGEN SATURATION: 96 %

## 2023-04-17 DIAGNOSIS — R59.0 SUPRACLAVICULAR ADENOPATHY: Primary | ICD-10-CM

## 2023-04-17 DIAGNOSIS — C61 PROSTATE CANCER (H): ICD-10-CM

## 2023-04-17 DIAGNOSIS — L98.9 SKIN LESION ON EXAMINATION: ICD-10-CM

## 2023-04-17 PROCEDURE — 99214 OFFICE O/P EST MOD 30 MIN: CPT | Performed by: FAMILY MEDICINE

## 2023-04-17 PROCEDURE — G0463 HOSPITAL OUTPT CLINIC VISIT: HCPCS

## 2023-04-17 RX ORDER — LOSARTAN POTASSIUM 100 MG/1
50 TABLET ORAL DAILY
COMMUNITY
Start: 2023-02-01 | End: 2023-04-17

## 2023-04-17 ASSESSMENT — PAIN SCALES - GENERAL: PAINLEVEL: NO PAIN (0)

## 2023-04-17 NOTE — PROGRESS NOTES
Assessment & Plan       ICD-10-CM    1. Supraclavicular adenopathy  R59.0 CT Chest w/o Contrast      2. Prostate cancer, low risk, on active surveillance  C61 Prostate Specific Antigen      3. Skin lesion on examination  L98.9 Adult Dermatology Referral        Small potential lymph node in the supraclavicular region left.  He does not have night sweats or weight loss. History of TAVR in March 2021 at [facility] and carotid endarterectomy in February 2022.  Had some imaging previously at [facility] for these issues.  No abnormalities were noted.  However, lymph node was not present at that time.  Due to potential for malignancy, recommend CT chest for further details.  Most likely benign process is present, so if scan is negative, no follow-up needed.    Recently saw Dr. Ray 3/1/2023 for active surveillance of low risk prostate cancer (cT2a).  Active surveillance with PSA every 6 months recommended.  Ordered a PSA to obtain in 6 months.  He should then establish with a different urologist after that result is known.    Desires to see dermatology for skin check.  Referral placed.    32 minutes spent by me on the date of the encounter doing chart review, patient visit and documentation      Yan Zayas MD   [facility] [place]   Praveen is a 77 year old, presenting for the following health issues:  Mass        4/17/2023    10:57 AM   Additional Questions   Roomed by DAT Curtis   Accompanied by -         4/17/2023    10:57 AM   Patient Reported Additional Medications   Patient reports taking the following new medications -     Mass    History of Present Illness       Reason for visit:  Swollen gland    He eats 2-3 servings of fruits and vegetables daily.He consumes 0 sweetened beverage(s) daily.He exercises with enough effort to increase his heart rate 60 or more minutes per day.  He exercises with enough effort to increase his heart rate 6 days per week.   He is taking medications  "regularly.       Lump noted above left clavicle for a few months  No night sweats or weight loss    Review of Systems   As above      Objective    /78 (BP Location: Right arm, Patient Position: Sitting, Cuff Size: Adult Large)   Pulse 70   Temp 97.8  F (36.6  C) (Tympanic)   Resp 18   Ht 1.745 m (5' 8.7\")   Wt 98.5 kg (217 lb 3.2 oz)   SpO2 96%   BMI 32.35 kg/m    Body mass index is 32.35 kg/m .  Physical Exam   General Appearance: Alert. No acute distress  Psychiatric: Normal affect and mentation  Lymph: No anterior or posterior cervical adenopathy.  Small supraclavicular node on the left near insertion of neck muscles.  No abnormalities on the right.  No axillary adenopathy.  No inguinal adenopathy.                    "

## 2023-04-17 NOTE — NURSING NOTE
"Chief Complaint   Patient presents with     Mass         Initial /78 (BP Location: Right arm, Patient Position: Sitting, Cuff Size: Adult Large)   Pulse 70   Temp 97.8  F (36.6  C) (Tympanic)   Resp 18   Ht 1.745 m (5' 8.7\")   Wt 98.5 kg (217 lb 3.2 oz)   SpO2 96%   BMI 32.35 kg/m   Estimated body mass index is 32.35 kg/m  as calculated from the following:    Height as of this encounter: 1.745 m (5' 8.7\").    Weight as of this encounter: 98.5 kg (217 lb 3.2 oz).         Kirsten Marsh LPN         Advance Care Directive on file? no  Advance Care Directive provided to patient? Declined       FOOD SECURITY SCREENING QUESTIONS:    The next two questions are to help us understand your food security.  If you are feeling you need any assistance in this area, we have resources available to support you today.    Hunger Vital Signs:  Within the past 12 months we worried whether our food would run out before we got money to buy more. Never  Within the past 12 months the food we bought just didn't last and we didn't have money to get more. Never  Kirsten Marsh LPN,LPN on 4/17/2023 at 11:06 AM      Food Insecurity: Not on file     "

## 2023-04-17 NOTE — PATIENT INSTRUCTIONS
CT scan to check thorax due to the small nodule in your left collarbone area  Schedule a lab only appointment in September 2023 for the PSA.     [facility] [address] [address]  Call [phone] or [phone] to schedule an appointment    Mastoid Interpolation Flap Text: A decision was made to reconstruct the defect utilizing an interpolation axial flap and a staged reconstruction.  A telfa template was made of the defect.  This telfa template was then used to outline the mastoid interpolation flap.  The donor area for the pedicle flap was then injected with anesthesia.  The flap was excised through the skin and subcutaneous tissue down to the layer of the underlying musculature.  The pedicle flap was carefully excised within this deep plane to maintain its blood supply.  The edges of the donor site were undermined.   The donor site was closed in a primary fashion.  The pedicle was then rotated into position and sutured.  Once the tube was sutured into place, adequate blood supply was confirmed with blanching and refill.  The pedicle was then wrapped with xeroform gauze and dressed appropriately with a telfa and gauze bandage to ensure continued blood supply and protect the attached pedicle.

## 2023-05-03 ENCOUNTER — TRANSFERRED RECORDS (OUTPATIENT)
Dept: HEALTH INFORMATION MANAGEMENT | Facility: OTHER | Age: 78
End: 2023-05-03
Payer: COMMERCIAL

## 2023-05-04 ENCOUNTER — TELEPHONE (OUTPATIENT)
Dept: FAMILY MEDICINE | Facility: OTHER | Age: 78
End: 2023-05-04

## 2023-05-04 NOTE — TELEPHONE ENCOUNTER
Received denial from [facility] (Jason). Denial paperwork sent to scanning.    Appeal   Phone # [phone]  Fax # [phone]

## 2023-05-04 NOTE — TELEPHONE ENCOUNTER
Routing back to sender.  Do you remember which medication the denial was for.  Patient has new medications to reconcile from [facility] in [place].  Not sure if they were prescribed by Yan Zayas MD since he was last seen by primary on 04/17/23.    Macey Washington LPN............5/4/2023 9:12 AM

## 2023-05-16 ENCOUNTER — OFFICE VISIT (OUTPATIENT)
Dept: FAMILY MEDICINE | Facility: OTHER | Age: 78
End: 2023-05-16
Attending: NURSE PRACTITIONER
Payer: COMMERCIAL

## 2023-05-16 VITALS
OXYGEN SATURATION: 98 % | SYSTOLIC BLOOD PRESSURE: 144 MMHG | DIASTOLIC BLOOD PRESSURE: 104 MMHG | WEIGHT: 212 LBS | HEART RATE: 72 BPM | BODY MASS INDEX: 31.4 KG/M2 | HEIGHT: 69 IN | TEMPERATURE: 96.8 F | RESPIRATION RATE: 12 BRPM

## 2023-05-16 DIAGNOSIS — S61.216A LACERATION OF RIGHT LITTLE FINGER WITHOUT FOREIGN BODY WITHOUT DAMAGE TO NAIL, INITIAL ENCOUNTER: Primary | ICD-10-CM

## 2023-05-16 PROCEDURE — 12004 RPR S/N/AX/GEN/TRK7.6-12.5CM: CPT

## 2023-05-16 PROCEDURE — 12004 RPR S/N/AX/GEN/TRK7.6-12.5CM: CPT | Performed by: NURSE PRACTITIONER

## 2023-05-16 PROCEDURE — 90471 IMMUNIZATION ADMIN: CPT

## 2023-05-16 PROCEDURE — 250N000009 HC RX 250: Performed by: NURSE PRACTITIONER

## 2023-05-16 PROCEDURE — G0463 HOSPITAL OUTPT CLINIC VISIT: HCPCS

## 2023-05-16 RX ORDER — LIDOCAINE HYDROCHLORIDE 10 MG/ML
2 INJECTION, SOLUTION EPIDURAL; INFILTRATION; INTRACAUDAL; PERINEURAL ONCE
Status: COMPLETED | OUTPATIENT
Start: 2023-05-16 | End: 2023-05-16

## 2023-05-16 RX ORDER — CEPHALEXIN 500 MG/1
500 CAPSULE ORAL 2 TIMES DAILY
Qty: 20 CAPSULE | Refills: 0 | Status: SHIPPED | OUTPATIENT
Start: 2023-05-16 | End: 2023-05-26

## 2023-05-16 RX ORDER — HYDROCORTISONE 2.5 %
CREAM (GRAM) TOPICAL
COMMUNITY
Start: 2023-05-03

## 2023-05-16 RX ORDER — KETOCONAZOLE 20 MG/G
CREAM TOPICAL
COMMUNITY
Start: 2023-05-03

## 2023-05-16 RX ADMIN — LIDOCAINE HYDROCHLORIDE 2 ML: 10 INJECTION, SOLUTION EPIDURAL; INFILTRATION; INTRACAUDAL; PERINEURAL at 16:48

## 2023-05-16 ASSESSMENT — PAIN SCALES - GENERAL: PAINLEVEL: MODERATE PAIN (4)

## 2023-05-16 NOTE — NURSING NOTE
"Pt presents to  for cut on R pinky. Pt states \"I think I saw a tendon or something.\"    Chief Complaint   Patient presents with     Laceration     On R pinky       FOOD SECURITY SCREENING QUESTIONS  Hunger Vital Signs:  Within the past 12 months we worried whether our food would run out before we got money to buy more. Never  Within the past 12 months the food we bought just didn't last and we didn't have money to get more. Never  Macey Hoang 5/16/2023 3:34 PM      Initial BP (!) 144/104 (BP Location: Left arm, Patient Position: Sitting, Cuff Size: Adult Large)   Pulse 72   Temp 96.8  F (36  C) (Tympanic)   Resp 12   Ht 1.753 m (5' 9\")   Wt 96.2 kg (212 lb)   SpO2 98%   BMI 31.31 kg/m   Estimated body mass index is 31.31 kg/m  as calculated from the following:    Height as of this encounter: 1.753 m (5' 9\").    Weight as of this encounter: 96.2 kg (212 lb).  Medication Reconciliation: complete    Macey Viktor  "

## 2023-05-16 NOTE — PROGRESS NOTES
ASSESSMENT/PLAN:     I have reviewed the nursing notes.  I have reviewed the findings, diagnosis, plan and need for follow up with the patient.        1. Laceration of right little finger without foreign body without damage to nail, initial encounter    - lidocaine (PF) (XYLOCAINE) 1 % injection 2 mL  - Repair, face, genital, hand, ft+5cm/<  - TDAP VACCINE (Adacel, Boostrix)  [[id number]]  - cephALEXin (KEFLEX) 500 MG capsule; Take 1 capsule (500 mg) by mouth 2 times daily for 10 days  Dispense: 20 capsule; Refill: 0    Complex multiple lacerations to right little finger requiring 14 sutures.    Keep bandage in place for 2 days - until Thurday May 18 then remove and wash gently with soapy water once daily.  Do not soak in water.  Apply non adherent bandage followed by guaze bandage - change daily    Monitor for signs of infection such as pus, purulence, increased redness, increased swelling or pain  Follow up immediately if concerns of infection    Tetanus updated today    Start antibiotics as prescribed    May use over-the-counter Tylenol or ibuprofen PRN    Return in 14 days for suture removal           I explained my diagnostic considerations and recommendations to the patient, who voiced understanding and agreement with the treatment plan. All questions were answered. We discussed potential side effects of any prescribed or recommended therapies, as well as expectations for response to treatments.    Katie Coronado NP  [facility]      SUBJECTIVE:   Praveen Lovell is a 77 year old male who presents to clinic today for the following health issues:  Finger laceration    HPI  Patient sustained a right little finger laceration today about 3 hours ago when he tripped and caught his hand/fingers on a piece of metal, thinking a piece of bike pedal.  Some tingling.  Lots of bleeding.  No noted weakness.  Right hand dominant.  Takes low dose Aspirin, no prescription anticoagulants.  Last  tetanus 13      Past Medical History:   Diagnosis Date     Atherosclerotic heart disease of native coronary artery without angina pectoris     2014,- 14 Cor angio: 90% prox LAD; s/p ZAYNAB x 1 to prox LAD with PTCA of diagonal 1 that was jailed due to plaque shifting, EF 65%     Essential (primary) hypertension     No Comments Provided     Lyme disease     2015     Malignant neoplasm of prostate (H)     No Comments Provided     Pain in knee     No Comments Provided     PVC's (premature ventricular contractions) 2/15/2017     Stented coronary artery 2018     Unilateral inguinal hernia without obstruction or gangrene     2012     Past Surgical History:   Procedure Laterality Date     COLONOSCOPY N/A 2018    Tubular adenoma, no follow up due to age     CV TRANSCATHETER AORTIC VALVE REPLACEMENT  2021    Dr Calvin [facility]. [address] [facility]     ENDARTERECTOMY CAROTID Left 02/15/2022    Dr Joiner [facility]     HERNIA REPAIR      ,HERNIA REPAIR     STENT,CORONARY, S660 2014    Cor angio: 90% prox LAD; s/p ZAYNAB x 1 to prox LAD with PTCA of diagonal 1 that was jailed due to plaque shifting, EF 65%     Social History     Tobacco Use     Smoking status: Former     Packs/day: 1.00     Years: 30.00     Pack years: 30.00     Types: Cigarettes     Quit date: 1990     Years since quittin.3     Smokeless tobacco: Never     Tobacco comments:     Quit smoking: quit 7 years ago   Vaping Use     Vaping status: Never Used   Substance Use Topics     Alcohol use: Yes     Alcohol/week: 3.0 standard drinks of alcohol     Comment: couple of times per week     Current Outpatient Medications   Medication Sig Dispense Refill     amoxicillin (AMOXIL) 500 MG capsule take 4 Capsules by mouth 1 hour prior to dental appointment.       aspirin (ASA) 81 MG EC tablet Take 2 tablets (162 mg) by mouth daily       Black Pepper-Turmeric (TURMERIC CURCUMIN) 5-1000 MG CAPS Take 1 capsule by  "mouth daily       diphenhydrAMINE-acetaminophen (TYLENOL PM)  MG tablet Take 1 tablet by mouth nightly as needed for sleep       hydrocortisone 2.5 % cream Apply twice daily to rash on chest, behind ears, and groin until clear, then stop. Mix pea size amount with pea size amount of Ketoconazole 2%.       ketoconazole (NIZORAL) 2 % external cream Apply twice daily to rash on chest, behind ears, and in groin until clear, then stop. Mix pea size amount with pea size amount of hydrocortisone 2.5%.       losartan (COZAAR) 50 MG tablet Take 1 tablet (50 mg) by mouth daily 90 tablet 4     nitroGLYcerin (NITROSTAT) 0.4 MG sublingual tablet Place 1 tablet (0.4 mg) under the tongue every 5 minutes as needed for chest pain 1 tablet under the tongue every 5 minutes for 3 doses. 25 tablet 3     rosuvastatin (CRESTOR) 20 MG tablet Take 1 tablet (20 mg) by mouth At Bedtime 90 tablet 4     vitamin D3 (CHOLECALCIFEROL) 50 mcg (2000 units) tablet Take 1 tablet (50 mcg) by mouth daily       Allergies   Allergen Reactions     Diatrizoate Rash     Red Dye Other (See Comments)     Son states \"does not clear dye from system easily\"       Clopidogrel Rash         Past medical history, past surgical history, current medications and allergies reviewed and accurate to the best of my knowledge.        OBJECTIVE:     BP (!) 144/104 (BP Location: Left arm, Patient Position: Sitting, Cuff Size: Adult Large)   Pulse 72   Temp 96.8  F (36  C) (Tympanic)   Resp 12   Ht 1.753 m (5' 9\")   Wt 96.2 kg (212 lb)   SpO2 98%   BMI 31.31 kg/m    Body mass index is 31.31 kg/m .     Physical Exam  General Appearance: Well appearing , appropriate appearance for age. No acute distress  Cardiac: CMS intact to right 5th finger pre and post suture repair with capillary refill remaining brisk and less than 2 seconds  Musculoskeletal:  Equal movement of bilateral upper extremities with exception of right 5th finger.  Chronic joint deformity of the PIP " joint of the right 5th finger without associated tenderness but decreased ROM.  Strength intact to right 5th finger against resistance.  Equal movement of bilateral lower extremities.  Normal gait.    Dermatological:  Complex multiple lacerations to right 5th finger on palmar side with wound gapping and deep subcutaneous tissue exposed, no visible bone or tendon, mild bleeding, no active squirting of blood, no surrounding erythema or bruising.  Total combined length of multiple lacerations measuring 8 to 10 cm.   See attached photo.   Psychological: normal affect, alert, oriented, and pleasant.               Procedural note:   Options are discussed and patient decided to proceed with the suture placement.  Risks and benefits discussed.  Verbal consent obtained.    PROCEDURE:  Laceration repair  LACERATION: complex multiple lacerations through subcutaneous tissue, no visible bone or tendon involvement noted  LOCATION:  right fifth finger  ANESTHESIA:  Digital block using Lidocaine 1% without Epinephrine, total of 2 ml   PREPARATION:  Cleansed with Hibiclens   DEBRIDEMENT:  No debridement   CLOSURE:  Wound closed in one layer.  Skin closed with 14 sutures using 4.0 Ethilon  SUTURES/STAPLES:  Return in 14 days for suture removal

## 2023-05-30 ENCOUNTER — ALLIED HEALTH/NURSE VISIT (OUTPATIENT)
Dept: FAMILY MEDICINE | Facility: OTHER | Age: 78
End: 2023-05-30
Payer: COMMERCIAL

## 2023-05-30 DIAGNOSIS — Z48.02 ENCOUNTER FOR REMOVAL OF SUTURES: Primary | ICD-10-CM

## 2023-05-30 NOTE — PROGRESS NOTES
1. Encounter for removal of sutures    S/P right 5th finger laceration with sutures placed on 5/16/23.  Patient returned today as instructed for suture removal.  Patient denies any concerns for infection and is impressed with the healing.      Sutures removed today by RN.      Discussed warning signs/symptoms indicative of need to f/u  Follow up if symptoms persist or worsen or concerns

## 2023-05-30 NOTE — PROGRESS NOTES
Verified patient's first and last name, and . Patient presents to clinic today for suture removal. Sutures placed on 23 at [facility] by Katie Coronado NP. Provider present at beginning of visit to assess site. No concerns noted by EDIN Wilson. (14) stiches remain intact in laceration prior to suture removal. Laceration suture area gently cleansed with alcohol wipe and air dried. (14) sutures removed. (4) Steri-Strips applied over area. Educated patient to allow Steri-Strips to fall off on their own. Patient stated understanding and left clinic ambulatory with no questions or concerns.     ALEJANDRO PHILLIPS RN on 2023 at 4:57 PM

## 2025-03-23 ENCOUNTER — HEALTH MAINTENANCE LETTER (OUTPATIENT)
Age: 80
End: 2025-03-23

## (undated) DEVICE — SOL WATER 1500ML

## (undated) DEVICE — ENDO TRAP POLYP E-TRAP 00711099

## (undated) DEVICE — ENDO SNARE EXACTO COLD 9MM LOOP 2.4MMX230CM 00711115

## (undated) DEVICE — ENDO KIT COMPLIANCE DYKENDOCMPLY

## (undated) DEVICE — TUBING SUCTION 10'X3/16" N510

## (undated) DEVICE — ENDO BRUSH CHANNEL MASTER CLEANING 2-4.2MM BW-412T

## (undated) DEVICE — SUCTION MANIFOLD NEPTUNE 2 SYS 4 PORT 0702-020-000

## (undated) RX ORDER — PROPOFOL 10 MG/ML
INJECTION, EMULSION INTRAVENOUS
Status: DISPENSED
Start: 2018-09-21

## (undated) RX ORDER — LIDOCAINE HYDROCHLORIDE 10 MG/ML
INJECTION, SOLUTION EPIDURAL; INFILTRATION; INTRACAUDAL; PERINEURAL
Status: DISPENSED
Start: 2023-05-16

## (undated) RX ORDER — LIDOCAINE HYDROCHLORIDE 20 MG/ML
INJECTION, SOLUTION EPIDURAL; INFILTRATION; INTRACAUDAL; PERINEURAL
Status: DISPENSED
Start: 2018-09-21